# Patient Record
Sex: MALE | Race: BLACK OR AFRICAN AMERICAN | NOT HISPANIC OR LATINO | Employment: FULL TIME | URBAN - METROPOLITAN AREA
[De-identification: names, ages, dates, MRNs, and addresses within clinical notes are randomized per-mention and may not be internally consistent; named-entity substitution may affect disease eponyms.]

---

## 2017-01-16 RX ORDER — TRIAMTERENE AND HYDROCHLOROTHIAZIDE 37.5; 25 MG/1; MG/1
TABLET ORAL
Qty: 30 TABLET | Refills: 0 | Status: SHIPPED | OUTPATIENT
Start: 2017-01-16 | End: 2017-02-28 | Stop reason: SDUPTHER

## 2017-02-21 RX ORDER — TRIAMTERENE AND HYDROCHLOROTHIAZIDE 37.5; 25 MG/1; MG/1
TABLET ORAL
Qty: 30 TABLET | Refills: 0 | OUTPATIENT
Start: 2017-02-21

## 2017-02-28 RX ORDER — TRIAMTERENE AND HYDROCHLOROTHIAZIDE 37.5; 25 MG/1; MG/1
TABLET ORAL
Qty: 30 TABLET | Refills: 4 | Status: SHIPPED | OUTPATIENT
Start: 2017-02-28 | End: 2017-03-02 | Stop reason: SDUPTHER

## 2017-03-02 ENCOUNTER — OFFICE VISIT (OUTPATIENT)
Dept: INTERNAL MEDICINE | Facility: CLINIC | Age: 42
End: 2017-03-02

## 2017-03-02 VITALS
OXYGEN SATURATION: 98 % | BODY MASS INDEX: 27.16 KG/M2 | HEIGHT: 71 IN | SYSTOLIC BLOOD PRESSURE: 136 MMHG | HEART RATE: 69 BPM | DIASTOLIC BLOOD PRESSURE: 92 MMHG | WEIGHT: 194 LBS

## 2017-03-02 DIAGNOSIS — I10 BENIGN ESSENTIAL HTN: Primary | ICD-10-CM

## 2017-03-02 PROCEDURE — 99213 OFFICE O/P EST LOW 20 MIN: CPT | Performed by: INTERNAL MEDICINE

## 2017-03-02 RX ORDER — TRIAMTERENE AND HYDROCHLOROTHIAZIDE 37.5; 25 MG/1; MG/1
1 CAPSULE ORAL EVERY MORNING
Qty: 90 CAPSULE | Refills: 1 | Status: SHIPPED | OUTPATIENT
Start: 2017-03-02 | End: 2017-08-03 | Stop reason: SDUPTHER

## 2017-03-02 RX ORDER — AMLODIPINE BESYLATE 10 MG/1
10 TABLET ORAL DAILY
Qty: 90 TABLET | Refills: 1 | Status: SHIPPED | OUTPATIENT
Start: 2017-03-02 | End: 2017-08-03 | Stop reason: SDUPTHER

## 2017-03-02 NOTE — PROGRESS NOTES
Subjective   Sadi Reyes is a 41 y.o. male.     Hypertension   This is a chronic problem. The current episode started more than 1 year ago. Associated symptoms include chest pain. Pertinent negatives include no palpitations.        The following portions of the patient's history were reviewed and updated as appropriate: allergies, current medications, past family history, past medical history, past social history, past surgical history and problem list.    Review of Systems   Constitutional:        Generally doing well     HENT: Negative.    Eyes: Negative.    Respiratory: Negative.    Cardiovascular: Positive for chest pain. Negative for palpitations.        About once or twice a week will have stomach cramps for 5 minutes after taking Dyazide Has been out for several days & hasn't bothered him Usuallt takes RX on an empty stomach when he 1st gets up @ 0500 Advised to try taking after he eats something Does check BP & has been OK   Gastrointestinal: Negative.    Genitourinary: Negative.    Musculoskeletal: Negative.    Neurological: Negative.        Objective   Physical Exam   Constitutional: He is oriented to person, place, and time. He appears well-developed.   HENT:   Head: Atraumatic.   Eyes: EOM are normal.   Neck: Neck supple.   Cardiovascular: Normal rate, regular rhythm and normal heart sounds.    Repeat 134/90   Pulmonary/Chest: Effort normal and breath sounds normal.   Musculoskeletal: Normal range of motion.   Neurological: He is alert and oriented to person, place, and time.   Vitals reviewed.      Assessment/Plan   Sadi was seen today for hypertension and med refill.    Diagnoses and all orders for this visit:    Benign essential HTN  -     amLODIPine (NORVASC) 10 MG tablet; Take 1 tablet by mouth Daily.  -     triamterene-hydrochlorothiazide (DYAZIDE) 37.5-25 MG per capsule; Take 1 capsule by mouth Every Morning.

## 2017-05-22 ENCOUNTER — OFFICE VISIT (OUTPATIENT)
Dept: INTERNAL MEDICINE | Facility: CLINIC | Age: 42
End: 2017-05-22

## 2017-05-22 VITALS
WEIGHT: 190 LBS | DIASTOLIC BLOOD PRESSURE: 94 MMHG | SYSTOLIC BLOOD PRESSURE: 150 MMHG | BODY MASS INDEX: 26.6 KG/M2 | HEIGHT: 71 IN

## 2017-05-22 DIAGNOSIS — R36.1 HEMATOSPERMIA: Primary | ICD-10-CM

## 2017-05-22 LAB
BACTERIA UR QL AUTO: ABNORMAL /HPF
BILIRUB UR QL STRIP: NEGATIVE
CLARITY UR: CLEAR
COLOR UR: YELLOW
GLUCOSE UR STRIP-MCNC: NEGATIVE MG/DL
HGB UR QL STRIP.AUTO: ABNORMAL
HYALINE CASTS UR QL AUTO: ABNORMAL /LPF
KETONES UR QL STRIP: NEGATIVE
LEUKOCYTE ESTERASE UR QL STRIP.AUTO: NEGATIVE
NITRITE UR QL STRIP: NEGATIVE
PH UR STRIP.AUTO: 5.5 [PH] (ref 5–8)
PROT UR QL STRIP: NEGATIVE
RBC # UR: ABNORMAL /HPF
REF LAB TEST METHOD: ABNORMAL
SP GR UR STRIP: 1.01 (ref 1–1.03)
SQUAMOUS #/AREA URNS HPF: ABNORMAL /HPF
UROBILINOGEN UR QL STRIP: ABNORMAL
WBC UR QL AUTO: ABNORMAL /HPF

## 2017-05-22 PROCEDURE — 81001 URINALYSIS AUTO W/SCOPE: CPT | Performed by: INTERNAL MEDICINE

## 2017-05-22 PROCEDURE — 99213 OFFICE O/P EST LOW 20 MIN: CPT | Performed by: INTERNAL MEDICINE

## 2017-05-22 RX ORDER — SULFAMETHOXAZOLE AND TRIMETHOPRIM 800; 160 MG/1; MG/1
1 TABLET ORAL 2 TIMES DAILY
Qty: 14 TABLET | Refills: 0 | Status: SHIPPED | OUTPATIENT
Start: 2017-05-22 | End: 2017-08-03

## 2017-07-28 RX ORDER — TRIAMTERENE AND HYDROCHLOROTHIAZIDE 37.5; 25 MG/1; MG/1
TABLET ORAL
Qty: 30 TABLET | Refills: 4 | Status: SHIPPED | OUTPATIENT
Start: 2017-07-28 | End: 2017-08-03 | Stop reason: SDUPTHER

## 2017-08-03 ENCOUNTER — OFFICE VISIT (OUTPATIENT)
Dept: INTERNAL MEDICINE | Facility: CLINIC | Age: 42
End: 2017-08-03

## 2017-08-03 VITALS
OXYGEN SATURATION: 97 % | BODY MASS INDEX: 26.88 KG/M2 | WEIGHT: 192 LBS | SYSTOLIC BLOOD PRESSURE: 140 MMHG | DIASTOLIC BLOOD PRESSURE: 94 MMHG | HEIGHT: 71 IN | HEART RATE: 67 BPM

## 2017-08-03 DIAGNOSIS — R05.8 COUGH WITH EXPECTORATION: Primary | ICD-10-CM

## 2017-08-03 DIAGNOSIS — I10 BENIGN ESSENTIAL HTN: ICD-10-CM

## 2017-08-03 DIAGNOSIS — S39.012A LUMBAR STRAIN, INITIAL ENCOUNTER: ICD-10-CM

## 2017-08-03 LAB
ALBUMIN SERPL-MCNC: 4.44 G/DL (ref 3.4–4.6)
ALBUMIN/GLOB SERPL: 1.3 G/DL
ALP SERPL-CCNC: 51 U/L (ref 46–116)
ALT SERPL W P-5'-P-CCNC: 58 U/L (ref 16–63)
ANION GAP SERPL CALCULATED.3IONS-SCNC: 10 MMOL/L
AST SERPL-CCNC: 28 U/L (ref 7–37)
BASOPHILS # BLD AUTO: 0.03 10*3/MM3 (ref 0–0.2)
BASOPHILS NFR BLD AUTO: 0.5 % (ref 0–1.5)
BILIRUB SERPL-MCNC: 0.3 MG/DL (ref 0.2–1)
BUN BLD-MCNC: 15 MG/DL (ref 6–22)
BUN/CREAT SERPL: 13.6 (ref 7–25)
CALCIUM SPEC-SCNC: 9.6 MG/DL (ref 8.6–10.5)
CHLORIDE SERPL-SCNC: 102 MMOL/L (ref 95–107)
CHOLEST SERPL-MCNC: 295 MG/DL (ref 0–200)
CO2 SERPL-SCNC: 28 MMOL/L (ref 23–32)
CREAT BLD-MCNC: 1.1 MG/DL (ref 0.7–1.3)
EOSINOPHIL # BLD AUTO: 0.12 10*3/MM3 (ref 0–0.7)
EOSINOPHIL # BLD AUTO: 1.9 % (ref 0.3–6.2)
ERYTHROCYTE [DISTWIDTH] IN BLOOD BY AUTOMATED COUNT: 14 % (ref 11.5–14.5)
GFR SERPL CREATININE-BSD FRML MDRD: 90 ML/MIN/1.73
GLOBULIN UR ELPH-MCNC: 3.5 GM/DL
GLUCOSE BLD-MCNC: 92 MG/DL (ref 70–100)
HCT VFR BLD AUTO: 47.4 % (ref 40.4–52.2)
HDLC SERPL-MCNC: 42 MG/DL (ref 40–81)
HGB BLD-MCNC: 15.9 G/DL (ref 13.7–17.6)
IMM GRANULOCYTES # BLD: 0 10*3/MM3 (ref 0–0.03)
IMM GRANULOCYTES NFR BLD: 0 % (ref 0–0.5)
LDLC SERPL CALC-MCNC: 233 MG/DL (ref 0–100)
LDLC/HDLC SERPL: 5.54 {RATIO}
LYMPHOCYTES # BLD AUTO: 2.6 10*3/MM3 (ref 0.9–4.8)
LYMPHOCYTES NFR BLD AUTO: 41.3 % (ref 19.6–45.3)
MCH RBC QN AUTO: 31.7 PG (ref 27–32.7)
MCHC RBC AUTO-ENTMCNC: 33.5 G/DL (ref 32.6–36.4)
MCV RBC AUTO: 94.4 FL (ref 79.8–96.2)
MONOCYTES # BLD AUTO: 0.45 10*3/MM3 (ref 0.2–1.2)
MONOCYTES NFR BLD AUTO: 7.2 % (ref 5–12)
NEUTROPHILS # BLD AUTO: 3.09 10*3/MM3 (ref 1.9–8.1)
NEUTROPHILS NFR BLD AUTO: 49.1 % (ref 42.7–76)
PLATELET # BLD AUTO: 233 10*3/MM3 (ref 140–500)
POTASSIUM BLD-SCNC: 4.3 MMOL/L (ref 3.3–5.3)
PROT SERPL-MCNC: 7.9 G/DL (ref 6.3–8.4)
RBC # BLD AUTO: 5.02 10*6/MM3 (ref 4.6–6)
SODIUM BLD-SCNC: 140 MMOL/L (ref 136–145)
TRIGL SERPL-MCNC: 101 MG/DL (ref 0–150)
VLDLC SERPL-MCNC: 20.2 MG/DL
WBC # BLD AUTO: 6.29 10*3/MM3 (ref 4.5–10.7)

## 2017-08-03 PROCEDURE — 80053 COMPREHEN METABOLIC PANEL: CPT | Performed by: INTERNAL MEDICINE

## 2017-08-03 PROCEDURE — 99213 OFFICE O/P EST LOW 20 MIN: CPT | Performed by: INTERNAL MEDICINE

## 2017-08-03 PROCEDURE — 80061 LIPID PANEL: CPT | Performed by: INTERNAL MEDICINE

## 2017-08-03 RX ORDER — TRIAMTERENE AND HYDROCHLOROTHIAZIDE 37.5; 25 MG/1; MG/1
1 TABLET ORAL DAILY
Qty: 90 TABLET | Refills: 1 | Status: SHIPPED | OUTPATIENT
Start: 2017-08-03 | End: 2018-05-05 | Stop reason: SDUPTHER

## 2017-08-03 RX ORDER — AMLODIPINE BESYLATE 10 MG/1
10 TABLET ORAL DAILY
Qty: 90 TABLET | Refills: 1 | Status: SHIPPED | OUTPATIENT
Start: 2017-08-03 | End: 2018-03-10 | Stop reason: SDUPTHER

## 2017-08-03 NOTE — PROGRESS NOTES
Subjective   Sadi Reyes is a 41 y.o. male.     Hypertension   This is a chronic problem. The current episode started more than 1 year ago. Pertinent negatives include no chest pain, palpitations or shortness of breath.        The following portions of the patient's history were reviewed and updated as appropriate: allergies, current medications, past medical history, past social history, past surgical history and problem list.    Review of Systems   Constitutional:        Generally doing well Has a couple new problems   HENT: Negative.    Eyes: Negative.    Respiratory: Positive for cough (Has been coughing up more mucous yellow / brown Worse in AM Nonsmoker). Negative for shortness of breath and wheezing.    Cardiovascular: Negative for chest pain and palpitations.        BP checks have been OK   Gastrointestinal: Negative.    Genitourinary: Negative.  Negative for hematuria.   Musculoskeletal: Positive for back pain ( Fell & hurt low back in January Pain seems to be getting wors Hurts when he sneezes or ties shoes).   Neurological: Negative.    Psychiatric/Behavioral: Negative.        Objective   Physical Exam   Constitutional: He is oriented to person, place, and time. He appears well-developed.   HENT:   Head: Normocephalic.   Eyes: EOM are normal.   Neck: Neck supple.   Cardiovascular: Normal rate, regular rhythm and normal heart sounds.    Repeat 134/90   Pulmonary/Chest: Effort normal and breath sounds normal.   Musculoskeletal:   No tenderness over lumbar spine or paravertebral muscles   Neurological: He is alert and oriented to person, place, and time.   Skin: Skin is warm and dry.   Vitals reviewed.      Assessment/Plan   Sadi was seen today for hypertension and follow-up.    Diagnoses and all orders for this visit:    Cough with expectoration  -     XR Chest 2 View    Benign essential HTN  -     amLODIPine (NORVASC) 10 MG tablet; Take 1 tablet by mouth Daily.  -     triamterene-hydrochlorothiazide  (MAXZIDE-25) 37.5-25 MG per tablet; Take 1 tablet by mouth Daily.  -     CBC Auto Differential; Future  -     Comprehensive Metabolic Panel; Future  -     Lipid Panel; Future    Lumbar strain, initial encounter  -     XR Spine Lumbar 4+ View    Given back stretching exercises

## 2017-08-21 ENCOUNTER — HOSPITAL ENCOUNTER (OUTPATIENT)
Dept: GENERAL RADIOLOGY | Facility: HOSPITAL | Age: 42
Discharge: HOME | End: 2017-08-21
Attending: INTERNAL MEDICINE

## 2017-08-21 ENCOUNTER — HOSPITAL ENCOUNTER (OUTPATIENT)
Dept: GENERAL RADIOLOGY | Facility: HOSPITAL | Age: 42
Discharge: HOME | End: 2017-08-21
Attending: INTERNAL MEDICINE | Admitting: INTERNAL MEDICINE

## 2017-08-21 PROCEDURE — 71020 HC CHEST PA AND LATERAL: CPT

## 2017-08-21 PROCEDURE — 72110 X-RAY EXAM L-2 SPINE 4/>VWS: CPT

## 2017-08-31 ENCOUNTER — TELEPHONE (OUTPATIENT)
Dept: INTERNAL MEDICINE | Facility: CLINIC | Age: 42
End: 2017-08-31

## 2017-11-17 ENCOUNTER — OFFICE VISIT (OUTPATIENT)
Dept: INTERNAL MEDICINE | Facility: CLINIC | Age: 42
End: 2017-11-17

## 2017-11-17 VITALS
HEART RATE: 64 BPM | SYSTOLIC BLOOD PRESSURE: 122 MMHG | OXYGEN SATURATION: 98 % | WEIGHT: 196 LBS | HEIGHT: 71 IN | BODY MASS INDEX: 27.44 KG/M2 | DIASTOLIC BLOOD PRESSURE: 84 MMHG

## 2017-11-17 DIAGNOSIS — E78.2 MIXED HYPERLIPIDEMIA: ICD-10-CM

## 2017-11-17 DIAGNOSIS — Z98.890 HX OF INGUINAL HERNIA REPAIR: ICD-10-CM

## 2017-11-17 DIAGNOSIS — I10 ESSENTIAL HYPERTENSION: Primary | ICD-10-CM

## 2017-11-17 DIAGNOSIS — Z87.19 HX OF INGUINAL HERNIA REPAIR: ICD-10-CM

## 2017-11-17 LAB
ALBUMIN SERPL-MCNC: 4.9 G/DL (ref 3.5–5.2)
ALBUMIN/GLOB SERPL: 1.9 G/DL
ALP SERPL-CCNC: 50 U/L (ref 39–117)
ALT SERPL-CCNC: 27 U/L (ref 1–41)
AST SERPL-CCNC: 19 U/L (ref 1–40)
BASOPHILS # BLD AUTO: 0.02 10*3/MM3 (ref 0–0.2)
BASOPHILS NFR BLD AUTO: 0.3 % (ref 0–2)
BILIRUB SERPL-MCNC: 0.3 MG/DL (ref 0.1–1.2)
BUN SERPL-MCNC: 19 MG/DL (ref 6–20)
BUN/CREAT SERPL: 11.7 (ref 7–25)
CALCIUM SERPL-MCNC: 10 MG/DL (ref 8.6–10.5)
CHLORIDE SERPL-SCNC: 101 MMOL/L (ref 98–107)
CO2 SERPL-SCNC: 31.1 MMOL/L (ref 22–29)
CREAT SERPL-MCNC: 1.63 MG/DL (ref 0.76–1.27)
DEPRECATED RDW RBC AUTO: 42.5 FL (ref 37–54)
EOSINOPHIL # BLD AUTO: 0.08 10*3/MM3 (ref 0–0.7)
EOSINOPHIL NFR BLD AUTO: 1.2 % (ref 0–5)
ERYTHROCYTE [DISTWIDTH] IN BLOOD BY AUTOMATED COUNT: 12.9 % (ref 11.5–15)
GLOBULIN SER CALC-MCNC: 2.6 GM/DL
GLUCOSE SERPL-MCNC: 90 MG/DL (ref 65–99)
HCT VFR BLD AUTO: 42.1 % (ref 40.1–51)
HGB BLD-MCNC: 14.3 G/DL (ref 13.7–17.5)
LYMPHOCYTES # BLD AUTO: 2.48 10*3/MM3 (ref 0.8–7)
LYMPHOCYTES NFR BLD AUTO: 35.8 % (ref 10–60)
MCH RBC QN AUTO: 31.3 PG (ref 26–34)
MCHC RBC AUTO-ENTMCNC: 34 G/DL (ref 31–37)
MCV RBC AUTO: 92.1 FL (ref 80–100)
MONOCYTES # BLD AUTO: 0.49 10*3/MM3 (ref 0–1)
MONOCYTES NFR BLD AUTO: 7.1 % (ref 0–13)
NEUTROPHILS # BLD AUTO: 3.85 10*3/MM3 (ref 1–11)
NEUTROPHILS NFR BLD AUTO: 55.6 % (ref 30–85)
PLATELET # BLD AUTO: 243 10*3/MM3 (ref 150–450)
PMV BLD AUTO: 11.1 FL (ref 6–12)
POTASSIUM SERPL-SCNC: 3.9 MMOL/L (ref 3.5–5.2)
PROT SERPL-MCNC: 7.5 G/DL (ref 6–8.5)
RBC # BLD AUTO: 4.57 10*6/MM3 (ref 4.63–6.08)
SODIUM SERPL-SCNC: 143 MMOL/L (ref 136–145)
WBC NRBC COR # BLD: 6.92 10*3/MM3 (ref 5–10)

## 2017-11-17 PROCEDURE — 99214 OFFICE O/P EST MOD 30 MIN: CPT | Performed by: INTERNAL MEDICINE

## 2017-11-17 PROCEDURE — 85025 COMPLETE CBC W/AUTO DIFF WBC: CPT | Performed by: INTERNAL MEDICINE

## 2017-11-17 RX ORDER — ATORVASTATIN CALCIUM 20 MG/1
20 TABLET, FILM COATED ORAL DAILY
Qty: 30 TABLET | Refills: 3 | Status: SHIPPED | OUTPATIENT
Start: 2017-11-17 | End: 2018-03-10 | Stop reason: SDUPTHER

## 2017-11-17 RX ORDER — ATORVASTATIN CALCIUM 20 MG/1
20 TABLET, FILM COATED ORAL DAILY
Qty: 30 TABLET | Refills: 3 | Status: SHIPPED | OUTPATIENT
Start: 2017-11-17 | End: 2017-11-17 | Stop reason: SDUPTHER

## 2017-11-17 NOTE — PROGRESS NOTES
Subjective   Sadi Reyes is a 42 y.o. male.     Hernia   Associated symptoms include abdominal pain (Having pain in Rt inguinal area Had hernia repair there about 2 years ago).   Hypertension          The following portions of the patient's history were reviewed and updated as appropriate: allergies, current medications, past family history, past medical history, past social history, past surgical history and problem list.    Review of Systems   Gastrointestinal: Positive for abdominal pain (Having pain in Rt inguinal area Had hernia repair there about 2 years ago).   Musculoskeletal: Positive for back pain (Back pain comes & goes but overall doing better).       Objective   Physical Exam   Constitutional: He is oriented to person, place, and time. He appears well-developed.   HENT:   Head: Normocephalic.   Eyes: EOM are normal.   Neck: Neck supple.   Cardiovascular: Normal rate, regular rhythm and normal heart sounds.    Repeat 140/90   Pulmonary/Chest: Effort normal and breath sounds normal.   Abdominal:   No evidence of hernia   Musculoskeletal: Normal range of motion.   Neurological: He is alert and oriented to person, place, and time.   Vitals reviewed.      Assessment/Plan   Sadi was seen today for hernia and hypertension.    Diagnoses and all orders for this visit:    Essential hypertension  -     CBC Auto Differential; Future  -     Comprehensive Metabolic Panel; Future  -     CBC Auto Differential  -     Comprehensive Metabolic Panel    Mixed hyperlipidemia  -     Discontinue: atorvastatin (LIPITOR) 20 MG tablet; Take 1 tablet by mouth Daily.  -     atorvastatin (LIPITOR) 20 MG tablet; Take 1 tablet by mouth Daily.

## 2017-11-28 ENCOUNTER — TELEPHONE (OUTPATIENT)
Dept: INTERNAL MEDICINE | Facility: CLINIC | Age: 42
End: 2017-11-28

## 2017-11-28 NOTE — TELEPHONE ENCOUNTER
Spoke with patient.  Advised to deep see the diclofenac and start omeprazole 40 mg daily increase fluid intake for the elevated serum creatinine.  This may be related to the diclofenac therapy as well.

## 2017-11-28 NOTE — TELEPHONE ENCOUNTER
"----- Message from Nasreen Diana sent at 11/28/2017  9:05 AM EST -----  Contact: Patient   Patient called complaining of chest and stomach pains x2 days, which he thinks is a side effect of diclofenac sodium 75 mg, prescribed by his foot doctor, which he takes once a day for \"foot inflammation.\"      Also he is inquiring about last lab results.  Please advise.      Patient:  [phone]    [facility]/pharmacy #[id number] - [place] [address] AT [facility] - [phone]  - [phone] FX  "

## 2018-03-10 DIAGNOSIS — E78.2 MIXED HYPERLIPIDEMIA: ICD-10-CM

## 2018-03-10 DIAGNOSIS — I10 BENIGN ESSENTIAL HTN: ICD-10-CM

## 2018-03-12 RX ORDER — ATORVASTATIN CALCIUM 20 MG/1
20 TABLET, FILM COATED ORAL DAILY
Qty: 30 TABLET | Refills: 3 | Status: SHIPPED | OUTPATIENT
Start: 2018-03-12 | End: 2018-04-16 | Stop reason: SDUPTHER

## 2018-03-12 RX ORDER — AMLODIPINE BESYLATE 10 MG/1
TABLET ORAL
Qty: 90 TABLET | Refills: 1 | Status: SHIPPED | OUTPATIENT
Start: 2018-03-12 | End: 2018-09-29 | Stop reason: SDUPTHER

## 2018-03-30 ENCOUNTER — OFFICE VISIT (OUTPATIENT)
Dept: INTERNAL MEDICINE | Facility: CLINIC | Age: 43
End: 2018-03-30

## 2018-03-30 VITALS
HEART RATE: 61 BPM | HEIGHT: 71 IN | WEIGHT: 189 LBS | SYSTOLIC BLOOD PRESSURE: 122 MMHG | OXYGEN SATURATION: 99 % | DIASTOLIC BLOOD PRESSURE: 100 MMHG | BODY MASS INDEX: 26.46 KG/M2

## 2018-03-30 DIAGNOSIS — I10 ESSENTIAL HYPERTENSION: Primary | ICD-10-CM

## 2018-03-30 DIAGNOSIS — E78.2 MIXED HYPERLIPIDEMIA: ICD-10-CM

## 2018-03-30 LAB
ALBUMIN SERPL-MCNC: 4.7 G/DL (ref 3.5–5.2)
ALBUMIN/GLOB SERPL: 1.7 G/DL
ALP SERPL-CCNC: 53 U/L (ref 39–117)
ALT SERPL-CCNC: 37 U/L (ref 1–41)
AST SERPL-CCNC: 25 U/L (ref 1–40)
BASOPHILS # BLD AUTO: 0.03 10*3/MM3 (ref 0–0.2)
BASOPHILS NFR BLD AUTO: 0.5 % (ref 0–1.5)
BILIRUB SERPL-MCNC: 0.4 MG/DL (ref 0.1–1.2)
BUN SERPL-MCNC: 14 MG/DL (ref 6–20)
BUN/CREAT SERPL: 13.1 (ref 7–25)
CALCIUM SERPL-MCNC: 9.4 MG/DL (ref 8.6–10.5)
CHLORIDE SERPL-SCNC: 102 MMOL/L (ref 98–107)
CHOLEST SERPL-MCNC: 170 MG/DL (ref 0–200)
CO2 SERPL-SCNC: 25.3 MMOL/L (ref 22–29)
CREAT SERPL-MCNC: 1.07 MG/DL (ref 0.76–1.27)
EOSINOPHIL # BLD AUTO: 0.11 10*3/MM3 (ref 0–0.7)
EOSINOPHIL # BLD AUTO: 1.8 % (ref 0.3–6.2)
ERYTHROCYTE [DISTWIDTH] IN BLOOD BY AUTOMATED COUNT: 13.2 % (ref 11.5–14.5)
GLOBULIN SER CALC-MCNC: 2.8 GM/DL
GLUCOSE SERPL-MCNC: 93 MG/DL (ref 65–99)
HCT VFR BLD AUTO: 45.2 % (ref 40.4–52.2)
HDLC SERPL-MCNC: 41 MG/DL (ref 40–60)
HGB BLD-MCNC: 15.3 G/DL (ref 13.7–17.6)
IMM GRANULOCYTES # BLD: 0 10*3/MM3 (ref 0–0.03)
IMM GRANULOCYTES NFR BLD: 0 % (ref 0–0.5)
LDLC SERPL CALC-MCNC: 116 MG/DL (ref 0–100)
LYMPHOCYTES # BLD AUTO: 2.87 10*3/MM3 (ref 0.9–4.8)
LYMPHOCYTES NFR BLD AUTO: 46.5 % (ref 19.6–45.3)
MCH RBC QN AUTO: 31.7 PG (ref 27–32.7)
MCHC RBC AUTO-ENTMCNC: 33.8 G/DL (ref 32.6–36.4)
MCV RBC AUTO: 93.8 FL (ref 79.8–96.2)
MONOCYTES # BLD AUTO: 0.54 10*3/MM3 (ref 0.2–1.2)
MONOCYTES NFR BLD AUTO: 8.8 % (ref 5–12)
NEUTROPHILS # BLD AUTO: 2.62 10*3/MM3 (ref 1.9–8.1)
NEUTROPHILS NFR BLD AUTO: 42.4 % (ref 42.7–76)
PLATELET # BLD AUTO: 250 10*3/MM3 (ref 140–500)
POTASSIUM SERPL-SCNC: 3.9 MMOL/L (ref 3.5–5.2)
PROT SERPL-MCNC: 7.5 G/DL (ref 6–8.5)
RBC # BLD AUTO: 4.82 10*6/MM3 (ref 4.6–6)
SODIUM SERPL-SCNC: 140 MMOL/L (ref 136–145)
TRIGL SERPL-MCNC: 64 MG/DL (ref 0–150)
VLDLC SERPL-MCNC: 12.8 MG/DL (ref 5–40)
WBC # BLD AUTO: 6.17 10*3/MM3 (ref 4.5–10.7)

## 2018-03-30 PROCEDURE — 99213 OFFICE O/P EST LOW 20 MIN: CPT | Performed by: INTERNAL MEDICINE

## 2018-03-30 NOTE — PROGRESS NOTES
Subjective   Sadi Reyes is a 42 y.o. male.     Hypertension   This is a chronic problem. The current episode started more than 1 year ago. Pertinent negatives include no chest pain or palpitations.        The following portions of the patient's history were reviewed and updated as appropriate: allergies, current medications, past family history, past medical history, past social history, past surgical history and problem list.    Review of Systems   Constitutional:        Generally dong well   HENT: Negative.    Eyes: Negative.    Respiratory: Negative.    Cardiovascular: Negative for chest pain and palpitations.        BP checks @ home have been good   Gastrointestinal: Negative.    Genitourinary: Negative.    Musculoskeletal: Positive for back pain (Has back issues which is stable).   Neurological: Negative.        Objective   Physical Exam   Constitutional: He is oriented to person, place, and time. He appears well-developed.   HENT:   Head: Normocephalic.   Eyes: EOM are normal.   Neck: Neck supple.   Cardiovascular: Normal rate, regular rhythm and normal heart sounds.    RepeaT 130/90   Pulmonary/Chest: Effort normal and breath sounds normal.   Musculoskeletal: Normal range of motion.   Neurological: He is alert and oriented to person, place, and time.   Vitals reviewed.        Assessment/Plan   Sadi was seen today for hyperlipidemia and hypertension.    Diagnoses and all orders for this visit:    Essential hypertension  -     CBC Auto Differential; Future  -     Comprehensive Metabolic Panel; Future    Mixed hyperlipidemia  -     Lipid Panel; Future

## 2018-04-16 DIAGNOSIS — E78.2 MIXED HYPERLIPIDEMIA: ICD-10-CM

## 2018-04-16 RX ORDER — ATORVASTATIN CALCIUM 20 MG/1
20 TABLET, FILM COATED ORAL DAILY
Qty: 90 TABLET | Refills: 3 | Status: SHIPPED | OUTPATIENT
Start: 2018-04-16 | End: 2019-08-01 | Stop reason: SDUPTHER

## 2018-05-05 DIAGNOSIS — I10 BENIGN ESSENTIAL HTN: ICD-10-CM

## 2018-05-07 RX ORDER — TRIAMTERENE AND HYDROCHLOROTHIAZIDE 37.5; 25 MG/1; MG/1
1 TABLET ORAL DAILY
Qty: 90 TABLET | Refills: 1 | Status: SHIPPED | OUTPATIENT
Start: 2018-05-07 | End: 2018-09-24 | Stop reason: SDUPTHER

## 2018-07-02 ENCOUNTER — OFFICE VISIT (OUTPATIENT)
Dept: INTERNAL MEDICINE | Facility: CLINIC | Age: 43
End: 2018-07-02

## 2018-07-02 VITALS
BODY MASS INDEX: 24.78 KG/M2 | HEIGHT: 71 IN | DIASTOLIC BLOOD PRESSURE: 86 MMHG | SYSTOLIC BLOOD PRESSURE: 120 MMHG | OXYGEN SATURATION: 99 % | WEIGHT: 177 LBS | HEART RATE: 67 BPM

## 2018-07-02 DIAGNOSIS — I10 ESSENTIAL HYPERTENSION: Primary | ICD-10-CM

## 2018-07-02 DIAGNOSIS — R63.4 WEIGHT LOSS: ICD-10-CM

## 2018-07-02 DIAGNOSIS — S39.012D STRAIN OF LUMBAR REGION, SUBSEQUENT ENCOUNTER: ICD-10-CM

## 2018-07-02 LAB
ALBUMIN SERPL-MCNC: 4.9 G/DL (ref 3.5–5.2)
ALBUMIN/GLOB SERPL: 1.6 G/DL
ALP SERPL-CCNC: 50 U/L (ref 39–117)
ALT SERPL W P-5'-P-CCNC: 35 U/L (ref 1–41)
ANION GAP SERPL CALCULATED.3IONS-SCNC: 12.5 MMOL/L
AST SERPL-CCNC: 21 U/L (ref 1–40)
BASOPHILS # BLD AUTO: 0.03 10*3/MM3 (ref 0–0.2)
BASOPHILS NFR BLD AUTO: 0.5 % (ref 0–2)
BILIRUB SERPL-MCNC: 0.6 MG/DL (ref 0.1–1.2)
BUN BLD-MCNC: 12 MG/DL (ref 6–20)
BUN/CREAT SERPL: 11.1 (ref 7–25)
CALCIUM SPEC-SCNC: 9.7 MG/DL (ref 8.6–10.5)
CHLORIDE SERPL-SCNC: 100 MMOL/L (ref 98–107)
CO2 SERPL-SCNC: 26.5 MMOL/L (ref 22–29)
CREAT BLD-MCNC: 1.08 MG/DL (ref 0.76–1.27)
DEPRECATED RDW RBC AUTO: 45.5 FL (ref 37–54)
EOSINOPHIL # BLD AUTO: 0.09 10*3/MM3 (ref 0–0.7)
EOSINOPHIL NFR BLD AUTO: 1.4 % (ref 0–5)
ERYTHROCYTE [DISTWIDTH] IN BLOOD BY AUTOMATED COUNT: 13.6 % (ref 11.5–15)
GFR SERPL CREATININE-BSD FRML MDRD: 92 ML/MIN/1.73
GLOBULIN UR ELPH-MCNC: 3.1 GM/DL
GLUCOSE BLD-MCNC: 88 MG/DL (ref 65–99)
HCT VFR BLD AUTO: 46.8 % (ref 40.1–51)
HGB BLD-MCNC: 15.7 G/DL (ref 13.7–17.5)
LYMPHOCYTES # BLD AUTO: 2.16 10*3/MM3 (ref 0.8–7)
LYMPHOCYTES NFR BLD AUTO: 34.3 % (ref 10–60)
MCH RBC QN AUTO: 31.5 PG (ref 26–34)
MCHC RBC AUTO-ENTMCNC: 33.5 G/DL (ref 31–37)
MCV RBC AUTO: 93.8 FL (ref 80–100)
MONOCYTES # BLD AUTO: 0.49 10*3/MM3 (ref 0–1)
MONOCYTES NFR BLD AUTO: 7.8 % (ref 0–13)
NEUTROPHILS # BLD AUTO: 3.53 10*3/MM3 (ref 1–11)
NEUTROPHILS NFR BLD AUTO: 56 % (ref 30–85)
PLATELET # BLD AUTO: 249 10*3/MM3 (ref 150–450)
PMV BLD AUTO: 10.6 FL (ref 6–12)
POTASSIUM BLD-SCNC: 4.1 MMOL/L (ref 3.5–5.2)
PROT SERPL-MCNC: 8 G/DL (ref 6–8.5)
RBC # BLD AUTO: 4.99 10*6/MM3 (ref 4.63–6.08)
SODIUM BLD-SCNC: 139 MMOL/L (ref 136–145)
T3FREE SERPL-MCNC: 3.46 PG/ML (ref 2–4.4)
T4 FREE SERPL-MCNC: 1.01 NG/DL (ref 0.93–1.7)
TSH SERPL DL<=0.05 MIU/L-ACNC: 0.77 MIU/ML (ref 0.27–4.2)
WBC NRBC COR # BLD: 6.3 10*3/MM3 (ref 5–10)

## 2018-07-02 PROCEDURE — 84443 ASSAY THYROID STIM HORMONE: CPT | Performed by: INTERNAL MEDICINE

## 2018-07-02 PROCEDURE — 80053 COMPREHEN METABOLIC PANEL: CPT | Performed by: INTERNAL MEDICINE

## 2018-07-02 PROCEDURE — 84481 FREE ASSAY (FT-3): CPT | Performed by: INTERNAL MEDICINE

## 2018-07-02 PROCEDURE — 84439 ASSAY OF FREE THYROXINE: CPT | Performed by: INTERNAL MEDICINE

## 2018-07-02 PROCEDURE — 36415 COLL VENOUS BLD VENIPUNCTURE: CPT | Performed by: INTERNAL MEDICINE

## 2018-07-02 PROCEDURE — 99213 OFFICE O/P EST LOW 20 MIN: CPT | Performed by: INTERNAL MEDICINE

## 2018-07-02 PROCEDURE — 85025 COMPLETE CBC W/AUTO DIFF WBC: CPT | Performed by: INTERNAL MEDICINE

## 2018-07-02 RX ORDER — MELOXICAM 15 MG/1
15 TABLET ORAL DAILY
Qty: 30 TABLET | Refills: 3 | Status: SHIPPED | OUTPATIENT
Start: 2018-07-02 | End: 2018-08-29 | Stop reason: SDUPTHER

## 2018-07-02 NOTE — PROGRESS NOTES
Subjective   Sadi Reyes is a 42 y.o. male.     Hypertension   This is a chronic problem. The current episode started more than 1 year ago.        The following portions of the patient's history were reviewed and updated as appropriate: allergies, current medications, past family history, past medical history, past social history, past surgical history and problem list.    Review of Systems   Constitutional: Positive for unexpected weight change (Has been losing weight No appetite  ).        Has been having issues   HENT: Negative.    Respiratory: Negative.    Cardiovascular: Negative.    Gastrointestinal:        No GI complaints other than no appetite   Genitourinary: Negative.    Musculoskeletal: Positive for back pain (Has been acting up Taking advil 2BID).   Neurological: Negative.        Objective   Physical Exam   Constitutional: He appears well-developed.   HENT:   Head: Normocephalic.   Eyes: EOM are normal.   Neck: Neck supple.   Cardiovascular: Normal rate, regular rhythm and normal heart sounds.    Repeat 150/90   Pulmonary/Chest: Effort normal and breath sounds normal.   Neurological:   DTRs 3+ bilat SLR Neg        Assessment/Plan   Sadi was seen today for back pain, hypertension and hyperlipidemia.    Diagnoses and all orders for this visit:    Essential hypertension    Strain of lumbar region, subsequent encounter

## 2018-08-29 DIAGNOSIS — S39.012D STRAIN OF LUMBAR REGION, SUBSEQUENT ENCOUNTER: ICD-10-CM

## 2018-08-29 RX ORDER — MELOXICAM 15 MG/1
15 TABLET ORAL DAILY
Qty: 30 TABLET | Refills: 3 | Status: SHIPPED | OUTPATIENT
Start: 2018-08-29 | End: 2019-08-01

## 2018-09-24 DIAGNOSIS — I10 BENIGN ESSENTIAL HTN: ICD-10-CM

## 2018-09-24 RX ORDER — TRIAMTERENE AND HYDROCHLOROTHIAZIDE 37.5; 25 MG/1; MG/1
1 TABLET ORAL DAILY
Qty: 90 TABLET | Refills: 0 | Status: SHIPPED | OUTPATIENT
Start: 2018-09-24 | End: 2018-12-28 | Stop reason: SDUPTHER

## 2018-09-29 DIAGNOSIS — I10 BENIGN ESSENTIAL HTN: ICD-10-CM

## 2018-10-01 RX ORDER — AMLODIPINE BESYLATE 10 MG/1
TABLET ORAL
Qty: 90 TABLET | Refills: 1 | Status: SHIPPED | OUTPATIENT
Start: 2018-10-01 | End: 2019-04-11 | Stop reason: SDUPTHER

## 2018-12-28 DIAGNOSIS — I10 BENIGN ESSENTIAL HTN: ICD-10-CM

## 2018-12-31 RX ORDER — TRIAMTERENE AND HYDROCHLOROTHIAZIDE 37.5; 25 MG/1; MG/1
1 TABLET ORAL DAILY
Qty: 90 TABLET | Refills: 0 | Status: SHIPPED | OUTPATIENT
Start: 2018-12-31 | End: 2019-01-17 | Stop reason: SDUPTHER

## 2019-01-17 DIAGNOSIS — I10 BENIGN ESSENTIAL HTN: ICD-10-CM

## 2019-01-18 RX ORDER — TRIAMTERENE AND HYDROCHLOROTHIAZIDE 37.5; 25 MG/1; MG/1
TABLET ORAL
Qty: 90 TABLET | Refills: 0 | Status: SHIPPED | OUTPATIENT
Start: 2019-01-18 | End: 2019-08-01 | Stop reason: SDUPTHER

## 2019-04-11 DIAGNOSIS — I10 BENIGN ESSENTIAL HTN: ICD-10-CM

## 2019-04-11 RX ORDER — AMLODIPINE BESYLATE 10 MG/1
10 TABLET ORAL DAILY
Qty: 90 TABLET | Refills: 1 | Status: SHIPPED | OUTPATIENT
Start: 2019-04-11 | End: 2019-08-01

## 2019-07-11 DIAGNOSIS — I10 BENIGN ESSENTIAL HTN: ICD-10-CM

## 2019-08-01 ENCOUNTER — OFFICE VISIT (OUTPATIENT)
Dept: INTERNAL MEDICINE | Facility: CLINIC | Age: 44
End: 2019-08-01

## 2019-08-01 VITALS
OXYGEN SATURATION: 100 % | WEIGHT: 179.6 LBS | BODY MASS INDEX: 25.15 KG/M2 | DIASTOLIC BLOOD PRESSURE: 82 MMHG | HEART RATE: 62 BPM | HEIGHT: 71 IN | SYSTOLIC BLOOD PRESSURE: 134 MMHG

## 2019-08-01 DIAGNOSIS — I10 ESSENTIAL HYPERTENSION: Primary | ICD-10-CM

## 2019-08-01 DIAGNOSIS — E78.2 MIXED HYPERLIPIDEMIA: ICD-10-CM

## 2019-08-01 LAB
ALBUMIN SERPL-MCNC: 4.8 G/DL (ref 3.5–5.2)
ALBUMIN/GLOB SERPL: 1.7 G/DL
ALP SERPL-CCNC: 52 U/L (ref 39–117)
ALT SERPL-CCNC: 22 U/L (ref 1–41)
AST SERPL-CCNC: 19 U/L (ref 1–40)
BILIRUB SERPL-MCNC: 0.5 MG/DL (ref 0.2–1.2)
BUN SERPL-MCNC: 9 MG/DL (ref 6–20)
BUN/CREAT SERPL: 7.8 (ref 7–25)
CALCIUM SERPL-MCNC: 9.5 MG/DL (ref 8.6–10.5)
CHLORIDE SERPL-SCNC: 101 MMOL/L (ref 98–107)
CO2 SERPL-SCNC: 27.9 MMOL/L (ref 22–29)
CREAT SERPL-MCNC: 1.16 MG/DL (ref 0.76–1.27)
DEPRECATED RDW RBC AUTO: 44 FL (ref 37–54)
ERYTHROCYTE [DISTWIDTH] IN BLOOD BY AUTOMATED COUNT: 13.3 % (ref 12.3–15.4)
GLOBULIN SER CALC-MCNC: 2.8 GM/DL
GLUCOSE SERPL-MCNC: 90 MG/DL (ref 65–99)
HCT VFR BLD AUTO: 46.6 % (ref 37.5–51)
HGB BLD-MCNC: 16 G/DL (ref 13–17.7)
MCH RBC QN AUTO: 31.5 PG (ref 26.6–33)
MCHC RBC AUTO-ENTMCNC: 34.3 G/DL (ref 31.5–35.7)
MCV RBC AUTO: 91.7 FL (ref 79–97)
PLATELET # BLD AUTO: 230 10*3/MM3 (ref 140–450)
PMV BLD AUTO: 10.5 FL (ref 6–12)
POTASSIUM SERPL-SCNC: 4.2 MMOL/L (ref 3.5–5.2)
PROT SERPL-MCNC: 7.6 G/DL (ref 6–8.5)
RBC # BLD AUTO: 5.08 10*6/MM3 (ref 4.14–5.8)
SODIUM SERPL-SCNC: 141 MMOL/L (ref 136–145)
WBC NRBC COR # BLD: 6.26 10*3/MM3 (ref 3.4–10.8)

## 2019-08-01 PROCEDURE — 99214 OFFICE O/P EST MOD 30 MIN: CPT | Performed by: NURSE PRACTITIONER

## 2019-08-01 PROCEDURE — 85027 COMPLETE CBC AUTOMATED: CPT | Performed by: NURSE PRACTITIONER

## 2019-08-01 RX ORDER — TRIAMTERENE AND HYDROCHLOROTHIAZIDE 37.5; 25 MG/1; MG/1
1 TABLET ORAL DAILY
Qty: 90 TABLET | Refills: 1 | Status: SHIPPED | OUTPATIENT
Start: 2019-08-01 | End: 2020-01-27

## 2019-08-01 RX ORDER — ATORVASTATIN CALCIUM 20 MG/1
20 TABLET, FILM COATED ORAL DAILY
Qty: 90 TABLET | Refills: 1 | Status: SHIPPED | OUTPATIENT
Start: 2019-08-01 | End: 2020-01-27

## 2019-08-01 RX ORDER — AMLODIPINE BESYLATE 5 MG/1
5 TABLET ORAL DAILY
Qty: 30 TABLET | Refills: 1 | Status: SHIPPED | OUTPATIENT
Start: 2019-08-01 | End: 2019-08-29 | Stop reason: SDUPTHER

## 2019-08-01 NOTE — PROGRESS NOTES
Yue Reyes is a 43 y.o. male.     He has been working on low salt diet. He is monitoring blood pressure three times a week 120-130/80's.       Hypertension   This is a chronic problem. The current episode started more than 1 year ago. The problem is controlled. Pertinent negatives include no anxiety, blurred vision, chest pain, headaches, orthopnea, palpitations, peripheral edema, PND or shortness of breath. Current antihypertension treatment includes calcium channel blockers and diuretics. The current treatment provides significant improvement.   Hyperlipidemia   This is a chronic problem. The current episode started more than 1 year ago. The problem is controlled. Recent lipid tests were reviewed and are normal. Associated symptoms include myalgias (intermittent in legs, he will monitor ). Pertinent negatives include no chest pain or shortness of breath. Current antihyperlipidemic treatment includes statins. The current treatment provides significant improvement of lipids.        The following portions of the patient's history were reviewed and updated as appropriate: allergies, current medications, past family history, past medical history, past social history, past surgical history and problem list.    Review of Systems   Constitutional: Negative for activity change, appetite change, fatigue and fever.        Overall doing well    Eyes: Negative for blurred vision and visual disturbance.   Respiratory: Negative for cough, shortness of breath and wheezing.    Cardiovascular: Negative for chest pain, palpitations, orthopnea, leg swelling and PND.   Musculoskeletal: Positive for myalgias (intermittent in legs, he will monitor ).   Neurological: Negative for dizziness and headaches.       Objective   Physical Exam   Constitutional: He is oriented to person, place, and time. He appears well-developed and well-nourished.   HENT:   Head: Normocephalic.   Nose: Nose normal.   Neck: Carotid bruit is not  present. No thyroid mass and no thyromegaly present.   Cardiovascular: Regular rhythm and normal heart sounds. Exam reveals no S3 and no S4.   No murmur heard.  Repeat bp left arm 128/85  No pedal edema    Pulmonary/Chest: Effort normal and breath sounds normal. He has no decreased breath sounds. He has no wheezes. He has no rhonchi. He has no rales.   Musculoskeletal: He exhibits no edema.   Neurological: He is alert and oriented to person, place, and time. Gait normal.   Skin: Skin is warm and dry.   Psychiatric: He has a normal mood and affect.       Assessment/Plan   Sadi was seen today for hypertension and hyperlipidemia.    Diagnoses and all orders for this visit:    Essential hypertension  -     amLODIPine (NORVASC) 5 MG tablet; Take 1 tablet by mouth Daily.  -     triamterene-hydrochlorothiazide (MAXZIDE-25) 37.5-25 MG per tablet; Take 1 tablet by mouth Daily.  -     Comprehensive Metabolic Panel; Future  -     CBC No Differential; Future  -     Comprehensive Metabolic Panel  -     CBC No Differential    Mixed hyperlipidemia  Comments:  not fasting today   Orders:  -     atorvastatin (LIPITOR) 20 MG tablet; Take 1 tablet by mouth Daily.      He has been monitoring blood pressure at home and readings 120-80. He would like to reduce Norvasc  To 5 mg. He will monitor and if readings greater than 140/90 he will resume 10 mg dosing, discussed low salt diet and exercise.     He will return for physical with fasting labs.

## 2019-08-29 DIAGNOSIS — I10 ESSENTIAL HYPERTENSION: ICD-10-CM

## 2019-08-29 RX ORDER — AMLODIPINE BESYLATE 5 MG/1
TABLET ORAL
Qty: 30 TABLET | Refills: 1 | Status: SHIPPED | OUTPATIENT
Start: 2019-08-29 | End: 2020-02-04

## 2019-10-13 DIAGNOSIS — I10 BENIGN ESSENTIAL HTN: ICD-10-CM

## 2019-10-14 RX ORDER — AMLODIPINE BESYLATE 10 MG/1
TABLET ORAL
Qty: 90 TABLET | Refills: 1 | Status: SHIPPED | OUTPATIENT
Start: 2019-10-14 | End: 2020-02-10

## 2020-01-27 DIAGNOSIS — E78.2 MIXED HYPERLIPIDEMIA: ICD-10-CM

## 2020-01-27 DIAGNOSIS — I10 ESSENTIAL HYPERTENSION: ICD-10-CM

## 2020-01-27 RX ORDER — ATORVASTATIN CALCIUM 20 MG/1
TABLET, FILM COATED ORAL
Qty: 90 TABLET | Refills: 1 | Status: SHIPPED | OUTPATIENT
Start: 2020-01-27 | End: 2020-07-29

## 2020-01-27 RX ORDER — TRIAMTERENE AND HYDROCHLOROTHIAZIDE 37.5; 25 MG/1; MG/1
TABLET ORAL
Qty: 90 TABLET | Refills: 1 | Status: SHIPPED | OUTPATIENT
Start: 2020-01-27 | End: 2020-07-29

## 2020-02-04 ENCOUNTER — OFFICE VISIT (OUTPATIENT)
Dept: INTERNAL MEDICINE | Facility: CLINIC | Age: 45
End: 2020-02-04

## 2020-02-04 VITALS
BODY MASS INDEX: 25.2 KG/M2 | WEIGHT: 180 LBS | DIASTOLIC BLOOD PRESSURE: 84 MMHG | OXYGEN SATURATION: 99 % | SYSTOLIC BLOOD PRESSURE: 124 MMHG | HEIGHT: 71 IN | HEART RATE: 76 BPM

## 2020-02-04 DIAGNOSIS — Z00.00 ANNUAL PHYSICAL EXAM: Primary | ICD-10-CM

## 2020-02-04 LAB
APPEARANCE UR: CLEAR
BILIRUB UR QL STRIP: NEGATIVE
COLOR UR: YELLOW
GLUCOSE UR QL: NEGATIVE
HGB UR QL STRIP: NEGATIVE
KETONES UR QL STRIP: ABNORMAL
LEUKOCYTE ESTERASE UR QL STRIP: NEGATIVE
NITRITE UR QL STRIP: NEGATIVE
PH UR STRIP: 5.5 [PH] (ref 5–8)
PROT UR QL STRIP: NEGATIVE
SP GR UR: 1.02 (ref 1–1.03)
UROBILINOGEN UR STRIP-MCNC: ABNORMAL MG/DL

## 2020-02-04 PROCEDURE — 93000 ELECTROCARDIOGRAM COMPLETE: CPT | Performed by: NURSE PRACTITIONER

## 2020-02-04 PROCEDURE — 99396 PREV VISIT EST AGE 40-64: CPT | Performed by: NURSE PRACTITIONER

## 2020-02-04 NOTE — PATIENT INSTRUCTIONS
Health Maintenance, Male  A healthy lifestyle and preventive care is important for your health and wellness. Ask your health care provider about what schedule of regular examinations is right for you.  What should I know about weight and diet?  Eat a Healthy Diet  · Eat plenty of vegetables, fruits, whole grains, low-fat dairy products, and lean protein.  · Do not eat a lot of foods high in solid fats, added sugars, or salt.    Maintain a Healthy Weight  Regular exercise can help you achieve or maintain a healthy weight. You should:  · Do at least 150 minutes of exercise each week. The exercise should increase your heart rate and make you sweat (moderate-intensity exercise).  · Do strength-training exercises at least twice a week.  Watch Your Levels of Cholesterol and Blood Lipids  · Have your blood tested for lipids and cholesterol every 5 years starting at 35 years of age. If you are at high risk for heart disease, you should start having your blood tested when you are 20 years old. You may need to have your cholesterol levels checked more often if:  ? Your lipid or cholesterol levels are high.  ? You are older than 50 years of age.  ? You are at high risk for heart disease.  What should I know about cancer screening?  Many types of cancers can be detected early and may often be prevented.  Lung Cancer  · You should be screened every year for lung cancer if:  ? You are a current smoker who has smoked for at least 30 years.  ? You are a former smoker who has quit within the past 15 years.  · Talk to your health care provider about your screening options, when you should start screening, and how often you should be screened.  Colorectal Cancer  · Routine colorectal cancer screening usually begins at 50 years of age and should be repeated every 5-10 years until you are 75 years old. You may need to be screened more often if early forms of precancerous polyps or small growths are found. Your health care provider may  recommend screening at an earlier age if you have risk factors for colon cancer.  · Your health care provider may recommend using home test kits to check for hidden blood in the stool.  · A small camera at the end of a tube can be used to examine your colon (sigmoidoscopy or colonoscopy). This checks for the earliest forms of colorectal cancer.  Prostate and Testicular Cancer  · Depending on your age and overall health, your health care provider may do certain tests to screen for prostate and testicular cancer.  · Talk to your health care provider about any symptoms or concerns you have about testicular or prostate cancer.  Skin Cancer  · Check your skin from head to toe regularly.  · Tell your health care provider about any new moles or changes in moles, especially if:  ? There is a change in a mole’s size, shape, or color.  ? You have a mole that is larger than a pencil eraser.  · Always use sunscreen. Apply sunscreen liberally and repeat throughout the day.  · Protect yourself by wearing long sleeves, pants, a wide-brimmed hat, and sunglasses when outside.  What should I know about heart disease, diabetes, and high blood pressure?  · If you are 18-39 years of age, have your blood pressure checked every 3-5 years. If you are 40 years of age or older, have your blood pressure checked every year. You should have your blood pressure measured twice--once when you are at a hospital or clinic, and once when you are not at a hospital or clinic. Record the average of the two measurements. To check your blood pressure when you are not at a hospital or clinic, you can use:  ? An automated blood pressure machine at a pharmacy.  ? A home blood pressure monitor.  · Talk to your health care provider about your target blood pressure.  · If you are between 45-79 years old, ask your health care provider if you should take aspirin to prevent heart disease.  · Have regular diabetes screenings by checking your fasting blood sugar  level.  ? If you are at a normal weight and have a low risk for diabetes, have this test once every three years after the age of 45.  ? If you are overweight and have a high risk for diabetes, consider being tested at a younger age or more often.  · A one-time screening for abdominal aortic aneurysm (AAA) by ultrasound is recommended for men aged 65-75 years who are current or former smokers.  What should I know about preventing infection?  Hepatitis B  If you have a higher risk for hepatitis B, you should be screened for this virus. Talk with your health care provider to find out if you are at risk for hepatitis B infection.  Hepatitis C  Blood testing is recommended for:  · Everyone born from 1945 through 1965.  · Anyone with known risk factors for hepatitis C.  Sexually Transmitted Diseases (STDs)  · You should be screened each year for STDs including gonorrhea and chlamydia if:  ? You are sexually active and are younger than 24 years of age.  ? You are older than 24 years of age and your health care provider tells you that you are at risk for this type of infection.  ? Your sexual activity has changed since you were last screened and you are at an increased risk for chlamydia or gonorrhea. Ask your health care provider if you are at risk.  · Talk with your health care provider about whether you are at high risk of being infected with HIV. Your health care provider may recommend a prescription medicine to help prevent HIV infection.  What else can I do?  · Schedule regular health, dental, and eye exams.  · Stay current with your vaccines (immunizations).  · Do not use any tobacco products, such as cigarettes, chewing tobacco, and e-cigarettes. If you need help quitting, ask your health care provider.  · Limit alcohol intake to no more than 2 drinks per day. One drink equals 12 ounces of beer, 5 ounces of wine, or 1½ ounces of hard liquor.  · Do not use street drugs.  · Do not share needles.  · Ask your health  care provider for help if you need support or information about quitting drugs.  · Tell your health care provider if you often feel depressed.  · Tell your health care provider if you have ever been abused or do not feel safe at home.  This information is not intended to replace advice given to you by your health care provider. Make sure you discuss any questions you have with your health care provider.  Document Released: 06/15/2009 Document Revised: 08/16/2017 Document Reviewed: 09/20/2016  [employer] Interactive Patient Education © 2019 [employer]

## 2020-02-04 NOTE — PROGRESS NOTES
Yue Reyes is a 44 y.o. male.     .Well Adult Physical   Patient here for a comprehensive physical exam.The patient reports no problems    Do you take any herbs or supplements that were not prescribed by a doctor? no   Are you taking calcium supplements? no   Are you taking aspirin daily? no      He works full time. He is monitoring his blood pressure routinely and readings less than 140/90. He is exercising 3-4 times a week. He eating fair diet (no pork, vegetables, etc.)He is due for dentist today.                The following portions of the patient's history were reviewed and updated as appropriate: allergies, current medications, past family history, past medical history, past social history, past surgical history and problem list.    Review of Systems   Constitutional: Negative for activity change, appetite change, chills, fatigue, fever and unexpected weight change.        Overall doing well    HENT: Negative for congestion, ear discharge, ear pain, hearing loss, mouth sores, nosebleeds, postnasal drip, rhinorrhea, sinus pressure, sneezing, sore throat, tinnitus and voice change.    Eyes: Negative for visual disturbance.   Respiratory: Negative for cough, chest tightness, shortness of breath and wheezing.    Cardiovascular: Negative for chest pain, palpitations and leg swelling.   Gastrointestinal: Negative for abdominal distention, abdominal pain, anal bleeding, blood in stool, constipation, diarrhea, nausea and vomiting.   Endocrine: Negative for cold intolerance, heat intolerance, polydipsia, polyphagia and polyuria.   Genitourinary: Positive for frequency (r/t diuretic ). Negative for difficulty urinating, discharge, hematuria, penile pain, penile swelling, scrotal swelling, testicular pain and urgency.   Musculoskeletal: Negative for arthralgias, back pain, gait problem, joint swelling, myalgias, neck pain and neck stiffness.   Skin: Negative for color change, pallor and rash.    Neurological: Negative for dizziness, tremors, seizures, syncope, speech difficulty, weakness, light-headedness, numbness and headaches.   Hematological: Negative for adenopathy. Does not bruise/bleed easily.   Psychiatric/Behavioral: Negative for confusion, decreased concentration, dysphoric mood, sleep disturbance and suicidal ideas. The patient is not nervous/anxious.        Objective   Physical Exam   Constitutional: He is oriented to person, place, and time. He appears well-developed and well-nourished. No distress.   HENT:   Head: Normocephalic and atraumatic.   Right Ear: Hearing, tympanic membrane, external ear and ear canal normal.   Left Ear: Hearing, tympanic membrane, external ear and ear canal normal.   Nose: Nose normal. Right sinus exhibits no maxillary sinus tenderness and no frontal sinus tenderness. Left sinus exhibits no maxillary sinus tenderness and no frontal sinus tenderness.   Mouth/Throat: Uvula is midline, oropharynx is clear and moist and mucous membranes are normal. No tonsillar exudate.   Eyes: Pupils are equal, round, and reactive to light. Conjunctivae, EOM and lids are normal. Right eye exhibits no discharge. Left eye exhibits no discharge. No scleral icterus.   Neck: Normal range of motion. Neck supple. No JVD present. Carotid bruit is not present. No tracheal deviation present. No thyroid mass and no thyromegaly present.   Cardiovascular: Normal rate, regular rhythm, normal heart sounds and intact distal pulses. Exam reveals no gallop and no friction rub.   No murmur heard.  Repeat bp left arm 124/78  No pedal edema    Pulmonary/Chest: Effort normal and breath sounds normal. No respiratory distress. He has no wheezes. He has no rales. He exhibits no tenderness.   Abdominal: Soft. Bowel sounds are normal. He exhibits no distension and no mass. There is no tenderness. There is no rebound and no guarding. No hernia. Hernia confirmed negative in the right inguinal area and confirmed  negative in the left inguinal area.   Genitourinary: Rectum normal, prostate normal, testes normal and penis normal. Rectal exam shows guaiac negative stool.   Musculoskeletal: Normal range of motion. He exhibits no edema.   (-)SLR    Lymphadenopathy:        Head (right side): No submental, no submandibular, no tonsillar, no preauricular, no posterior auricular and no occipital adenopathy present.        Head (left side): No submental, no submandibular, no tonsillar, no preauricular, no posterior auricular and no occipital adenopathy present.     He has no cervical adenopathy. No inguinal adenopathy noted on the right or left side.   Neurological: He is alert and oriented to person, place, and time. He has normal reflexes. No cranial nerve deficit. He exhibits normal muscle tone. Coordination normal.   Reflex Scores:       Patellar reflexes are 2+ on the right side and 2+ on the left side.  Skin: Skin is warm and dry. No rash noted. No erythema.   Multiple tattoos    Psychiatric: He has a normal mood and affect. His speech is normal and behavior is normal. Judgment and thought content normal. Cognition and memory are normal.   Vitals reviewed.      Assessment/Plan   Sadi was seen today for annual exam.    Diagnoses and all orders for this visit:    Annual physical exam  -     ECG 12 Lead  -     Comprehensive Metabolic Panel; Future  -     Lipid Panel; Future  -     Urinalysis With Microscopic If Indicated (No Culture) - Urine, Clean Catch; Future  -     CBC Auto Differential; Future  -     TSH Rfx On Abnormal To Free T4; Future      ECG 12 Lead  Date/Time: 2/4/2020 10:59 AM  Performed by: Christine Mullins APRN  Authorized by: Christine Mullins APRN   Comparison: not compared with previous ECG   Rhythm: sinus bradycardia  Rate: bradycardic  Conduction: conduction normal  ST Segments: ST segments normal  T Waves: T waves normal  QRS axis: normal    Clinical impression: normal ECG        He will continue with  exercise 3-4 times a week at least 30 minutes with healthy diet.   He was advised to have routine eye exam  He would like to reduce norvasc 10 to 5 mg. He will provide bp readings in 3 weeks and will discuss if ok to reduce dosing.

## 2020-02-05 LAB
ALBUMIN SERPL-MCNC: 5 G/DL (ref 3.5–5.2)
ALBUMIN/GLOB SERPL: 1.9 G/DL
ALP SERPL-CCNC: 54 U/L (ref 39–117)
ALT SERPL-CCNC: 26 U/L (ref 1–41)
AST SERPL-CCNC: 22 U/L (ref 1–40)
BASOPHILS # BLD AUTO: 0.03 10*3/MM3 (ref 0–0.2)
BASOPHILS NFR BLD AUTO: 0.5 % (ref 0–1.5)
BILIRUB SERPL-MCNC: 0.6 MG/DL (ref 0.2–1.2)
BUN SERPL-MCNC: 12 MG/DL (ref 6–20)
BUN/CREAT SERPL: 10 (ref 7–25)
CALCIUM SERPL-MCNC: 10 MG/DL (ref 8.6–10.5)
CHLORIDE SERPL-SCNC: 99 MMOL/L (ref 98–107)
CHOLEST SERPL-MCNC: 194 MG/DL (ref 0–200)
CO2 SERPL-SCNC: 26.7 MMOL/L (ref 22–29)
CREAT SERPL-MCNC: 1.2 MG/DL (ref 0.76–1.27)
EOSINOPHIL # BLD AUTO: 0.02 10*3/MM3 (ref 0–0.4)
EOSINOPHIL NFR BLD AUTO: 0.3 % (ref 0.3–6.2)
ERYTHROCYTE [DISTWIDTH] IN BLOOD BY AUTOMATED COUNT: 13.1 % (ref 12.3–15.4)
GLOBULIN SER CALC-MCNC: 2.7 GM/DL
GLUCOSE SERPL-MCNC: 85 MG/DL (ref 65–99)
HCT VFR BLD AUTO: 48.4 % (ref 37.5–51)
HDLC SERPL-MCNC: 52 MG/DL (ref 40–60)
HGB BLD-MCNC: 16.5 G/DL (ref 13–17.7)
IMM GRANULOCYTES # BLD AUTO: 0.01 10*3/MM3 (ref 0–0.05)
IMM GRANULOCYTES NFR BLD AUTO: 0.2 % (ref 0–0.5)
LDLC SERPL CALC-MCNC: 123 MG/DL (ref 0–100)
LYMPHOCYTES # BLD AUTO: 2.65 10*3/MM3 (ref 0.7–3.1)
LYMPHOCYTES NFR BLD AUTO: 40.2 % (ref 19.6–45.3)
MCH RBC QN AUTO: 31 PG (ref 26.6–33)
MCHC RBC AUTO-ENTMCNC: 34.1 G/DL (ref 31.5–35.7)
MCV RBC AUTO: 91 FL (ref 79–97)
MONOCYTES # BLD AUTO: 0.49 10*3/MM3 (ref 0.1–0.9)
MONOCYTES NFR BLD AUTO: 7.4 % (ref 5–12)
NEUTROPHILS # BLD AUTO: 3.4 10*3/MM3 (ref 1.7–7)
NEUTROPHILS NFR BLD AUTO: 51.4 % (ref 42.7–76)
NRBC BLD AUTO-RTO: 0 /100 WBC (ref 0–0.2)
PLATELET # BLD AUTO: 228 10*3/MM3 (ref 140–450)
POTASSIUM SERPL-SCNC: 4.5 MMOL/L (ref 3.5–5.2)
PROT SERPL-MCNC: 7.7 G/DL (ref 6–8.5)
RBC # BLD AUTO: 5.32 10*6/MM3 (ref 4.14–5.8)
SODIUM SERPL-SCNC: 138 MMOL/L (ref 136–145)
TRIGL SERPL-MCNC: 93 MG/DL (ref 0–150)
TSH SERPL DL<=0.005 MIU/L-ACNC: 0.62 UIU/ML (ref 0.27–4.2)
VLDLC SERPL CALC-MCNC: 18.6 MG/DL
WBC # BLD AUTO: 6.6 10*3/MM3 (ref 3.4–10.8)

## 2020-02-10 DIAGNOSIS — I10 BENIGN ESSENTIAL HTN: ICD-10-CM

## 2020-02-10 RX ORDER — AMLODIPINE BESYLATE 5 MG/1
5 TABLET ORAL DAILY
Qty: 30 TABLET | Refills: 1 | Status: SHIPPED | OUTPATIENT
Start: 2020-02-10 | End: 2020-02-17 | Stop reason: SDUPTHER

## 2020-02-17 DIAGNOSIS — I10 BENIGN ESSENTIAL HTN: ICD-10-CM

## 2020-02-17 RX ORDER — AMLODIPINE BESYLATE 5 MG/1
5 TABLET ORAL DAILY
Qty: 30 TABLET | Refills: 1 | Status: SHIPPED | OUTPATIENT
Start: 2020-02-17 | End: 2020-03-25

## 2020-03-25 DIAGNOSIS — I10 BENIGN ESSENTIAL HTN: ICD-10-CM

## 2020-03-25 RX ORDER — AMLODIPINE BESYLATE 5 MG/1
TABLET ORAL
Qty: 30 TABLET | Refills: 3 | Status: SHIPPED | OUTPATIENT
Start: 2020-03-25 | End: 2020-07-22

## 2020-03-26 ENCOUNTER — TELEPHONE (OUTPATIENT)
Dept: INTERNAL MEDICINE | Facility: CLINIC | Age: 45
End: 2020-03-26

## 2020-03-26 ENCOUNTER — HOSPITAL ENCOUNTER (EMERGENCY)
Facility: HOSPITAL | Age: 45
Discharge: HOME | End: 2020-03-26
Attending: EMERGENCY MEDICINE | Admitting: EMERGENCY MEDICINE

## 2020-03-26 ENCOUNTER — APPOINTMENT (OUTPATIENT)
Dept: CT IMAGING | Facility: HOSPITAL | Age: 45
End: 2020-03-26

## 2020-03-26 VITALS
BODY MASS INDEX: 25.2 KG/M2 | OXYGEN SATURATION: 97 % | HEIGHT: 71 IN | RESPIRATION RATE: 16 BRPM | WEIGHT: 180 LBS | HEART RATE: 59 BPM | DIASTOLIC BLOOD PRESSURE: 98 MMHG | SYSTOLIC BLOOD PRESSURE: 133 MMHG | TEMPERATURE: 98.2 F

## 2020-03-26 DIAGNOSIS — Z86.79 HISTORY OF HYPERTENSION: ICD-10-CM

## 2020-03-26 DIAGNOSIS — R51.9 NONINTRACTABLE EPISODIC HEADACHE, UNSPECIFIED HEADACHE TYPE: Primary | ICD-10-CM

## 2020-03-26 PROCEDURE — 99282 EMERGENCY DEPT VISIT SF MDM: CPT

## 2020-03-26 PROCEDURE — 70450 CT HEAD/BRAIN W/O DYE: CPT

## 2020-03-26 NOTE — TELEPHONE ENCOUNTER
Regarding: Complaint  Contact: [phone]  ----- Message from Roman Spears MA sent at 3/26/2020 10:28 AM EDT -----       ----- Message from Sadi Reyes to Christine Mullins APRN sent at 3/26/2020 10:27 AM -----   Messaging you before I go to emergency room.

## 2020-03-26 NOTE — TELEPHONE ENCOUNTER
----- Message from Roberta Catherine sent at 3/26/2020 11:28 AM EDT -----  Regarding: FW: Complaint  Contact: [phone]      ----- Message -----  From: Sadi Reyes  Sent: 3/26/2020  11:02 AM EDT  To: [facility]  Subject: Complaint                                        Sorry missed you call

## 2020-03-26 NOTE — TELEPHONE ENCOUNTER
I called patient to discuss her mychart message-headache. There was no answer so I left a message for patient to return call.

## 2020-03-26 NOTE — TELEPHONE ENCOUNTER
Spoke with patient he reports he was having sharp pain in his head. He is currently at ER at [facility]. I will review notes and he will follow up in office.

## 2020-07-22 DIAGNOSIS — I10 BENIGN ESSENTIAL HTN: ICD-10-CM

## 2020-07-22 RX ORDER — AMLODIPINE BESYLATE 5 MG/1
TABLET ORAL
Qty: 90 TABLET | Refills: 1 | Status: SHIPPED | OUTPATIENT
Start: 2020-07-22 | End: 2020-10-28

## 2020-07-27 ENCOUNTER — HOSPITAL ENCOUNTER (EMERGENCY)
Facility: HOSPITAL | Age: 45
Discharge: HOME | End: 2020-07-27
Attending: EMERGENCY MEDICINE | Admitting: EMERGENCY MEDICINE

## 2020-07-27 VITALS
HEART RATE: 85 BPM | SYSTOLIC BLOOD PRESSURE: 139 MMHG | OXYGEN SATURATION: 98 % | RESPIRATION RATE: 18 BRPM | TEMPERATURE: 97 F | DIASTOLIC BLOOD PRESSURE: 91 MMHG

## 2020-07-27 DIAGNOSIS — R51.9 NONINTRACTABLE EPISODIC HEADACHE, UNSPECIFIED HEADACHE TYPE: Primary | ICD-10-CM

## 2020-07-27 PROCEDURE — 99282 EMERGENCY DEPT VISIT SF MDM: CPT

## 2020-07-27 RX ORDER — TRAMADOL HYDROCHLORIDE 50 MG/1
50 TABLET ORAL EVERY 6 HOURS PRN
Qty: 20 TABLET | Refills: 0 | Status: SHIPPED | OUTPATIENT
Start: 2020-07-27 | End: 2020-12-11

## 2020-07-27 NOTE — ED TRIAGE NOTES
"Patient to er with c/o \" sharp pain all across his head.\" patient reported the pain started over the weekend and getting worse.\" patient has mask on in triage along with triage staff.   "

## 2020-07-27 NOTE — ED PROVIDER NOTES
EMERGENCY DEPARTMENT ENCOUNTER    Room Number:  35/35  Date of encounter:  7/27/2020  PCP: Christine Mullins APRN  Historian: Patient      HPI:  Chief Complaint: Headache  A complete HPI/ROS/PMH/PSH/SH/FH are unobtainable due to: N/A    Context: Sadi Reyes is a 44 y.o. male who presents to the ED c/o right-sided headache for a few days.  He has a history of intermittent headaches involving the left side of his head.  He has been reporting some vision changes associate with these headaches.  Today, the pain is mostly on the right side in the retro-orbital region.  He does have some mild ear pain, but reports no drainage or discharge.  His hearing is intact.  No recent fevers or chills.  No cough congestion or chest pain.      The patient was placed in a mask in triage, hand hygiene was performed before and after my interaction with the patient.  I wore a mask, safety glasses and gloves during my entire interaction with the patient.    PAST MEDICAL HISTORY  Active Ambulatory Problems     Diagnosis Date Noted   • No Active Ambulatory Problems     Resolved Ambulatory Problems     Diagnosis Date Noted   • No Resolved Ambulatory Problems     Past Medical History:   Diagnosis Date   • Carpal tunnel syndrome    • Hypertension    • Palpitations          PAST SURGICAL HISTORY  No past surgical history on file.      FAMILY HISTORY  Family History   Problem Relation Age of Onset   • Hypertension Mother    • No Known Problems Father    • No Known Problems Sister          SOCIAL HISTORY  Social History     Socioeconomic History   • Marital status:      Spouse name: Not on file   • Number of children: Not on file   • Years of education: Not on file   • Highest education level: Not on file   Tobacco Use   • Smoking status: Never Smoker   • Smokeless tobacco: Never Used   Substance and Sexual Activity   • Alcohol use: Yes   • Drug use: Yes     Frequency: 1.0 times per week     Types: Marijuana   • Sexual activity: Yes      Partners: Female         ALLERGIES  Patient has no known allergies.        REVIEW OF SYSTEMS  Review of Systems   Constitutional: Negative for activity change, appetite change and fever.   HENT: Positive for ear pain. Negative for congestion, ear discharge, facial swelling, hearing loss, sinus pain, sore throat and tinnitus.    Eyes: Negative.    Respiratory: Negative for cough and shortness of breath.    Cardiovascular: Negative for chest pain and leg swelling.   Gastrointestinal: Negative for abdominal pain, diarrhea and vomiting.   Endocrine: Negative.    Genitourinary: Negative for decreased urine volume and dysuria.   Musculoskeletal: Negative for neck pain.   Skin: Negative for rash and wound.   Allergic/Immunologic: Negative.    Neurological: Positive for headaches. Negative for weakness and numbness.   Hematological: Negative.    Psychiatric/Behavioral: Negative.    All other systems reviewed and are negative.       All systems reviewed and negative except for those discussed in HPI.       PHYSICAL EXAM    I have reviewed the triage vital signs and nursing notes.    ED Triage Vitals [07/27/20 0838]   Temp Heart Rate Resp BP SpO2   97 °F (36.1 °C) 85 -- -- 98 %      Temp src Heart Rate Source Patient Position BP Location FiO2 (%)   Tympanic -- -- -- --       Physical Exam   Constitutional: Pt. is oriented to person, place, and time and well-developed, well-nourished, and in no distress. No distress.   HENT: Normocephalic and atraumatic,  EOM are normal. Pupils are equal, round, and reactive to light. Oropharynx moist/nonerythematous.  TMs are normal bilaterally.  There is no mastoid tenderness.  Neck: Normal range of motion. Neck supple. No JVD present. No tracheal deviation present. No thyromegaly present.   Cardiovascular: Normal rate, regular rhythm and normal heart sounds. Exam reveals no gallop and no friction rub.   No murmur heard.  Pulmonary/Chest: Effort normal and breath sounds normal. No  stridor. No respiratory distress. No wheezes, no rales.   Abdominal: Soft. Bowel sounds are normal. No distension. There is no tenderness. There is no rebound and no guarding.   Musculoskeletal: Normal range of motion. No edema, tenderness or deformity.   Neurological: Pt. is alert and oriented to person, place, and time. Pt. has normal sensation and normal strength. No cranial nerve deficit. GCS score is 15.   Skin: Skin is warm and dry. No rash noted. Pt. is not diaphoretic. No erythema.   Psychiatric: Mood, affect and judgment normal.   Nursing note and vitals reviewed.        LAB RESULTS  No results found for this or any previous visit (from the past 24 hour(s)).    Ordered the above labs and independently reviewed the results.        RADIOLOGY  No Radiology Exams Resulted Within Past 24 Hours    I ordered the above noted radiological studies. Reviewed by me and discussed with radiologist.  See dictation for official radiology interpretation.      PROCEDURES    Procedures      MEDICATIONS GIVEN IN ER    Medications - No data to display      PROGRESS, DATA ANALYSIS, CONSULTS, AND MEDICAL DECISION MAKING    Any/all labs have been independently reviewed by me.  Any/all radiology studies have been reviewed by me and discussed with radiologist dictating the report.   EKG's independently viewed and interpreted by me.  Discussion below represents my analysis of pertinent findings related to patient's condition, differential diagnosis, treatment plan and final disposition.      ED Course as of Jul 27 1014   Mon Jul 27, 2020   1002 Prior record review: The patient was seen in March of this year for headaches, CT was unremarkable.    [WC]   1012 His headache does not sound suspicious for intracerebral hemorrhage.  He does have some migrainous components, I advised him to keep a headache diary for now.  We will try short course of tramadol.  He has a appointment with his primary care doctor next week.    [WC]      ED  Course User Index  [WC] Charles Larose MD       AS OF 10:14 VITALS:    BP -    HR - 85  TEMP - 97 °F (36.1 °C) (Tympanic)  02 SATS - 98%        DIAGNOSIS  Final diagnoses:   Nonintractable episodic headache, unspecified headache type         DISPOSITION  Discharged           Charles Larose MD  07/27/20 1014

## 2020-07-29 DIAGNOSIS — I10 ESSENTIAL HYPERTENSION: ICD-10-CM

## 2020-07-29 DIAGNOSIS — E78.2 MIXED HYPERLIPIDEMIA: ICD-10-CM

## 2020-07-29 RX ORDER — ATORVASTATIN CALCIUM 20 MG/1
TABLET, FILM COATED ORAL
Qty: 90 TABLET | Refills: 1 | Status: SHIPPED | OUTPATIENT
Start: 2020-07-29 | End: 2020-10-28

## 2020-07-29 RX ORDER — TRIAMTERENE AND HYDROCHLOROTHIAZIDE 37.5; 25 MG/1; MG/1
TABLET ORAL
Qty: 90 TABLET | Refills: 1 | Status: SHIPPED | OUTPATIENT
Start: 2020-07-29 | End: 2020-10-28

## 2020-08-04 ENCOUNTER — OFFICE VISIT (OUTPATIENT)
Dept: INTERNAL MEDICINE | Facility: CLINIC | Age: 45
End: 2020-08-04

## 2020-08-04 VITALS
HEART RATE: 60 BPM | WEIGHT: 183 LBS | BODY MASS INDEX: 25.62 KG/M2 | HEIGHT: 71 IN | SYSTOLIC BLOOD PRESSURE: 124 MMHG | OXYGEN SATURATION: 98 % | DIASTOLIC BLOOD PRESSURE: 82 MMHG

## 2020-08-04 DIAGNOSIS — I10 ESSENTIAL HYPERTENSION: Primary | ICD-10-CM

## 2020-08-04 DIAGNOSIS — R51.9 ACUTE NONINTRACTABLE HEADACHE, UNSPECIFIED HEADACHE TYPE: ICD-10-CM

## 2020-08-04 DIAGNOSIS — Z11.59 SCREENING FOR VIRAL DISEASE: ICD-10-CM

## 2020-08-04 DIAGNOSIS — E78.2 MIXED HYPERLIPIDEMIA: ICD-10-CM

## 2020-08-04 PROCEDURE — 99214 OFFICE O/P EST MOD 30 MIN: CPT | Performed by: NURSE PRACTITIONER

## 2020-08-04 NOTE — PROGRESS NOTES
Yue Reyes is a 44 y.o. male.     Overall doing ok. He did have to ER on 7/27/2020 due to right temporal headache that lasted for 3-4 days. There was no workup as his symptoms had resolved. He had eye exam last week and everything looked well. He is checking blood pressure and readings less than 140/90.  No increased stressors. He is exercising some (weight lifting) a couple days week, treadmill once a week. He is also very busy at work.     Hypertension   This is a chronic problem. The current episode started more than 1 year ago. The problem is unchanged. The problem is controlled. Associated symptoms include headaches (went to ER on 7/27/2020; and it resolved right side only ). Pertinent negatives include no anxiety, chest pain, palpitations or shortness of breath. Current antihypertension treatment includes calcium channel blockers and diuretics. The current treatment provides significant improvement.   Hyperlipidemia   This is a chronic problem. The current episode started more than 1 year ago. The problem is controlled. Recent lipid tests were reviewed and are normal. Pertinent negatives include no chest pain, leg pain, myalgias or shortness of breath. Current antihyperlipidemic treatment includes statins. The current treatment provides significant improvement of lipids.        The following portions of the patient's history were reviewed and updated as appropriate: allergies, current medications, past family history, past medical history, past social history, past surgical history and problem list.    Review of Systems   Constitutional: Negative for activity change, appetite change, fatigue and fever.   Respiratory: Negative for cough, shortness of breath and wheezing.    Cardiovascular: Negative for chest pain, palpitations and leg swelling.   Musculoskeletal: Negative for myalgias.   Neurological: Positive for headaches (went to ER on 7/27/2020; and it resolved right side only ). Negative  for dizziness and light-headedness.       Objective   Physical Exam   Constitutional: He is oriented to person, place, and time. He appears well-developed and well-nourished.   HENT:   Head: Normocephalic.   Nose: Nose normal.   Neck: Carotid bruit is not present. No thyroid mass and no thyromegaly present.   Cardiovascular: Regular rhythm and normal heart sounds. Exam reveals no S3 and no S4.   No murmur heard.  Repeat bp left arm 132/88  No pedal edema    Pulmonary/Chest: Effort normal and breath sounds normal. He has no decreased breath sounds. He has no wheezes. He has no rhonchi. He has no rales.   Musculoskeletal: He exhibits no edema.   Neurological: He is alert and oriented to person, place, and time. Gait normal.   Skin: Skin is warm and dry.   Psychiatric: He has a normal mood and affect.       Assessment/Plan   Sadi was seen today for hypertension.    Diagnoses and all orders for this visit:    Essential hypertension  -     Comprehensive Metabolic Panel  -     CBC & Differential  -     TSH Rfx On Abnormal To Free T4    Mixed hyperlipidemia  -     Lipid Panel    Screening for viral disease  -     Hepatitis C Antibody    Acute nonintractable headache, unspecified headache type  Comments:  resolved       He declines TDAP today.   He was advised to cardio exercise at least 150 minutes weekly and healthy diet with low salt.   HTN: well controlled  HLD: tolerating statin therapy; needs low fat/cholesterol diet  Headache: resolved, he will monitor if returns will consider further workup-he will submit [facility] paperwork per his job request (will not accept letter)- he had taken off on 7/27/2020 and today for appointment.

## 2020-08-05 LAB
ALBUMIN SERPL-MCNC: 4.9 G/DL (ref 3.5–5.2)
ALBUMIN/GLOB SERPL: 2.3 G/DL
ALP SERPL-CCNC: 46 U/L (ref 39–117)
ALT SERPL-CCNC: 32 U/L (ref 1–41)
AST SERPL-CCNC: 26 U/L (ref 1–40)
BASOPHILS # BLD AUTO: 0.04 10*3/MM3 (ref 0–0.2)
BASOPHILS NFR BLD AUTO: 0.6 % (ref 0–1.5)
BILIRUB SERPL-MCNC: 0.5 MG/DL (ref 0–1.2)
BUN SERPL-MCNC: 12 MG/DL (ref 6–20)
BUN/CREAT SERPL: 11.7 (ref 7–25)
CALCIUM SERPL-MCNC: 9.5 MG/DL (ref 8.6–10.5)
CHLORIDE SERPL-SCNC: 100 MMOL/L (ref 98–107)
CHOLEST SERPL-MCNC: 156 MG/DL (ref 0–200)
CO2 SERPL-SCNC: 28.1 MMOL/L (ref 22–29)
CREAT SERPL-MCNC: 1.03 MG/DL (ref 0.76–1.27)
EOSINOPHIL # BLD AUTO: 0.04 10*3/MM3 (ref 0–0.4)
EOSINOPHIL NFR BLD AUTO: 0.6 % (ref 0.3–6.2)
ERYTHROCYTE [DISTWIDTH] IN BLOOD BY AUTOMATED COUNT: 12.9 % (ref 12.3–15.4)
GLOBULIN SER CALC-MCNC: 2.1 GM/DL
GLUCOSE SERPL-MCNC: 89 MG/DL (ref 65–99)
HCT VFR BLD AUTO: 47.7 % (ref 37.5–51)
HCV AB S/CO SERPL IA: 0.1 S/CO RATIO (ref 0–0.9)
HDLC SERPL-MCNC: 45 MG/DL (ref 40–60)
HGB BLD-MCNC: 16.1 G/DL (ref 13–17.7)
IMM GRANULOCYTES # BLD AUTO: 0.01 10*3/MM3 (ref 0–0.05)
IMM GRANULOCYTES NFR BLD AUTO: 0.2 % (ref 0–0.5)
LDLC SERPL CALC-MCNC: 96 MG/DL (ref 0–100)
LYMPHOCYTES # BLD AUTO: 2.5 10*3/MM3 (ref 0.7–3.1)
LYMPHOCYTES NFR BLD AUTO: 39 % (ref 19.6–45.3)
MCH RBC QN AUTO: 31.4 PG (ref 26.6–33)
MCHC RBC AUTO-ENTMCNC: 33.8 G/DL (ref 31.5–35.7)
MCV RBC AUTO: 93 FL (ref 79–97)
MONOCYTES # BLD AUTO: 0.46 10*3/MM3 (ref 0.1–0.9)
MONOCYTES NFR BLD AUTO: 7.2 % (ref 5–12)
NEUTROPHILS # BLD AUTO: 3.36 10*3/MM3 (ref 1.7–7)
NEUTROPHILS NFR BLD AUTO: 52.4 % (ref 42.7–76)
NRBC BLD AUTO-RTO: 0 /100 WBC (ref 0–0.2)
PLATELET # BLD AUTO: 230 10*3/MM3 (ref 140–450)
POTASSIUM SERPL-SCNC: 4.2 MMOL/L (ref 3.5–5.2)
PROT SERPL-MCNC: 7 G/DL (ref 6–8.5)
RBC # BLD AUTO: 5.13 10*6/MM3 (ref 4.14–5.8)
SODIUM SERPL-SCNC: 138 MMOL/L (ref 136–145)
TRIGL SERPL-MCNC: 73 MG/DL (ref 0–150)
TSH SERPL DL<=0.005 MIU/L-ACNC: 0.78 UIU/ML (ref 0.27–4.2)
VLDLC SERPL CALC-MCNC: 14.6 MG/DL
WBC # BLD AUTO: 6.41 10*3/MM3 (ref 3.4–10.8)

## 2020-08-07 ENCOUNTER — PATIENT MESSAGE (OUTPATIENT)
Dept: INTERNAL MEDICINE | Facility: CLINIC | Age: 45
End: 2020-08-07

## 2020-08-07 NOTE — TELEPHONE ENCOUNTER
From: Sadi Reyes  To: Christine Mullins APRN  Sent: 8/7/2020 11:02 AM EDT  Subject: Non-Urgent Medical Question    Thank you

## 2020-08-14 ENCOUNTER — TELEPHONE (OUTPATIENT)
Dept: INTERNAL MEDICINE | Facility: CLINIC | Age: 45
End: 2020-08-14

## 2020-08-14 NOTE — TELEPHONE ENCOUNTER
I spoke to Mr. Reyes and informed him that his [facility] paperwork had been completed and faxed over to [employer].    He verbalized he understood and thank you.

## 2020-10-19 ENCOUNTER — TELEPHONE (OUTPATIENT)
Dept: NEUROLOGY | Facility: CLINIC | Age: 45
End: 2020-10-19

## 2020-10-19 ENCOUNTER — HOSPITAL ENCOUNTER (EMERGENCY)
Facility: HOSPITAL | Age: 45
Discharge: HOME | End: 2020-10-19
Attending: EMERGENCY MEDICINE | Admitting: EMERGENCY MEDICINE

## 2020-10-19 ENCOUNTER — TELEPHONE (OUTPATIENT)
Dept: INTERNAL MEDICINE | Facility: CLINIC | Age: 45
End: 2020-10-19

## 2020-10-19 VITALS
HEART RATE: 70 BPM | RESPIRATION RATE: 16 BRPM | TEMPERATURE: 97.8 F | DIASTOLIC BLOOD PRESSURE: 89 MMHG | HEIGHT: 71 IN | SYSTOLIC BLOOD PRESSURE: 142 MMHG | WEIGHT: 185 LBS | BODY MASS INDEX: 25.9 KG/M2 | OXYGEN SATURATION: 99 %

## 2020-10-19 DIAGNOSIS — G44.019 EPISODIC CLUSTER HEADACHE, NOT INTRACTABLE: Primary | ICD-10-CM

## 2020-10-19 PROCEDURE — 99282 EMERGENCY DEPT VISIT SF MDM: CPT

## 2020-10-19 NOTE — TELEPHONE ENCOUNTER
PT WAS SEEN IN [facility] ED ROOM TODAY 10- FOR Episodic cluster headache, not intractable. PER THE DISCHARGE SUMMARY PT IS TO   Schedule an appointment with [facility] NEUROLOGY (Neurology) in 2 days (10/21/2020); for further evaluation of your headaches. PLEASE ADVISE PROVIDER TO DETERMINE TIMEFRAME. PATIENT CAN BE REACHED [phone]

## 2020-10-19 NOTE — ED NOTES
Patient to er from home with c/o headache for the last two weeks. Patient reported some nausea and vomiting. Patient has mask on in triage along with staff.      Ulysses Leblanc RN  10/19/20 0927

## 2020-10-19 NOTE — ED NOTES
Nurse has on mask, glasses and face shield while in room with patient and patient wore his mask.      Viola Eagle RN  10/19/20 [id number]

## 2020-10-19 NOTE — TELEPHONE ENCOUNTER
PATIENT LEFT ER FOR HEAD PAIN , REFERRED TO NEUROLOGISTS BY ED DOCTOR. WANTED TO LET DR. REYES KNOW WHAT'S GOING ON AND IF SHE WANTS TO SEE HIM OR NOT.       PLEASE CALL PATENT [phone]

## 2020-10-19 NOTE — ED PROVIDER NOTES
EMERGENCY DEPARTMENT ENCOUNTER  Patient was placed in face mask in first look and the following protective measures were taken unless additional measures were taken and documented below in the ED course. Patient was wearing facemask when I entered the room and throughout our encounter. I wore full protective equipment throughout this patient encounter including a face mask, and gloves. Hand hygiene was performed before donning protective equipment and after removal when leaving the room.    Room Number:  40/40  Date of encounter:  10/19/2020  PCP: Christine Mullins APRN    HPI:  Context: Sadi Reyes is a 45 y.o. male who presents to the ED c/o chief complaint of headache.  Patient reports that for the last 2 weeks he has been having intermittent right-sided headaches.  Patient describes the headache as sharp, located behind his right eye, headache is brief but is recurring, occurs in clusters.  Patient has lacrimation as well as rhinorrhea on the affected side associate with the headaches.  Patient denies any aggravating or ameliorating factors.  Patient denies any confusion, difficulty with speech, word finding difficulty, weakness or numbness.  No ringing in his ears, no hearing loss, no difficulty with coordination, no difficulty with balance, no vertigo.  Patient denies any fever or systemic symptoms.  No rashes.  Patient denies any recent head trauma.  Patient reports he has been seen in the emergency department 3 times for this, previously had CT head imaging which was negative, has followed up with his primary care, and has been evaluated by an ophthalmologist to rule out any eye pathology.  Patient has not been seen by a neurologist.    MEDICAL HISTORY REVIEW  Reviewed in EPIC    PAST MEDICAL HISTORY  Active Ambulatory Problems     Diagnosis Date Noted   • No Active Ambulatory Problems     Resolved Ambulatory Problems     Diagnosis Date Noted   • No Resolved Ambulatory Problems     Past Medical History:    Diagnosis Date   • Carpal tunnel syndrome    • Hypertension    • Palpitations        PAST SURGICAL HISTORY  No past surgical history on file.    FAMILY HISTORY  Family History   Problem Relation Age of Onset   • Hypertension Mother    • No Known Problems Father    • No Known Problems Sister        SOCIAL HISTORY  Social History     Socioeconomic History   • Marital status:      Spouse name: Not on file   • Number of children: Not on file   • Years of education: Not on file   • Highest education level: Not on file   Tobacco Use   • Smoking status: Never Smoker   • Smokeless tobacco: Never Used   Substance and Sexual Activity   • Alcohol use: Yes   • Drug use: Yes     Frequency: 1.0 times per week     Types: Marijuana   • Sexual activity: Yes     Partners: Female       ALLERGIES  Patient has no known allergies.    The patient's allergies have been reviewed    REVIEW OF SYSTEMS  All systems reviewed and negative except for those discussed in HPI.     PHYSICAL EXAM  I have reviewed the triage vital signs and nursing notes.  ED Triage Vitals [10/19/20 0906]   Temp Heart Rate Resp BP SpO2   97.8 °F (36.6 °C) 70 -- -- 99 %      Temp src Heart Rate Source Patient Position BP Location FiO2 (%)   Tympanic -- -- -- --     General: No acute distress  HENT: NCAT, PERRL, Nares patent  Eyes: no scleral icterus  Neck: trachea midline, no ROM limitations  CV: regular rhythm, regular rate  Respiratory: normal effort  Abdomen: soft, nondistended, nontender to palpation, no rebound tenderness, no guarding or rigidity  : deferred  Musculoskeletal: no deformity  Neuro: Alert and oriented x3, extraocular motion intact, pupils are equal and round reactive to light, cranial nerves II through XII are grossly intact, normal speech, moves all extremities well, 5 out of 5 strength all 4 extremities, sensation intact light touch all 4 extremities, no ataxia.  Skin: warm, dry    LAB RESULTS  No results found for this or any previous  visit (from the past 24 hour(s)).    I ordered the above labs and reviewed the results.    RADIOLOGY  No Radiology Exams Resulted Within Past 24 Hours    I ordered the above noted radiological studies. I reviewed the images and results. I agree with the radiologist interpretation.    PROCEDURES  Procedures    MEDICATIONS GIVEN IN ER  Medications - No data to display    PROGRESS, DATA ANALYSIS, CONSULTS, AND MEDICAL DECISION MAKING  A complete history and physical exam have been performed.  All available laboratory and imaging results have been reviewed by myself prior to disposition.    MDM  After the initial H&P, I discussed pertinent information from history and physical exam with patient/family.  Discussed differential diagnosis.  Discussed plan for ED evaluation/work-up/treatment.  All questions answered.  Patient/family is agreeable with plan.  ED Course as of Oct 19 0923   Mon Oct 19, 2020   0911 Medical history reviewed and significant for: Patient was seen in the emergency department on 27 June for right-sided headache for several days.  Patient reports that he previously had intermittent left-sided headaches.  Patient had normal neuro exam.  Patient had previously been seen in March and had CT imaging at that time which was unremarkable.  Headache was thought likely to be migraine, recommended headache diary and short course of tramadol, follow-up with primary care doctor.  Patient was seen by his primary care physician on 4 August.  Headache had resolved and patient was asymptomatic at that time.  Counseling was given for chronic problems of hypertension hyperlipidemia.    [JG]   0920 She complains of intermittent headaches, most consistent with cluster headaches.  Patient has no alarm signs or symptoms, normal neuro exam, afebrile, vital signs stable.  Symptoms have been ongoing for prolonged period of time.  Given patient has had previous CT head imaging, no further work-up is felt necessary at this  time.  Discussed extensive discussion of return precautions as well as need for follow-up with primary care as well as neurologist.  Discussed possible triggers of cluster headaches and need to avoidance while pending neurology evaluation.  Patient is reassured by discussion, comfortable with plan for discharge with outpatient follow-up, no questions or concerns.    [JG]   0921 The patient was reexamined.  They have had symptomatic improvement during their ED stay.  I discussed today's findings with the patient, explaining the pertinent positives and negatives from today's visit, and the plan of care.  Discussed plan for discharge as there is no emergent indication for admission.  Discussed limitation of the ED work-up and that this is to rule out life-threatening emergencies but that they could require further testing as determined by their primary care and or any referred specialist patient is agreeable and understands need for follow-up and repeat exam/testing.  Patient is aware that discharge does not mean there is nothing wrong, indicates no emergency is present, and that they must continue their care with their primary care physician and/or any referred specialist.  They were given appropriate follow-up with their primary care physician and/or specialist.  I had an extensive discussion on the expected clinical course and return precautions.  Patient understands to return to the emergency department for continuation, worsening, or new symptoms.  I answered any of the patient's questions. Patient was discharged home in a stable condition.        [JG]      ED Course User Index  [JG] Daquan Chatman MD       AS OF 09:23 EDT VITALS:    BP -    HR - 70  TEMP - 97.8 °F (36.6 °C) (Tympanic)  O2 SATS - 99%    DIAGNOSIS  Final diagnoses:   Episodic cluster headache, not intractable         DISPOSITION  DISCHARGE    Patient discharged in stable condition.    Reviewed implications of results, diagnosis, meds,  responsibility to follow up, warning signs and symptoms of possible worsening, potential complications and reasons to return to ER.    Patient/Family voiced understanding of above instructions.    Discussed plan for discharge, as there is no emergent indication for admission. Patient referred to primary care provider for BP management due to today's BP. Pt/family is agreeable and understands need for follow up and repeat testing.  Pt is aware that discharge does not mean that nothing is wrong but it indicates no emergency is present that requires admission and they must continue care with follow-up as given below or physician of their choice.     FOLLOW-UP  Christine Mullins, APRN  [address] [facility] [address] [phone]    Schedule an appointment as soon as possible for a visit in 2 days  even if well    [facility] NEUROLOGY  [address] [address] [phone]  Schedule an appointment as soon as possible for a visit in 2 days  for further evaluation of your headaches         Medication List      No changes were made to your prescriptions during this visit.          Daquan Chatman MD  10/19/20 6670

## 2020-10-20 ENCOUNTER — TELEPHONE (OUTPATIENT)
Dept: INTERNAL MEDICINE | Facility: CLINIC | Age: 45
End: 2020-10-20

## 2020-10-20 NOTE — TELEPHONE ENCOUNTER
Advised patient that Christine will contact him in [facility] to his patient advise request when she is done seeing patients.  Ask the patient to not [facility] any more [facility] and he needs to schedule a appointment to address his concerns.

## 2020-10-20 NOTE — TELEPHONE ENCOUNTER
----- Message from Ana Ruggiero MA sent at 10/20/2020  8:12 AM EDT -----  Regarding: FW: Non-Urgent Medical Question  Contact: [phone]    ----- Message -----  From: Sadi Reyes  Sent: 10/19/2020   5:07 PM EDT  To: Keenan Trotter [facility]  Subject: Non-Urgent Medical Question                      Or can I stay out until after my neurologist appointment?

## 2020-10-22 ENCOUNTER — OFFICE VISIT (OUTPATIENT)
Dept: INTERNAL MEDICINE | Facility: CLINIC | Age: 45
End: 2020-10-22

## 2020-10-22 VITALS
DIASTOLIC BLOOD PRESSURE: 82 MMHG | SYSTOLIC BLOOD PRESSURE: 130 MMHG | RESPIRATION RATE: 14 BRPM | HEIGHT: 71 IN | BODY MASS INDEX: 25.76 KG/M2 | WEIGHT: 184 LBS

## 2020-10-22 DIAGNOSIS — R51.9 INTRACTABLE EPISODIC HEADACHE, UNSPECIFIED HEADACHE TYPE: Primary | ICD-10-CM

## 2020-10-22 PROCEDURE — 99213 OFFICE O/P EST LOW 20 MIN: CPT | Performed by: NURSE PRACTITIONER

## 2020-10-22 RX ORDER — PROPRANOLOL HCL 60 MG
60 CAPSULE, EXTENDED RELEASE 24HR ORAL DAILY
Qty: 30 CAPSULE | Refills: 1 | Status: SHIPPED | OUTPATIENT
Start: 2020-10-22 | End: 2020-11-02 | Stop reason: HOSPADM
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
## 2020-10-28 ENCOUNTER — APPOINTMENT (OUTPATIENT)
Dept: CT IMAGING | Facility: HOSPITAL | Age: 45
End: 2020-10-28

## 2020-10-28 ENCOUNTER — HOSPITAL ENCOUNTER (INPATIENT)
Facility: HOSPITAL | Age: 45
LOS: 5 days | Discharge: HOME | End: 2020-11-02
Attending: EMERGENCY MEDICINE | Admitting: HOSPITALIST

## 2020-10-28 ENCOUNTER — TELEPHONE (OUTPATIENT)
Dept: INTERNAL MEDICINE | Facility: CLINIC | Age: 45
End: 2020-10-28

## 2020-10-28 ENCOUNTER — PATIENT MESSAGE (OUTPATIENT)
Dept: INTERNAL MEDICINE | Facility: CLINIC | Age: 45
End: 2020-10-28

## 2020-10-28 ENCOUNTER — APPOINTMENT (OUTPATIENT)
Dept: GENERAL RADIOLOGY | Facility: HOSPITAL | Age: 45
End: 2020-10-28

## 2020-10-28 DIAGNOSIS — N00.9 ACUTE NEPHRITIS: Primary | ICD-10-CM

## 2020-10-28 DIAGNOSIS — N17.9 ACUTE RENAL FAILURE, UNSPECIFIED ACUTE RENAL FAILURE TYPE (HCC): ICD-10-CM

## 2020-10-28 DIAGNOSIS — R10.9 ACUTE ABDOMINAL PAIN: ICD-10-CM

## 2020-10-28 DIAGNOSIS — R19.7 NAUSEA VOMITING AND DIARRHEA: ICD-10-CM

## 2020-10-28 DIAGNOSIS — R11.2 NAUSEA VOMITING AND DIARRHEA: ICD-10-CM

## 2020-10-28 PROBLEM — N10 ACUTE PYELONEPHRITIS: Status: ACTIVE | Noted: 2020-10-28

## 2020-10-28 PROBLEM — E86.0 DEHYDRATION: Status: ACTIVE | Noted: 2020-10-28

## 2020-10-28 PROBLEM — D72.829 LEUCOCYTOSIS: Status: ACTIVE | Noted: 2020-10-28

## 2020-10-28 LAB
ALBUMIN SERPL-MCNC: 4.6 G/DL (ref 3.5–5.2)
ALBUMIN/GLOB SERPL: 1.8 G/DL
ALP SERPL-CCNC: 55 U/L (ref 39–117)
ALT SERPL W P-5'-P-CCNC: 40 U/L (ref 1–41)
ANION GAP SERPL CALCULATED.3IONS-SCNC: 9.7 MMOL/L (ref 5–15)
AST SERPL-CCNC: 27 U/L (ref 1–40)
B PARAPERT DNA SPEC QL NAA+PROBE: NOT DETECTED
B PERT DNA SPEC QL NAA+PROBE: NOT DETECTED
BACTERIA UR QL AUTO: NORMAL /HPF
BASOPHILS # BLD AUTO: 0.03 10*3/MM3 (ref 0–0.2)
BASOPHILS NFR BLD AUTO: 0.2 % (ref 0–1.5)
BILIRUB SERPL-MCNC: 0.5 MG/DL (ref 0–1.2)
BILIRUB UR QL STRIP: NEGATIVE
BUN SERPL-MCNC: 25 MG/DL (ref 6–20)
BUN/CREAT SERPL: 10.6 (ref 7–25)
C PNEUM DNA NPH QL NAA+NON-PROBE: NOT DETECTED
CALCIUM SPEC-SCNC: 9.5 MG/DL (ref 8.6–10.5)
CHLORIDE SERPL-SCNC: 100 MMOL/L (ref 98–107)
CK SERPL-CCNC: 175 U/L (ref 20–200)
CLARITY UR: CLEAR
CO2 SERPL-SCNC: 28.3 MMOL/L (ref 22–29)
COLOR UR: YELLOW
CREAT SERPL-MCNC: 2.36 MG/DL (ref 0.76–1.27)
D-LACTATE SERPL-SCNC: 1.3 MMOL/L (ref 0.5–2)
DEPRECATED RDW RBC AUTO: 46 FL (ref 37–54)
EOSINOPHIL # BLD AUTO: 0 10*3/MM3 (ref 0–0.4)
EOSINOPHIL NFR BLD AUTO: 0 % (ref 0.3–6.2)
ERYTHROCYTE [DISTWIDTH] IN BLOOD BY AUTOMATED COUNT: 13.2 % (ref 12.3–15.4)
FERRITIN SERPL-MCNC: 272 NG/ML (ref 30–400)
FLUAV SUBTYP SPEC NAA+PROBE: NOT DETECTED
FLUBV RNA ISLT QL NAA+PROBE: NOT DETECTED
GFR SERPL CREATININE-BSD FRML MDRD: 37 ML/MIN/1.73
GLOBULIN UR ELPH-MCNC: 2.6 GM/DL
GLUCOSE SERPL-MCNC: 122 MG/DL (ref 65–99)
GLUCOSE UR STRIP-MCNC: NEGATIVE MG/DL
HADV DNA SPEC NAA+PROBE: NOT DETECTED
HCOV 229E RNA SPEC QL NAA+PROBE: NOT DETECTED
HCOV HKU1 RNA SPEC QL NAA+PROBE: NOT DETECTED
HCOV NL63 RNA SPEC QL NAA+PROBE: NOT DETECTED
HCOV OC43 RNA SPEC QL NAA+PROBE: NOT DETECTED
HCT VFR BLD AUTO: 48.8 % (ref 37.5–51)
HGB BLD-MCNC: 16.4 G/DL (ref 13–17.7)
HGB UR QL STRIP.AUTO: ABNORMAL
HMPV RNA NPH QL NAA+NON-PROBE: NOT DETECTED
HOLD SPECIMEN: NORMAL
HOLD SPECIMEN: NORMAL
HPIV1 RNA SPEC QL NAA+PROBE: NOT DETECTED
HPIV2 RNA SPEC QL NAA+PROBE: NOT DETECTED
HPIV3 RNA NPH QL NAA+PROBE: NOT DETECTED
HPIV4 P GENE NPH QL NAA+PROBE: NOT DETECTED
HYALINE CASTS UR QL AUTO: NORMAL /LPF
IMM GRANULOCYTES # BLD AUTO: 0.03 10*3/MM3 (ref 0–0.05)
IMM GRANULOCYTES NFR BLD AUTO: 0.2 % (ref 0–0.5)
KETONES UR QL STRIP: NEGATIVE
LDH SERPL-CCNC: 195 U/L (ref 135–225)
LEUKOCYTE ESTERASE UR QL STRIP.AUTO: NEGATIVE
LIPASE SERPL-CCNC: 17 U/L (ref 13–60)
LYMPHOCYTES # BLD AUTO: 1.25 10*3/MM3 (ref 0.7–3.1)
LYMPHOCYTES NFR BLD AUTO: 9.6 % (ref 19.6–45.3)
M PNEUMO IGG SER IA-ACNC: NOT DETECTED
MCH RBC QN AUTO: 31.7 PG (ref 26.6–33)
MCHC RBC AUTO-ENTMCNC: 33.6 G/DL (ref 31.5–35.7)
MCV RBC AUTO: 94.4 FL (ref 79–97)
MONOCYTES # BLD AUTO: 0.74 10*3/MM3 (ref 0.1–0.9)
MONOCYTES NFR BLD AUTO: 5.7 % (ref 5–12)
NEUTROPHILS NFR BLD AUTO: 10.91 10*3/MM3 (ref 1.7–7)
NEUTROPHILS NFR BLD AUTO: 84.3 % (ref 42.7–76)
NITRITE UR QL STRIP: NEGATIVE
NRBC BLD AUTO-RTO: 0 /100 WBC (ref 0–0.2)
PH UR STRIP.AUTO: <=5 [PH] (ref 5–8)
PLATELET # BLD AUTO: 212 10*3/MM3 (ref 140–450)
PMV BLD AUTO: 9.9 FL (ref 6–12)
POTASSIUM SERPL-SCNC: 3.9 MMOL/L (ref 3.5–5.2)
PROCALCITONIN SERPL-MCNC: 0.61 NG/ML (ref 0–0.25)
PROT SERPL-MCNC: 7.2 G/DL (ref 6–8.5)
PROT UR QL STRIP: NEGATIVE
QT INTERVAL: 357 MS
RBC # BLD AUTO: 5.17 10*6/MM3 (ref 4.14–5.8)
RBC # UR: NORMAL /HPF
REF LAB TEST METHOD: NORMAL
RHINOVIRUS RNA SPEC NAA+PROBE: NOT DETECTED
RSV RNA NPH QL NAA+NON-PROBE: NOT DETECTED
SARS-COV-2 RNA NPH QL NAA+NON-PROBE: NOT DETECTED
SODIUM SERPL-SCNC: 138 MMOL/L (ref 136–145)
SP GR UR STRIP: 1.01 (ref 1–1.03)
SQUAMOUS #/AREA URNS HPF: NORMAL /HPF
TROPONIN T SERPL-MCNC: <0.01 NG/ML (ref 0–0.03)
UROBILINOGEN UR QL STRIP: ABNORMAL
WBC # BLD AUTO: 12.96 10*3/MM3 (ref 3.4–10.8)
WBC UR QL AUTO: NORMAL /HPF
WHOLE BLOOD HOLD SPECIMEN: NORMAL
WHOLE BLOOD HOLD SPECIMEN: NORMAL

## 2020-10-28 PROCEDURE — 83605 ASSAY OF LACTIC ACID: CPT | Performed by: EMERGENCY MEDICINE

## 2020-10-28 PROCEDURE — 82728 ASSAY OF FERRITIN: CPT | Performed by: EMERGENCY MEDICINE

## 2020-10-28 PROCEDURE — 99285 EMERGENCY DEPT VISIT HI MDM: CPT

## 2020-10-28 PROCEDURE — 36415 COLL VENOUS BLD VENIPUNCTURE: CPT

## 2020-10-28 PROCEDURE — 93005 ELECTROCARDIOGRAM TRACING: CPT | Performed by: EMERGENCY MEDICINE

## 2020-10-28 PROCEDURE — 71045 X-RAY EXAM CHEST 1 VIEW: CPT

## 2020-10-28 PROCEDURE — 81001 URINALYSIS AUTO W/SCOPE: CPT

## 2020-10-28 PROCEDURE — 85025 COMPLETE CBC W/AUTO DIFF WBC: CPT

## 2020-10-28 PROCEDURE — G0378 HOSPITAL OBSERVATION PER HR: HCPCS

## 2020-10-28 PROCEDURE — 74176 CT ABD & PELVIS W/O CONTRAST: CPT

## 2020-10-28 PROCEDURE — 0202U NFCT DS 22 TRGT SARS-COV-2: CPT | Performed by: EMERGENCY MEDICINE

## 2020-10-28 PROCEDURE — 25010000002 ONDANSETRON PER 1 MG: Performed by: EMERGENCY MEDICINE

## 2020-10-28 PROCEDURE — 83615 LACTATE (LD) (LDH) ENZYME: CPT | Performed by: EMERGENCY MEDICINE

## 2020-10-28 PROCEDURE — 83690 ASSAY OF LIPASE: CPT

## 2020-10-28 PROCEDURE — 25010000002 MORPHINE PER 10 MG: Performed by: EMERGENCY MEDICINE

## 2020-10-28 PROCEDURE — 87040 BLOOD CULTURE FOR BACTERIA: CPT | Performed by: INTERNAL MEDICINE

## 2020-10-28 PROCEDURE — 84145 PROCALCITONIN (PCT): CPT | Performed by: EMERGENCY MEDICINE

## 2020-10-28 PROCEDURE — 82550 ASSAY OF CK (CPK): CPT | Performed by: EMERGENCY MEDICINE

## 2020-10-28 PROCEDURE — 93010 ELECTROCARDIOGRAM REPORT: CPT | Performed by: INTERNAL MEDICINE

## 2020-10-28 PROCEDURE — 84484 ASSAY OF TROPONIN QUANT: CPT | Performed by: EMERGENCY MEDICINE

## 2020-10-28 PROCEDURE — 80053 COMPREHEN METABOLIC PANEL: CPT

## 2020-10-28 PROCEDURE — 87086 URINE CULTURE/COLONY COUNT: CPT | Performed by: INTERNAL MEDICINE

## 2020-10-28 PROCEDURE — 25010000002 CEFTRIAXONE PER 250 MG: Performed by: INTERNAL MEDICINE

## 2020-10-28 PROCEDURE — 93005 ELECTROCARDIOGRAM TRACING: CPT

## 2020-10-28 RX ORDER — AMLODIPINE BESYLATE 5 MG/1
5 TABLET ORAL DAILY
Status: DISCONTINUED | OUTPATIENT
Start: 2020-10-29 | End: 2020-10-31

## 2020-10-28 RX ORDER — SODIUM CHLORIDE 9 MG/ML
100 INJECTION, SOLUTION INTRAVENOUS CONTINUOUS
Status: DISCONTINUED | OUTPATIENT
Start: 2020-10-28 | End: 2020-10-29

## 2020-10-28 RX ORDER — CEFTRIAXONE SODIUM 1 G/50ML
1 INJECTION, SOLUTION INTRAVENOUS EVERY 24 HOURS
Status: COMPLETED | OUTPATIENT
Start: 2020-10-28 | End: 2020-11-01

## 2020-10-28 RX ORDER — MORPHINE SULFATE 2 MG/ML
4 INJECTION, SOLUTION INTRAMUSCULAR; INTRAVENOUS ONCE
Status: COMPLETED | OUTPATIENT
Start: 2020-10-28 | End: 2020-10-28

## 2020-10-28 RX ORDER — ACETAMINOPHEN 160 MG/5ML
650 SOLUTION ORAL EVERY 4 HOURS PRN
Status: DISCONTINUED | OUTPATIENT
Start: 2020-10-28 | End: 2020-11-02 | Stop reason: HOSPADM

## 2020-10-28 RX ORDER — ATORVASTATIN CALCIUM 20 MG/1
20 TABLET, FILM COATED ORAL DAILY
Status: DISCONTINUED | OUTPATIENT
Start: 2020-10-29 | End: 2020-11-02 | Stop reason: HOSPADM

## 2020-10-28 RX ORDER — SODIUM CHLORIDE 0.9 % (FLUSH) 0.9 %
10 SYRINGE (ML) INJECTION EVERY 12 HOURS SCHEDULED
Status: DISCONTINUED | OUTPATIENT
Start: 2020-10-28 | End: 2020-10-29

## 2020-10-28 RX ORDER — ATORVASTATIN CALCIUM 20 MG/1
20 TABLET, FILM COATED ORAL DAILY
COMMUNITY
End: 2021-03-15

## 2020-10-28 RX ORDER — ONDANSETRON 4 MG/1
4 TABLET, FILM COATED ORAL EVERY 6 HOURS PRN
Status: DISCONTINUED | OUTPATIENT
Start: 2020-10-28 | End: 2020-11-02 | Stop reason: HOSPADM

## 2020-10-28 RX ORDER — ACETAMINOPHEN 650 MG/1
650 SUPPOSITORY RECTAL EVERY 4 HOURS PRN
Status: DISCONTINUED | OUTPATIENT
Start: 2020-10-28 | End: 2020-11-02 | Stop reason: HOSPADM

## 2020-10-28 RX ORDER — AMLODIPINE BESYLATE 5 MG/1
5 TABLET ORAL DAILY
COMMUNITY
End: 2020-11-02 | Stop reason: HOSPADM

## 2020-10-28 RX ORDER — SODIUM CHLORIDE 0.9 % (FLUSH) 0.9 %
10 SYRINGE (ML) INJECTION AS NEEDED
Status: DISCONTINUED | OUTPATIENT
Start: 2020-10-28 | End: 2020-11-01

## 2020-10-28 RX ORDER — SODIUM CHLORIDE 9 MG/ML
125 INJECTION, SOLUTION INTRAVENOUS CONTINUOUS
Status: DISCONTINUED | OUTPATIENT
Start: 2020-10-28 | End: 2020-10-31

## 2020-10-28 RX ORDER — ACETAMINOPHEN 325 MG/1
650 TABLET ORAL EVERY 4 HOURS PRN
Status: DISCONTINUED | OUTPATIENT
Start: 2020-10-28 | End: 2020-11-02 | Stop reason: HOSPADM

## 2020-10-28 RX ORDER — ONDANSETRON 2 MG/ML
4 INJECTION INTRAMUSCULAR; INTRAVENOUS ONCE
Status: COMPLETED | OUTPATIENT
Start: 2020-10-28 | End: 2020-10-28

## 2020-10-28 RX ORDER — SODIUM CHLORIDE 0.9 % (FLUSH) 0.9 %
10 SYRINGE (ML) INJECTION AS NEEDED
Status: DISCONTINUED | OUTPATIENT
Start: 2020-10-28 | End: 2020-11-02 | Stop reason: HOSPADM

## 2020-10-28 RX ORDER — PROPRANOLOL HCL 60 MG
60 CAPSULE, EXTENDED RELEASE 24HR ORAL DAILY
Status: DISCONTINUED | OUTPATIENT
Start: 2020-10-29 | End: 2020-11-02

## 2020-10-28 RX ORDER — ONDANSETRON 2 MG/ML
4 INJECTION INTRAMUSCULAR; INTRAVENOUS EVERY 6 HOURS PRN
Status: DISCONTINUED | OUTPATIENT
Start: 2020-10-28 | End: 2020-11-02 | Stop reason: HOSPADM

## 2020-10-28 RX ORDER — TRIAMTERENE AND HYDROCHLOROTHIAZIDE 37.5; 25 MG/1; MG/1
1 TABLET ORAL DAILY
COMMUNITY
End: 2020-11-02 | Stop reason: HOSPADM

## 2020-10-28 RX ADMIN — CEFTRIAXONE SODIUM 1 G: 1 INJECTION, SOLUTION INTRAVENOUS at 22:34

## 2020-10-28 RX ADMIN — SODIUM CHLORIDE 100 ML/HR: 9 INJECTION, SOLUTION INTRAVENOUS at 22:35

## 2020-10-28 RX ADMIN — SODIUM CHLORIDE 1000 ML: 9 INJECTION, SOLUTION INTRAVENOUS at 18:10

## 2020-10-28 RX ADMIN — ONDANSETRON 4 MG: 2 INJECTION INTRAMUSCULAR; INTRAVENOUS at 16:17

## 2020-10-28 RX ADMIN — MORPHINE SULFATE 4 MG: 2 INJECTION, SOLUTION INTRAMUSCULAR; INTRAVENOUS at 16:17

## 2020-10-28 RX ADMIN — MORPHINE SULFATE 4 MG: 2 INJECTION, SOLUTION INTRAMUSCULAR; INTRAVENOUS at 19:45

## 2020-10-28 RX ADMIN — SODIUM CHLORIDE 125 ML/HR: 9 INJECTION, SOLUTION INTRAVENOUS at 18:10

## 2020-10-28 NOTE — TELEPHONE ENCOUNTER
From: Sadi Reyes  To: CHARI [place]  Sent: 10/28/2020 1:05 PM EDT  Subject: Complaint    I think I need a COVID test, I'm feeling like I'm having symptoms
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
## 2020-10-29 ENCOUNTER — APPOINTMENT (OUTPATIENT)
Dept: ULTRASOUND IMAGING | Facility: HOSPITAL | Age: 45
End: 2020-10-29

## 2020-10-29 LAB
ALBUMIN SERPL-MCNC: 3.8 G/DL (ref 3.5–5.2)
ALBUMIN/GLOB SERPL: 1.6 G/DL
ALP SERPL-CCNC: 44 U/L (ref 39–117)
ALT SERPL W P-5'-P-CCNC: 25 U/L (ref 1–41)
ANION GAP SERPL CALCULATED.3IONS-SCNC: 12 MMOL/L (ref 5–15)
AST SERPL-CCNC: 20 U/L (ref 1–40)
BASOPHILS # BLD AUTO: 0.03 10*3/MM3 (ref 0–0.2)
BASOPHILS NFR BLD AUTO: 0.3 % (ref 0–1.5)
BILIRUB SERPL-MCNC: 0.3 MG/DL (ref 0–1.2)
BUN SERPL-MCNC: 21 MG/DL (ref 6–20)
BUN/CREAT SERPL: 9.6 (ref 7–25)
CALCIUM SPEC-SCNC: 8.7 MG/DL (ref 8.6–10.5)
CHLORIDE SERPL-SCNC: 103 MMOL/L (ref 98–107)
CO2 SERPL-SCNC: 25 MMOL/L (ref 22–29)
CREAT SERPL-MCNC: 2.19 MG/DL (ref 0.76–1.27)
DEPRECATED RDW RBC AUTO: 43.1 FL (ref 37–54)
EOSINOPHIL # BLD AUTO: 0.01 10*3/MM3 (ref 0–0.4)
EOSINOPHIL NFR BLD AUTO: 0.1 % (ref 0.3–6.2)
ERYTHROCYTE [DISTWIDTH] IN BLOOD BY AUTOMATED COUNT: 13 % (ref 12.3–15.4)
GFR SERPL CREATININE-BSD FRML MDRD: 40 ML/MIN/1.73
GLOBULIN UR ELPH-MCNC: 2.4 GM/DL
GLUCOSE SERPL-MCNC: 94 MG/DL (ref 65–99)
HCT VFR BLD AUTO: 42.8 % (ref 37.5–51)
HGB BLD-MCNC: 14.8 G/DL (ref 13–17.7)
IMM GRANULOCYTES # BLD AUTO: 0.03 10*3/MM3 (ref 0–0.05)
IMM GRANULOCYTES NFR BLD AUTO: 0.3 % (ref 0–0.5)
LYMPHOCYTES # BLD AUTO: 1.74 10*3/MM3 (ref 0.7–3.1)
LYMPHOCYTES NFR BLD AUTO: 16.6 % (ref 19.6–45.3)
MCH RBC QN AUTO: 31.3 PG (ref 26.6–33)
MCHC RBC AUTO-ENTMCNC: 34.6 G/DL (ref 31.5–35.7)
MCV RBC AUTO: 90.5 FL (ref 79–97)
MONOCYTES # BLD AUTO: 0.95 10*3/MM3 (ref 0.1–0.9)
MONOCYTES NFR BLD AUTO: 9 % (ref 5–12)
NEUTROPHILS NFR BLD AUTO: 7.74 10*3/MM3 (ref 1.7–7)
NEUTROPHILS NFR BLD AUTO: 73.7 % (ref 42.7–76)
NRBC BLD AUTO-RTO: 0 /100 WBC (ref 0–0.2)
PLATELET # BLD AUTO: 196 10*3/MM3 (ref 140–450)
PMV BLD AUTO: 10.1 FL (ref 6–12)
POTASSIUM SERPL-SCNC: 3.6 MMOL/L (ref 3.5–5.2)
PROT SERPL-MCNC: 6.2 G/DL (ref 6–8.5)
RBC # BLD AUTO: 4.73 10*6/MM3 (ref 4.14–5.8)
SODIUM SERPL-SCNC: 140 MMOL/L (ref 136–145)
WBC # BLD AUTO: 10.5 10*3/MM3 (ref 3.4–10.8)

## 2020-10-29 PROCEDURE — 85025 COMPLETE CBC W/AUTO DIFF WBC: CPT | Performed by: INTERNAL MEDICINE

## 2020-10-29 PROCEDURE — 86160 COMPLEMENT ANTIGEN: CPT | Performed by: INTERNAL MEDICINE

## 2020-10-29 PROCEDURE — 80053 COMPREHEN METABOLIC PANEL: CPT | Performed by: INTERNAL MEDICINE

## 2020-10-29 PROCEDURE — 25010000002 CEFTRIAXONE PER 250 MG: Performed by: INTERNAL MEDICINE

## 2020-10-29 PROCEDURE — 86038 ANTINUCLEAR ANTIBODIES: CPT | Performed by: INTERNAL MEDICINE

## 2020-10-29 PROCEDURE — G0378 HOSPITAL OBSERVATION PER HR: HCPCS

## 2020-10-29 PROCEDURE — 76775 US EXAM ABDO BACK WALL LIM: CPT

## 2020-10-29 PROCEDURE — 86431 RHEUMATOID FACTOR QUANT: CPT | Performed by: INTERNAL MEDICINE

## 2020-10-29 PROCEDURE — 36415 COLL VENOUS BLD VENIPUNCTURE: CPT | Performed by: INTERNAL MEDICINE

## 2020-10-29 RX ORDER — HYDROCODONE BITARTRATE AND ACETAMINOPHEN 5; 325 MG/1; MG/1
1 TABLET ORAL EVERY 6 HOURS PRN
Status: DISCONTINUED | OUTPATIENT
Start: 2020-10-29 | End: 2020-11-02 | Stop reason: HOSPADM

## 2020-10-29 RX ORDER — PANTOPRAZOLE SODIUM 40 MG/1
40 TABLET, DELAYED RELEASE ORAL
Status: DISCONTINUED | OUTPATIENT
Start: 2020-10-29 | End: 2020-11-02 | Stop reason: HOSPADM

## 2020-10-29 RX ADMIN — HYDROCODONE BITARTRATE AND ACETAMINOPHEN 1 TABLET: 5; 325 TABLET ORAL at 09:24

## 2020-10-29 RX ADMIN — PANTOPRAZOLE SODIUM 40 MG: 40 TABLET, DELAYED RELEASE ORAL at 17:41

## 2020-10-29 RX ADMIN — HYDROCODONE BITARTRATE AND ACETAMINOPHEN 1 TABLET: 5; 325 TABLET ORAL at 01:05

## 2020-10-29 RX ADMIN — SODIUM CHLORIDE 125 ML/HR: 9 INJECTION, SOLUTION INTRAVENOUS at 16:58

## 2020-10-29 RX ADMIN — AMLODIPINE BESYLATE 5 MG: 5 TABLET ORAL at 09:24

## 2020-10-29 RX ADMIN — CEFTRIAXONE SODIUM 1 G: 1 INJECTION, SOLUTION INTRAVENOUS at 20:30

## 2020-10-30 ENCOUNTER — APPOINTMENT (OUTPATIENT)
Dept: CARDIOLOGY | Facility: HOSPITAL | Age: 45
End: 2020-10-30

## 2020-10-30 ENCOUNTER — APPOINTMENT (OUTPATIENT)
Dept: GENERAL RADIOLOGY | Facility: HOSPITAL | Age: 45
End: 2020-10-30

## 2020-10-30 ENCOUNTER — APPOINTMENT (OUTPATIENT)
Dept: CT IMAGING | Facility: HOSPITAL | Age: 45
End: 2020-10-30

## 2020-10-30 LAB
ANA SER QL: NEGATIVE
ANION GAP SERPL CALCULATED.3IONS-SCNC: 9.9 MMOL/L (ref 5–15)
AORTIC ARCH: 2.6 CM
AORTIC DIMENSIONLESS INDEX: 0.7 (DI)
ASCENDING AORTA: 2.9 CM
BACTERIA SPEC AEROBE CULT: NO GROWTH
BH CV ECHO MEAS - ACS: 2.1 CM
BH CV ECHO MEAS - AO ARCH DIAM (PROXIMAL TRANS.): 2.7 CM
BH CV ECHO MEAS - AO MAX PG (FULL): 1.8 MMHG
BH CV ECHO MEAS - AO MAX PG: 6.3 MMHG
BH CV ECHO MEAS - AO MEAN PG (FULL): 2 MMHG
BH CV ECHO MEAS - AO MEAN PG: 4 MMHG
BH CV ECHO MEAS - AO ROOT AREA (BSA CORRECTED): 1.4
BH CV ECHO MEAS - AO ROOT AREA: 6.2 CM^2
BH CV ECHO MEAS - AO ROOT DIAM: 2.8 CM
BH CV ECHO MEAS - AO V2 MAX: 125 CM/SEC
BH CV ECHO MEAS - AO V2 MEAN: 95 CM/SEC
BH CV ECHO MEAS - AO V2 VTI: 27.7 CM
BH CV ECHO MEAS - ASC AORTA: 2.9 CM
BH CV ECHO MEAS - AVA(I,A): 2.6 CM^2
BH CV ECHO MEAS - AVA(I,D): 2.6 CM^2
BH CV ECHO MEAS - AVA(V,A): 2.9 CM^2
BH CV ECHO MEAS - AVA(V,D): 2.9 CM^2
BH CV ECHO MEAS - BSA(HAYCOCK): 2 M^2
BH CV ECHO MEAS - BSA: 2 M^2
BH CV ECHO MEAS - BZI_BMI: 25.6 KILOGRAMS/M^2
BH CV ECHO MEAS - BZI_METRIC_HEIGHT: 180 CM
BH CV ECHO MEAS - BZI_METRIC_WEIGHT: 83 KG
BH CV ECHO MEAS - EDV(CUBED): 97.3 ML
BH CV ECHO MEAS - EDV(MOD-SP4): 137 ML
BH CV ECHO MEAS - EDV(TEICH): 97.3 ML
BH CV ECHO MEAS - EF(CUBED): 72.3 %
BH CV ECHO MEAS - EF(MOD-BP): 61 %
BH CV ECHO MEAS - EF(MOD-SP4): 60.6 %
BH CV ECHO MEAS - EF(TEICH): 64 %
BH CV ECHO MEAS - ESV(CUBED): 27 ML
BH CV ECHO MEAS - ESV(MOD-SP4): 54 ML
BH CV ECHO MEAS - ESV(TEICH): 35 ML
BH CV ECHO MEAS - FS: 34.8 %
BH CV ECHO MEAS - IVS/LVPW: 1.1
BH CV ECHO MEAS - IVSD: 1 CM
BH CV ECHO MEAS - LA DIMENSION: 2.7 CM
BH CV ECHO MEAS - LA/AO: 0.96
BH CV ECHO MEAS - LAT PEAK E' VEL: 15.8 CM/SEC
BH CV ECHO MEAS - LV DIASTOLIC VOL/BSA (35-75): 67.6 ML/M^2
BH CV ECHO MEAS - LV MASS(C)D: 148.1 GRAMS
BH CV ECHO MEAS - LV MASS(C)DI: 73 GRAMS/M^2
BH CV ECHO MEAS - LV MAX PG: 4.5 MMHG
BH CV ECHO MEAS - LV MEAN PG: 2 MMHG
BH CV ECHO MEAS - LV SYSTOLIC VOL/BSA (12-30): 26.6 ML/M^2
BH CV ECHO MEAS - LV V1 MAX: 106 CM/SEC
BH CV ECHO MEAS - LV V1 MEAN: 61.7 CM/SEC
BH CV ECHO MEAS - LV V1 VTI: 20.6 CM
BH CV ECHO MEAS - LVIDD: 4.6 CM
BH CV ECHO MEAS - LVIDS: 3 CM
BH CV ECHO MEAS - LVLD AP4: 9.4 CM
BH CV ECHO MEAS - LVLS AP4: 9.2 CM
BH CV ECHO MEAS - LVOT AREA (M): 3.5 CM^2
BH CV ECHO MEAS - LVOT AREA: 3.5 CM^2
BH CV ECHO MEAS - LVOT DIAM: 2.1 CM
BH CV ECHO MEAS - LVPWD: 0.9 CM
BH CV ECHO MEAS - MED PEAK E' VEL: 10.9 CM/SEC
BH CV ECHO MEAS - MV A DUR: 0.16 SEC
BH CV ECHO MEAS - MV A MAX VEL: 35.5 CM/SEC
BH CV ECHO MEAS - MV DEC SLOPE: 623 CM/SEC^2
BH CV ECHO MEAS - MV DEC TIME: 0.19 SEC
BH CV ECHO MEAS - MV E MAX VEL: 95.8 CM/SEC
BH CV ECHO MEAS - MV E/A: 2.7
BH CV ECHO MEAS - MV MEAN PG: 1 MMHG
BH CV ECHO MEAS - MV P1/2T MAX VEL: 94.7 CM/SEC
BH CV ECHO MEAS - MV P1/2T: 44.5 MSEC
BH CV ECHO MEAS - MV V2 MEAN: 46.7 CM/SEC
BH CV ECHO MEAS - MV V2 VTI: 30.4 CM
BH CV ECHO MEAS - MVA P1/2T LCG: 2.3 CM^2
BH CV ECHO MEAS - MVA(P1/2T): 4.9 CM^2
BH CV ECHO MEAS - MVA(VTI): 2.3 CM^2
BH CV ECHO MEAS - PA ACC SLOPE: 19.7 CM/SEC^2
BH CV ECHO MEAS - PA ACC TIME: 0.1 SEC
BH CV ECHO MEAS - PA MAX PG: 3.3 MMHG
BH CV ECHO MEAS - PA PR(ACCEL): 34.5 MMHG
BH CV ECHO MEAS - PA V2 MAX: 90.2 CM/SEC
BH CV ECHO MEAS - PI END-D VEL: 91.1 CM/SEC
BH CV ECHO MEAS - PULM A REVS DUR: 0.12 SEC
BH CV ECHO MEAS - PULM A REVS VEL: 23.4 CM/SEC
BH CV ECHO MEAS - PULM DIAS VEL: 56.7 CM/SEC
BH CV ECHO MEAS - PULM S/D: 1.2
BH CV ECHO MEAS - PULM SYS VEL: 69.3 CM/SEC
BH CV ECHO MEAS - QP/QS: 1.5
BH CV ECHO MEAS - RV MEAN PG: 1 MMHG
BH CV ECHO MEAS - RV V1 MEAN: 50.7 CM/SEC
BH CV ECHO MEAS - RV V1 VTI: 16.6 CM
BH CV ECHO MEAS - RVOT AREA: 6.6 CM^2
BH CV ECHO MEAS - RVOT DIAM: 2.9 CM
BH CV ECHO MEAS - SI(AO): 84.1 ML/M^2
BH CV ECHO MEAS - SI(CUBED): 34.7 ML/M^2
BH CV ECHO MEAS - SI(LVOT): 35.2 ML/M^2
BH CV ECHO MEAS - SI(MOD-SP4): 40.9 ML/M^2
BH CV ECHO MEAS - SI(TEICH): 30.7 ML/M^2
BH CV ECHO MEAS - SUP REN AO DIAM: 2.2 CM
BH CV ECHO MEAS - SV(AO): 170.6 ML
BH CV ECHO MEAS - SV(CUBED): 70.3 ML
BH CV ECHO MEAS - SV(LVOT): 71.4 ML
BH CV ECHO MEAS - SV(MOD-SP4): 83 ML
BH CV ECHO MEAS - SV(RVOT): 109.6 ML
BH CV ECHO MEAS - SV(TEICH): 62.3 ML
BH CV ECHO MEAS - TAPSE (>1.6): 2.5 CM
BH CV ECHO MEASUREMENTS AVERAGE E/E' RATIO: 7.18
BH CV XLRA - RV BASE: 4 CM
BH CV XLRA - RV LENGTH: 6 CM
BH CV XLRA - RV MID: 3.4 CM
BH CV XLRA - TDI S': 15.4 CM/SEC
BUN SERPL-MCNC: 12 MG/DL (ref 6–20)
BUN/CREAT SERPL: 8.5 (ref 7–25)
C3 SERPL-MCNC: 129 MG/DL (ref 82–167)
C4 SERPL-MCNC: 18 MG/DL (ref 12–38)
CALCIUM SPEC-SCNC: 8.7 MG/DL (ref 8.6–10.5)
CHLORIDE SERPL-SCNC: 104 MMOL/L (ref 98–107)
CO2 SERPL-SCNC: 26.1 MMOL/L (ref 22–29)
CREAT SERPL-MCNC: 1.41 MG/DL (ref 0.76–1.27)
GFR SERPL CREATININE-BSD FRML MDRD: 66 ML/MIN/1.73
GLUCOSE SERPL-MCNC: 84 MG/DL (ref 65–99)
LEFT ATRIUM VOLUME INDEX: 47 ML/M2
LV EF 2D ECHO EST: 60 %
MAXIMAL PREDICTED HEART RATE: 177 BPM
POTASSIUM SERPL-SCNC: 3.4 MMOL/L (ref 3.5–5.2)
RHEUMATOID FACT SERPL-ACNC: 11.6 IU/ML (ref 0–13.9)
SINUS: 2.8 CM
SODIUM SERPL-SCNC: 140 MMOL/L (ref 136–145)
STJ: 2.9 CM
STRESS TARGET HR: 150 BPM
TROPONIN T SERPL-MCNC: <0.01 NG/ML (ref 0–0.03)

## 2020-10-30 PROCEDURE — 99253 IP/OBS CNSLTJ NEW/EST LOW 45: CPT | Performed by: PSYCHIATRY & NEUROLOGY

## 2020-10-30 PROCEDURE — 99254 IP/OBS CNSLTJ NEW/EST MOD 60: CPT | Performed by: INTERNAL MEDICINE

## 2020-10-30 PROCEDURE — 72040 X-RAY EXAM NECK SPINE 2-3 VW: CPT

## 2020-10-30 PROCEDURE — 93306 TTE W/DOPPLER COMPLETE: CPT | Performed by: INTERNAL MEDICINE

## 2020-10-30 PROCEDURE — 25010000002 CEFTRIAXONE PER 250 MG: Performed by: INTERNAL MEDICINE

## 2020-10-30 PROCEDURE — 93005 ELECTROCARDIOGRAM TRACING: CPT | Performed by: INTERNAL MEDICINE

## 2020-10-30 PROCEDURE — 84484 ASSAY OF TROPONIN QUANT: CPT | Performed by: INTERNAL MEDICINE

## 2020-10-30 PROCEDURE — 93306 TTE W/DOPPLER COMPLETE: CPT

## 2020-10-30 PROCEDURE — 93010 ELECTROCARDIOGRAM REPORT: CPT | Performed by: INTERNAL MEDICINE

## 2020-10-30 PROCEDURE — 80048 BASIC METABOLIC PNL TOTAL CA: CPT | Performed by: INTERNAL MEDICINE

## 2020-10-30 PROCEDURE — 25010000002 PERFLUTREN (DEFINITY) 8.476 MG IN SODIUM CHLORIDE (PF) 0.9 % 10 ML INJECTION: Performed by: HOSPITALIST

## 2020-10-30 PROCEDURE — 70450 CT HEAD/BRAIN W/O DYE: CPT

## 2020-10-30 RX ORDER — ALUMINA, MAGNESIA, AND SIMETHICONE 2400; 2400; 240 MG/30ML; MG/30ML; MG/30ML
15 SUSPENSION ORAL EVERY 6 HOURS PRN
Status: DISCONTINUED | OUTPATIENT
Start: 2020-10-30 | End: 2020-11-02 | Stop reason: HOSPADM

## 2020-10-30 RX ADMIN — PANTOPRAZOLE SODIUM 40 MG: 40 TABLET, DELAYED RELEASE ORAL at 16:33

## 2020-10-30 RX ADMIN — ALUMINUM HYDROXIDE, MAGNESIUM HYDROXIDE, AND DIMETHICONE 15 ML: 400; 400; 40 SUSPENSION ORAL at 15:17

## 2020-10-30 RX ADMIN — ATORVASTATIN CALCIUM 20 MG: 20 TABLET, FILM COATED ORAL at 08:49

## 2020-10-30 RX ADMIN — CEFTRIAXONE SODIUM 1 G: 1 INJECTION, SOLUTION INTRAVENOUS at 20:39

## 2020-10-30 RX ADMIN — SODIUM CHLORIDE 125 ML/HR: 9 INJECTION, SOLUTION INTRAVENOUS at 20:39

## 2020-10-30 RX ADMIN — HYDROCODONE BITARTRATE AND ACETAMINOPHEN 1 TABLET: 5; 325 TABLET ORAL at 12:32

## 2020-10-30 RX ADMIN — SODIUM CHLORIDE 125 ML/HR: 9 INJECTION, SOLUTION INTRAVENOUS at 12:32

## 2020-10-30 RX ADMIN — PERFLUTREN 2 ML: 6.52 INJECTION, SUSPENSION INTRAVENOUS at 13:30

## 2020-10-30 RX ADMIN — ALUMINUM HYDROXIDE, MAGNESIUM HYDROXIDE, AND DIMETHICONE 15 ML: 400; 400; 40 SUSPENSION ORAL at 20:47

## 2020-10-30 RX ADMIN — PANTOPRAZOLE SODIUM 40 MG: 40 TABLET, DELAYED RELEASE ORAL at 06:43

## 2020-10-30 RX ADMIN — AMLODIPINE BESYLATE 5 MG: 5 TABLET ORAL at 08:49

## 2020-10-31 ENCOUNTER — APPOINTMENT (OUTPATIENT)
Dept: ULTRASOUND IMAGING | Facility: HOSPITAL | Age: 45
End: 2020-10-31

## 2020-10-31 LAB
ANION GAP SERPL CALCULATED.3IONS-SCNC: 7.4 MMOL/L (ref 5–15)
BUN SERPL-MCNC: 10 MG/DL (ref 6–20)
BUN/CREAT SERPL: 8.3 (ref 7–25)
CALCIUM SPEC-SCNC: 8.6 MG/DL (ref 8.6–10.5)
CHLORIDE SERPL-SCNC: 104 MMOL/L (ref 98–107)
CO2 SERPL-SCNC: 27.6 MMOL/L (ref 22–29)
CREAT SERPL-MCNC: 1.2 MG/DL (ref 0.76–1.27)
DEPRECATED RDW RBC AUTO: 42.2 FL (ref 37–54)
ERYTHROCYTE [DISTWIDTH] IN BLOOD BY AUTOMATED COUNT: 12.7 % (ref 12.3–15.4)
GFR SERPL CREATININE-BSD FRML MDRD: 80 ML/MIN/1.73
GLUCOSE SERPL-MCNC: 81 MG/DL (ref 65–99)
HCT VFR BLD AUTO: 40.1 % (ref 37.5–51)
HGB BLD-MCNC: 13.4 G/DL (ref 13–17.7)
MCH RBC QN AUTO: 30.7 PG (ref 26.6–33)
MCHC RBC AUTO-ENTMCNC: 33.4 G/DL (ref 31.5–35.7)
MCV RBC AUTO: 91.8 FL (ref 79–97)
PLATELET # BLD AUTO: 190 10*3/MM3 (ref 140–450)
PMV BLD AUTO: 10.4 FL (ref 6–12)
POTASSIUM SERPL-SCNC: 3.6 MMOL/L (ref 3.5–5.2)
RBC # BLD AUTO: 4.37 10*6/MM3 (ref 4.14–5.8)
SODIUM SERPL-SCNC: 139 MMOL/L (ref 136–145)
WBC # BLD AUTO: 7.21 10*3/MM3 (ref 3.4–10.8)

## 2020-10-31 PROCEDURE — 99254 IP/OBS CNSLTJ NEW/EST MOD 60: CPT | Performed by: INTERNAL MEDICINE

## 2020-10-31 PROCEDURE — 99232 SBSQ HOSP IP/OBS MODERATE 35: CPT | Performed by: INTERNAL MEDICINE

## 2020-10-31 PROCEDURE — 25010000002 CEFTRIAXONE PER 250 MG: Performed by: INTERNAL MEDICINE

## 2020-10-31 PROCEDURE — 76705 ECHO EXAM OF ABDOMEN: CPT

## 2020-10-31 PROCEDURE — 85027 COMPLETE CBC AUTOMATED: CPT | Performed by: HOSPITALIST

## 2020-10-31 PROCEDURE — 80048 BASIC METABOLIC PNL TOTAL CA: CPT | Performed by: HOSPITALIST

## 2020-10-31 RX ORDER — AMLODIPINE BESYLATE 10 MG/1
10 TABLET ORAL DAILY
Status: DISCONTINUED | OUTPATIENT
Start: 2020-10-31 | End: 2020-11-02 | Stop reason: HOSPADM

## 2020-10-31 RX ORDER — SUCRALFATE 1 G/1
1 TABLET ORAL
Status: DISCONTINUED | OUTPATIENT
Start: 2020-10-31 | End: 2020-11-02

## 2020-10-31 RX ADMIN — SUCRALFATE 1 G: 1 TABLET ORAL at 20:02

## 2020-10-31 RX ADMIN — CEFTRIAXONE SODIUM 1 G: 1 INJECTION, SOLUTION INTRAVENOUS at 19:53

## 2020-10-31 RX ADMIN — ALUMINUM HYDROXIDE, MAGNESIUM HYDROXIDE, AND DIMETHICONE 15 ML: 400; 400; 40 SUSPENSION ORAL at 19:51

## 2020-10-31 RX ADMIN — PANTOPRAZOLE SODIUM 40 MG: 40 TABLET, DELAYED RELEASE ORAL at 17:09

## 2020-10-31 RX ADMIN — PANTOPRAZOLE SODIUM 40 MG: 40 TABLET, DELAYED RELEASE ORAL at 06:26

## 2020-10-31 RX ADMIN — PROPRANOLOL HYDROCHLORIDE 60 MG: 60 CAPSULE, EXTENDED RELEASE ORAL at 08:34

## 2020-10-31 RX ADMIN — SUCRALFATE 1 G: 1 TABLET ORAL at 17:09

## 2020-10-31 RX ADMIN — AMLODIPINE BESYLATE 10 MG: 10 TABLET ORAL at 08:34

## 2020-10-31 RX ADMIN — ATORVASTATIN CALCIUM 20 MG: 20 TABLET, FILM COATED ORAL at 08:34

## 2020-11-01 PROBLEM — R10.9 ACUTE ABDOMINAL PAIN: Status: ACTIVE | Noted: 2020-10-28

## 2020-11-01 PROBLEM — R19.7 NAUSEA VOMITING AND DIARRHEA: Status: ACTIVE | Noted: 2020-10-28

## 2020-11-01 PROBLEM — R11.2 NAUSEA VOMITING AND DIARRHEA: Status: ACTIVE | Noted: 2020-10-28

## 2020-11-01 LAB
ALBUMIN SERPL-MCNC: 3.9 G/DL (ref 3.5–5.2)
ALBUMIN/GLOB SERPL: 1.4 G/DL
ALP SERPL-CCNC: 44 U/L (ref 39–117)
ALT SERPL W P-5'-P-CCNC: 25 U/L (ref 1–41)
ANION GAP SERPL CALCULATED.3IONS-SCNC: 7.3 MMOL/L (ref 5–15)
AST SERPL-CCNC: 15 U/L (ref 1–40)
BILIRUB SERPL-MCNC: 0.4 MG/DL (ref 0–1.2)
BUN SERPL-MCNC: 10 MG/DL (ref 6–20)
BUN/CREAT SERPL: 8.5 (ref 7–25)
CALCIUM SPEC-SCNC: 9.3 MG/DL (ref 8.6–10.5)
CHLORIDE SERPL-SCNC: 103 MMOL/L (ref 98–107)
CO2 SERPL-SCNC: 28.7 MMOL/L (ref 22–29)
CREAT SERPL-MCNC: 1.17 MG/DL (ref 0.76–1.27)
GFR SERPL CREATININE-BSD FRML MDRD: 82 ML/MIN/1.73
GLOBULIN UR ELPH-MCNC: 2.7 GM/DL
GLUCOSE SERPL-MCNC: 91 MG/DL (ref 65–99)
LIPASE SERPL-CCNC: 37 U/L (ref 13–60)
POTASSIUM SERPL-SCNC: 3.9 MMOL/L (ref 3.5–5.2)
PROT SERPL-MCNC: 6.6 G/DL (ref 6–8.5)
QT INTERVAL: 418 MS
SODIUM SERPL-SCNC: 139 MMOL/L (ref 136–145)
TROPONIN T SERPL-MCNC: <0.01 NG/ML (ref 0–0.03)

## 2020-11-01 PROCEDURE — 80053 COMPREHEN METABOLIC PANEL: CPT | Performed by: INTERNAL MEDICINE

## 2020-11-01 PROCEDURE — 84484 ASSAY OF TROPONIN QUANT: CPT | Performed by: INTERNAL MEDICINE

## 2020-11-01 PROCEDURE — 99232 SBSQ HOSP IP/OBS MODERATE 35: CPT | Performed by: INTERNAL MEDICINE

## 2020-11-01 PROCEDURE — 83690 ASSAY OF LIPASE: CPT | Performed by: INTERNAL MEDICINE

## 2020-11-01 PROCEDURE — 99231 SBSQ HOSP IP/OBS SF/LOW 25: CPT | Performed by: INTERNAL MEDICINE

## 2020-11-01 PROCEDURE — 25010000002 CEFTRIAXONE PER 250 MG: Performed by: INTERNAL MEDICINE

## 2020-11-01 RX ADMIN — ATORVASTATIN CALCIUM 20 MG: 20 TABLET, FILM COATED ORAL at 13:19

## 2020-11-01 RX ADMIN — PANTOPRAZOLE SODIUM 40 MG: 40 TABLET, DELAYED RELEASE ORAL at 17:30

## 2020-11-01 RX ADMIN — AMLODIPINE BESYLATE 10 MG: 10 TABLET ORAL at 08:18

## 2020-11-01 RX ADMIN — PROPRANOLOL HYDROCHLORIDE 60 MG: 60 CAPSULE, EXTENDED RELEASE ORAL at 08:18

## 2020-11-01 RX ADMIN — SUCRALFATE 1 G: 1 TABLET ORAL at 20:00

## 2020-11-01 RX ADMIN — CEFTRIAXONE SODIUM 1 G: 1 INJECTION, SOLUTION INTRAVENOUS at 20:00

## 2020-11-01 RX ADMIN — SUCRALFATE 1 G: 1 TABLET ORAL at 17:30

## 2020-11-01 NOTE — PLAN OF CARE
Goal Outcome Evaluation:  Plan of Care Reviewed With: patient  Progress: improving  No changes today. VSS. To have EGD in AM. Consents signed in chart. NPO p MN. Continue carafate and PPI. Aox4. Likely home after EGD

## 2020-11-01 NOTE — PROGRESS NOTES
NEPHROLOGY PROGRESS NOTE    PATIENT IDENTIFICATION:   Name:  Sadi Reyes      MRN:  [id number]     45 y.o.  male             Reason for visit: Samanta    SUBJECTIVE:   Seen and examined.  Denies shortness of air or chest pain.  Feeling better.  Having very mild left-sided abdominal discomfort  OBJECTIVE:  Vitals:    10/31/20 0757 10/31/20 1636 10/31/20 1946 11/01/20 0656   BP: 155/82 138/92 137/96 150/81   BP Location: Right arm Left arm Left arm Right arm   Patient Position: Lying Sitting Lying Lying   Pulse:  70 59    Resp: 16 16 18 18   Temp: 97.6 °F (36.4 °C) 98.2 °F (36.8 °C) 99.4 °F (37.4 °C)    TempSrc: Oral Oral Oral    SpO2:       Weight:       Height:               Body mass index is 25.53 kg/m².    Intake/Output Summary (Last 24 hours) at 11/1/2020 1104  Last data filed at 10/31/2020 1953  Gross per 24 hour   Intake 530 ml   Output 500 ml   Net 30 ml         Exam:  GEN:  No distress, appears stated age  EYES:   Anicteric sclera  ENT:    External ears/nose normal, MM are moist  NECK:  No adenopathy, JVP none  LUNGS: Normal chest on inspection; not labored  CV:  Normal S1S2, without murmur  ABD:  Non-tender, non-distended, no hepatosplenomegaly, +BS  EXT:  No edema; no cyanosis; clubbing    Scheduled meds:  amLODIPine, 10 mg, Oral, Daily  atorvastatin, 20 mg, Oral, Daily  cefTRIAXone, 1 g, Intravenous, Q24H  pantoprazole, 40 mg, Oral, BID AC  propranolol LA, 60 mg, Oral, Daily  sucralfate, 1 g, Oral, 4x Daily AC & at Bedtime      IV meds:                           Data Review:    Results from last 7 days   Lab Units 11/01/20  0543 10/31/20  0419 10/30/20  1238 10/29/20  0529 10/28/20  1438   SODIUM mmol/L 139 139 140 140 138   POTASSIUM mmol/L 3.9 3.6 3.4* 3.6 3.9   CHLORIDE mmol/L 103 104 104 103 100   CO2 mmol/L 28.7 27.6 26.1 25.0 28.3   BUN mg/dL 10 10 12 21* 25*   CREATININE mg/dL 1.17 1.20 1.41* 2.19* 2.36*   CALCIUM mg/dL 9.3 8.6 8.7 8.7 9.5   BILIRUBIN mg/dL 0.4  --   --  0.3 0.5   ALK PHOS U/L  44  --   --  44 55   ALT (SGPT) U/L 25  --   --  25 40   AST (SGOT) U/L 15  --   --  20 27   GLUCOSE mg/dL 91 81 84 94 122*       Estimated Creatinine Clearance: 95.6 mL/min (by C-G formula based on SCr of 1.17 mg/dL).          Results from last 7 days   Lab Units 10/31/20  0419 10/29/20  0529 10/28/20  1438   WBC 10*3/mm3 7.21 10.50 12.96*   HEMOGLOBIN g/dL 13.4 14.8 16.4   PLATELETS 10*3/mm3 190 196 212                   ASSESSMENT:     SAMANTA (acute kidney injury) (CMS/HCC)    Acute nephritis    Acute pyelonephritis    Leucocytosis    Dehydration             - Samanta on CKD III: BLN CR 1 mg/dl. might be due to NSAID's and Maxzide.  Renal ultrasound showed medical renal disease.  UA is bland.  Serum creatinine  1.2 - > 1.17.      - CKD III with BLN CR 1 mg/dl  - HTN: hold Maxzide  - headache: get Ct head      Plan:  Follow-up with nephrology in 2 to 3 weeks      Giovani Person MD  11/1/2020    11:04 EST

## 2020-11-01 NOTE — PROGRESS NOTES
[phone]   LOS: 2 days     Name: Sadi Reyes  Age/Sex: 45 y.o. male  :  1975        PCP: Christine Mullins APRN  Chief Complaint   Patient presents with   • Chest Pain   • Vomiting   • Nausea   • Diarrhea      Subjective   He is feeling a lot better today.  HA has improved denies CP or palpitations, still with reflux symptoms better with Maalox overnight, want sEGD while in the hospital  General: No Fever or Chills, Cardiac: No Chest Pain or Palpitations, Resp: No Cough or SOA, GI: No Nausea, Vomiting, or Diarrhea and Other: No bleeding    amLODIPine, 10 mg, Oral, Daily  atorvastatin, 20 mg, Oral, Daily  cefTRIAXone, 1 g, Intravenous, Q24H  pantoprazole, 40 mg, Oral, BID AC  propranolol LA, 60 mg, Oral, Daily  sucralfate, 1 g, Oral, 4x Daily AC & at Bedtime           Objective   Vital Signs  Temp:  [97.6 °F (36.4 °C)-99.4 °F (37.4 °C)] 99.4 °F (37.4 °C)  Heart Rate:  [59-70] 59  Resp:  [16-18] 18  BP: (137-155)/(82-96) 137/96  Body mass index is 25.53 kg/m².    Intake/Output Summary (Last 24 hours) at 2020  Last data filed at 10/31/2020 1953  Gross per 24 hour   Intake 530 ml   Output 500 ml   Net 30 ml       Physical Exam  Vitals signs and nursing note reviewed.   Constitutional:       Appearance: Normal appearance.   Cardiovascular:      Rate and Rhythm: Normal rate and regular rhythm.   Pulmonary:      Effort: Pulmonary effort is normal.      Breath sounds: Normal breath sounds.   Abdominal:      General: Abdomen is flat. There is no distension.      Palpations: Abdomen is soft.   Skin:     General: Skin is warm and dry.      Capillary Refill: Capillary refill takes less than 2 seconds.   Neurological:      General: No focal deficit present.      Mental Status: He is alert and oriented to person, place, and time.       Results Review:       I reviewed the patient's new clinical results.  Results from last 7 days   Lab Units 10/31/20  0419 10/29/20  0529 10/28/20  1438   WBC 10*3/mm3  7.21 10.50 12.96*   HEMOGLOBIN g/dL 13.4 14.8 16.4   PLATELETS 10*3/mm3 190 196 212     Results from last 7 days   Lab Units 10/31/20  0419 10/30/20  1238 10/29/20  0529 10/28/20  1438   SODIUM mmol/L 139 140 140 138   POTASSIUM mmol/L 3.6 3.4* 3.6 3.9   CHLORIDE mmol/L 104 104 103 100   CO2 mmol/L 27.6 26.1 25.0 28.3   BUN mg/dL 10 12 21* 25*   CREATININE mg/dL 1.20 1.41* 2.19* 2.36*   CALCIUM mg/dL 8.6 8.7 8.7 9.5   Estimated Creatinine Clearance: 93.2 mL/min (by C-G formula based on SCr of 1.2 mg/dL).      Assessment/Plan   Active Hospital Problems    Diagnosis  POA   • **VANESSA (acute kidney injury) (CMS/HCC) [N17.9]  Yes   • Acute nephritis [N00.9]  Yes   • Acute pyelonephritis [N10]  Yes   • Leucocytosis [D72.829]  Yes   • Dehydration [E86.0]  Yes      Resolved Hospital Problems   No resolved problems to display.       PLAN    - Cr slowly improving likely some degree of ATN  - urine culture negative treating for pyelonephritis  - Gi evaluated and planning EGD in AM, no endoscopy staff to preform scope today  - US GB with polyp otherwise normal  - continue PPI and Carafate   - nephology following, Cr back to baseline      Disposition  Home tomorrow depending on workup      Shahid Meléndez MD  [facility]  11/01/20  06:25 EST

## 2020-11-01 NOTE — PLAN OF CARE
Goal Outcome Evaluation:  Plan of Care Reviewed With: patient  Progress: improving  Outcome Summary: Pt is currently NPO for possible EGD today. Pt requested for PRN Maalox x1 with relief noted.VS stable.Cont on IV atb no adverse reaction noted.

## 2020-11-01 NOTE — PROGRESS NOTES
[facility]  Inpatient Progress Note    Reason for Follow Up:  Chest pain    Subjective     Interval History:   He denies dysphagia.  Still has pain in left chest wall.  Some improvemetn with Maalox last night.      Current Facility-Administered Medications:   •  acetaminophen (TYLENOL) tablet 650 mg, 650 mg, Oral, Q4H PRN **OR** acetaminophen (TYLENOL) 160 MG/5ML solution 650 mg, 650 mg, Oral, Q4H PRN **OR** acetaminophen (TYLENOL) suppository 650 mg, 650 mg, Rectal, Q4H PRN, Rony Parrish MD  •  aluminum-magnesium hydroxide-simethicone (MAALOX MAX) 400-400-40 MG/5ML suspension 15 mL, 15 mL, Oral, Q6H PRN, Shahid Meléndez MD, 15 mL at 10/31/20 1951  •  amLODIPine (NORVASC) tablet 10 mg, 10 mg, Oral, Daily, Shahid Meléndez MD, 10 mg at 11/01/20 0818  •  atorvastatin (LIPITOR) tablet 20 mg, 20 mg, Oral, Daily, Rony Parrish MD, 20 mg at 10/31/20 0834  •  cefTRIAXone (ROCEPHIN) IVPB 1 g, 1 g, Intravenous, Q24H, Rony Parrish MD, Last Rate: 100 mL/hr at 10/31/20 1953, 1 g at 10/31/20 1953  •  HYDROcodone-acetaminophen (NORCO) 5-325 MG per tablet 1 tablet, 1 tablet, Oral, Q6H PRN, Michael Cleaning MD, 1 tablet at 10/30/20 1232  •  ondansetron (ZOFRAN) tablet 4 mg, 4 mg, Oral, Q6H PRN **OR** ondansetron (ZOFRAN) injection 4 mg, 4 mg, Intravenous, Q6H PRN, Rony Parrish MD  •  pantoprazole (PROTONIX) EC tablet 40 mg, 40 mg, Oral, BID AC, Shahid Rodriguez MD, 40 mg at 10/31/20 1709  •  propranolol LA (INDERAL LA) 24 hr capsule 60 mg, 60 mg, Oral, Daily, Rony Parrish MD, 60 mg at 11/01/20 0818  •  sodium chloride 0.9 % flush 10 mL, 10 mL, Intravenous, PRN, Jose Enrique Hunt MD  •  [COMPLETED] Insert peripheral IV, , , Once **AND** sodium chloride 0.9 % flush 10 mL, 10 mL, Intravenous, PRN, Jose Enrique Hunt MD  •  sodium chloride 0.9 % flush 10 mL, 10 mL, Intravenous, PRN, Rony Parrish MD  •  sucralfate (CARAFATE) tablet 1 g, 1 g, Oral, 4x Daily AC & at Bedtime,  Shahid Meléndez MD, 1 g at 10/31/20 2002  Review of Systems:    All systems were reviewed and negative except for:  Cardiovascular: positive for  chest pressure / pain, at rest    Objective     Vital Signs  Temp:  [98.2 °F (36.8 °C)-99.4 °F (37.4 °C)] 99.4 °F (37.4 °C)  Heart Rate:  [59-70] 59  Resp:  [16-18] 18  BP: (137-150)/(81-96) 150/81  Body mass index is 25.53 kg/m².    Intake/Output Summary (Last 24 hours) at 11/1/2020 1242  Last data filed at 10/31/2020 1953  Gross per 24 hour   Intake 530 ml   Output 500 ml   Net 30 ml     No intake/output data recorded.     Physical Exam:   General: patient awake, alert and cooperative   Abdomen: soft, nontender, nondistended; normal bowel sounds   Rectal: deferred   Extremities: no rash or edema   Psychiatric: Normal mood and behavior; memory intact     Results Review:     I reviewed the patient's new clinical results.    Results from last 7 days   Lab Units 10/31/20  0419 10/29/20  0529 10/28/20  1438   WBC 10*3/mm3 7.21 10.50 12.96*   HEMOGLOBIN g/dL 13.4 14.8 16.4   HEMATOCRIT % 40.1 42.8 48.8   PLATELETS 10*3/mm3 190 196 212     Results from last 7 days   Lab Units 11/01/20  0543 10/31/20  0419 10/30/20  1238 10/29/20  0529 10/28/20  1438   SODIUM mmol/L 139 139 140 140 138   POTASSIUM mmol/L 3.9 3.6 3.4* 3.6 3.9   CHLORIDE mmol/L 103 104 104 103 100   CO2 mmol/L 28.7 27.6 26.1 25.0 28.3   BUN mg/dL 10 10 12 21* 25*   CREATININE mg/dL 1.17 1.20 1.41* 2.19* 2.36*   CALCIUM mg/dL 9.3 8.6 8.7 8.7 9.5   BILIRUBIN mg/dL 0.4  --   --  0.3 0.5   ALK PHOS U/L 44  --   --  44 55   ALT (SGPT) U/L 25  --   --  25 40   AST (SGOT) U/L 15  --   --  20 27   GLUCOSE mg/dL 91 81 84 94 122*         Lab Results   Lab Value Date/Time    LIPASE 37 11/01/2020 0543    LIPASE 17 10/28/2020 1438         Assessment/Plan   Assessment:   1.  Non cardiac chest pain  2.  Dyspepsia    Plan:   Plan for EGD tomorrow to assess his non-cardiac chest pain/dyspepsia.  Offered outpatient evaluation  but patient prefers to stay for inpatient EGD.  Cardiology has given ok from their standpoint.  NPO at MN.      I discussed the patients findings and my recommendations with patient.         Tono Mendoza M.D.  [facility]  [address] [place] [address]  Office: [phone]

## 2020-11-01 NOTE — PROGRESS NOTES
[facility]  Cardiology Progress Note    Patient Identification:  Name: Sadi Reyes  Age: 45 y.o.  Sex: male  :  1975  MRN: [id number]                 Follow Up / Chief Complaint: Epigastric pain    Interval History:  No chest pains or tightness in the chest     Subjective:      Objective:    Past Medical History:  Past Medical History:   Diagnosis Date   • Carpal tunnel syndrome    • Hypertension    • Palpitations      Past Surgical History:  No past surgical history on file.     Social History:   Social History     Tobacco Use   • Smoking status: Never Smoker   • Smokeless tobacco: Never Used   Substance Use Topics   • Alcohol use: Yes      Family History:  Family History   Problem Relation Age of Onset   • Hypertension Mother    • No Known Problems Father    • No Known Problems Sister           Allergies:  No Known Allergies  Scheduled Meds:  amLODIPine, 10 mg, Daily  atorvastatin, 20 mg, Daily  cefTRIAXone, 1 g, Q24H  pantoprazole, 40 mg, BID AC  propranolol LA, 60 mg, Daily  sucralfate, 1 g, 4x Daily AC & at Bedtime            INTAKE AND OUTPUT:    Intake/Output Summary (Last 24 hours) at 2020 1359  Last data filed at 2020 1300  Gross per 24 hour   Intake 530 ml   Output 300 ml   Net 230 ml       Review of Systems:   GI:  Cardiac: No chest pain  Pulmonary:    Constitutional:  Temp:  [98 °F (36.7 °C)-99.4 °F (37.4 °C)] 98 °F (36.7 °C)  Heart Rate:  [59-70] 59  Resp:  [16-20] 20  BP: (137-150)/(81-96) 150/81    Physical Exam:  General:  Appears in no acute distress  Eyes: PERTL,  HEENT:  No JVD. Thyroid not visibly enlarged. No mucosal pallor or cyanosis  Respiratory: Respirations regular and unlabored at rest. BBS with good air entry in all fields. No crackles, rubs or wheezes auscultated  Cardiovascular: S1S2 Regular rate and rhythm. No murmur, rub or gallop. No carotid bruits. DP/PT pulses     . No pretibial pitting edema  Gastrointestinal: Abdomen soft, flat, non tender.  "Bowel sounds present. No hepatosplenomegaly. No ascites  Musculoskeletal: YEPEZ x4. No abnormal movements  Extremities: No digital clubbing or cyanosis  Skin: Color pink. Skin warm and dry to touch. No rashes    Neuro: AAO x3 CN II-XII grossly intact  Psych: Mood and affect normal, pleasant and cooperative          Cardiographics  Telemetry:     ECG:     Echocardiogram:     Lab Review   Results from last 7 days   Lab Units 11/01/20  0543 10/30/20  1238 10/28/20  1438   CK TOTAL U/L  --   --  175   TROPONIN T ng/mL <0.010 <0.010 <0.010         Results from last 7 days   Lab Units 11/01/20  0543   SODIUM mmol/L 139   POTASSIUM mmol/L 3.9   BUN mg/dL 10   CREATININE mg/dL 1.17   CALCIUM mg/dL 9.3     @[id number]@  Results from last 7 days   Lab Units 10/31/20  0419 10/29/20  0529 10/28/20  1438   WBC 10*3/mm3 7.21 10.50 12.96*   HEMOGLOBIN g/dL 13.4 14.8 16.4   HEMATOCRIT % 40.1 42.8 48.8   PLATELETS 10*3/mm3 190 196 212             Assessment:  1. Epigastric/chest pain.  His pain is mainly epigastric with some radiation to his chest.  It started after he experienced nausea and vomiting and has been constant since.  His symptoms appear atypical for cardiac pain.  I suspect this is GI.  Troponin negative x 1.  EKG unchanged compared with prior EKG in 2/2020.  2. VANESSA. Some improvement yesterday. No labs this morning.  Nephrology following.   3. Acute pyelonephritis  4. Acute nephritis  5. Hypertension. Fairly well controlled.   6. Headaches. Neurology following.      Plan:    Cardiovascular remains stable  Troponins are negative  Continue conservative management  EGD tomorrow clear for the endoscopy    )11/1/2020  MD ANYA Mckeon/Transcription:   \"Dictated utilizing Dragon dictation\".     "

## 2020-11-02 ENCOUNTER — ANESTHESIA EVENT (OUTPATIENT)
Dept: GASTROENTEROLOGY | Facility: HOSPITAL | Age: 45
End: 2020-11-02

## 2020-11-02 ENCOUNTER — READMISSION MANAGEMENT (OUTPATIENT)
Dept: CALL CENTER | Facility: HOSPITAL | Age: 45
End: 2020-11-02

## 2020-11-02 ENCOUNTER — ANESTHESIA (OUTPATIENT)
Dept: GASTROENTEROLOGY | Facility: HOSPITAL | Age: 45
End: 2020-11-02

## 2020-11-02 VITALS
HEIGHT: 71 IN | WEIGHT: 162.4 LBS | RESPIRATION RATE: 16 BRPM | OXYGEN SATURATION: 96 % | BODY MASS INDEX: 22.73 KG/M2 | DIASTOLIC BLOOD PRESSURE: 84 MMHG | TEMPERATURE: 98.9 F | HEART RATE: 55 BPM | SYSTOLIC BLOOD PRESSURE: 128 MMHG

## 2020-11-02 PROBLEM — N18.31 STAGE 3A CHRONIC KIDNEY DISEASE: Chronic | Status: ACTIVE | Noted: 2020-11-02

## 2020-11-02 PROBLEM — G44.86 CERVICOGENIC HEADACHE: Chronic | Status: ACTIVE | Noted: 2020-11-02

## 2020-11-02 LAB
ANION GAP SERPL CALCULATED.3IONS-SCNC: 8.8 MMOL/L (ref 5–15)
BACTERIA SPEC AEROBE CULT: NORMAL
BACTERIA SPEC AEROBE CULT: NORMAL
BUN SERPL-MCNC: 11 MG/DL (ref 6–20)
BUN/CREAT SERPL: 9.2 (ref 7–25)
CALCIUM SPEC-SCNC: 9.4 MG/DL (ref 8.6–10.5)
CHLORIDE SERPL-SCNC: 105 MMOL/L (ref 98–107)
CO2 SERPL-SCNC: 28.2 MMOL/L (ref 22–29)
CREAT SERPL-MCNC: 1.2 MG/DL (ref 0.76–1.27)
GFR SERPL CREATININE-BSD FRML MDRD: 80 ML/MIN/1.73
GLUCOSE SERPL-MCNC: 95 MG/DL (ref 65–99)
POTASSIUM SERPL-SCNC: 3.7 MMOL/L (ref 3.5–5.2)
SODIUM SERPL-SCNC: 142 MMOL/L (ref 136–145)

## 2020-11-02 PROCEDURE — 43239 EGD BIOPSY SINGLE/MULTIPLE: CPT | Performed by: INTERNAL MEDICINE

## 2020-11-02 PROCEDURE — 99232 SBSQ HOSP IP/OBS MODERATE 35: CPT | Performed by: NURSE PRACTITIONER

## 2020-11-02 PROCEDURE — 25010000002 PROPOFOL 10 MG/ML EMULSION: Performed by: ANESTHESIOLOGY

## 2020-11-02 PROCEDURE — 88305 TISSUE EXAM BY PATHOLOGIST: CPT | Performed by: INTERNAL MEDICINE

## 2020-11-02 PROCEDURE — 80048 BASIC METABOLIC PNL TOTAL CA: CPT | Performed by: INTERNAL MEDICINE

## 2020-11-02 PROCEDURE — 0DB68ZX EXCISION OF STOMACH, VIA NATURAL OR ARTIFICIAL OPENING ENDOSCOPIC, DIAGNOSTIC: ICD-10-PCS | Performed by: INTERNAL MEDICINE

## 2020-11-02 RX ORDER — AMLODIPINE BESYLATE 10 MG/1
10 TABLET ORAL DAILY
Qty: 30 TABLET | Refills: 0 | Status: SHIPPED | OUTPATIENT
Start: 2020-11-03 | End: 2021-03-11 | Stop reason: SDUPTHER

## 2020-11-02 RX ORDER — SODIUM CHLORIDE 9 MG/ML
1000 INJECTION, SOLUTION INTRAVENOUS CONTINUOUS
Status: DISCONTINUED | OUTPATIENT
Start: 2020-11-02 | End: 2020-11-02 | Stop reason: HOSPADM

## 2020-11-02 RX ORDER — SUCRALFATE 1 G/1
1 TABLET ORAL
Status: DISCONTINUED | OUTPATIENT
Start: 2020-11-02 | End: 2020-11-02 | Stop reason: HOSPADM

## 2020-11-02 RX ORDER — SUCRALFATE 1 G/1
1 TABLET ORAL
Qty: 60 TABLET | Refills: 0 | Status: SHIPPED | OUTPATIENT
Start: 2020-11-02 | End: 2021-02-04

## 2020-11-02 RX ORDER — LIDOCAINE HYDROCHLORIDE 20 MG/ML
INJECTION, SOLUTION INFILTRATION; PERINEURAL AS NEEDED
Status: DISCONTINUED | OUTPATIENT
Start: 2020-11-02 | End: 2020-11-02 | Stop reason: SURG

## 2020-11-02 RX ORDER — PANTOPRAZOLE SODIUM 40 MG/1
40 TABLET, DELAYED RELEASE ORAL
Qty: 60 TABLET | Refills: 0 | Status: SHIPPED | OUTPATIENT
Start: 2020-11-02 | End: 2020-11-10 | Stop reason: SDUPTHER

## 2020-11-02 RX ORDER — PROPOFOL 10 MG/ML
VIAL (ML) INTRAVENOUS AS NEEDED
Status: DISCONTINUED | OUTPATIENT
Start: 2020-11-02 | End: 2020-11-02 | Stop reason: SURG

## 2020-11-02 RX ADMIN — AMLODIPINE BESYLATE 10 MG: 10 TABLET ORAL at 09:50

## 2020-11-02 RX ADMIN — PANTOPRAZOLE SODIUM 40 MG: 40 TABLET, DELAYED RELEASE ORAL at 07:35

## 2020-11-02 RX ADMIN — SUCRALFATE 1 G: 1 TABLET ORAL at 07:35

## 2020-11-02 RX ADMIN — LIDOCAINE HYDROCHLORIDE 100 MG: 20 INJECTION, SOLUTION INFILTRATION; PERINEURAL at 11:37

## 2020-11-02 RX ADMIN — SODIUM CHLORIDE 1000 ML: 9 INJECTION, SOLUTION INTRAVENOUS at 11:20

## 2020-11-02 RX ADMIN — PROPOFOL 210 MG: 10 INJECTION, EMULSION INTRAVENOUS at 11:37

## 2020-11-02 NOTE — PROGRESS NOTES
Hospital Follow Up    LOS:  LOS: 3 days   Patient Name: Sadi Reyes  Age/Sex: 45 y.o. male  : 1975  MRN: [id number]    Day of Service: 20   Length of Stay: 3  Encounter Provider: CHARI Grady  Place of Service: [facility]  Patient Care Team:  Christine Mullins APRN as PCP - General (Family Medicine)    Subjective:     Chief Complaint: chest pain    Interval History: Some on and off chest tightness    Objective:     Objective:  Temp:  [98 °F (36.7 °C)-98.9 °F (37.2 °C)] 98.9 °F (37.2 °C)  Heart Rate:  [52-61] 55  Resp:  [14-20] 14  BP: (125-144)/(71-91) 143/90     Intake/Output Summary (Last 24 hours) at 2020 1113  Last data filed at 2020 0900  Gross per 24 hour   Intake 0 ml   Output 450 ml   Net -450 ml     Body mass index is 22.66 kg/m².      10/29/20  1659 10/30/20  1230 20  0547   Weight: 83.1 kg (183 lb 4.8 oz) 83 kg (182 lb 15.7 oz) 73.7 kg (162 lb 6.4 oz)     Weight change:     Physical Exam:   General Appearance:    Awake alert and oriented in no acute distress.   Color:  Skin:  Neuro:  HEENT:    Lungs:     Pink  Warm and dry  No focal, motor or sensory deficits  Neck supple, pupils equal, round and reactive. No JVD, No Bruit  Clear to auscultation,respirations regular, even and                  unlabored    Heart:    Regular rate and rhythm, S1 and S2, no murmur, no gallop, no rub. No edema, DP/PT pulses are 2+   Chest Wall:    No abnormalities observed   Abdomen:     Normal bowel sounds, no masses, no organomegaly, soft        non-tender, non-distended, no guarding, no ascites noted   Extremities:   Moves all extremities well, no edema, no cyanosis, no redness       Lab Review:   Results from last 7 days   Lab Units 20  0447 20  0543  10/29/20  0529   SODIUM mmol/L 142 139   < > 140   POTASSIUM mmol/L 3.7 3.9   < > 3.6   CHLORIDE mmol/L 105 103   < > 103   CO2 mmol/L 28.2 28.7   < > 25.0   BUN mg/dL 11 10    < > 21*   CREATININE mg/dL 1.20 1.17   < > 2.19*   GLUCOSE mg/dL 95 91   < > 94   CALCIUM mg/dL 9.4 9.3   < > 8.7   AST (SGOT) U/L  --  15  --  20   ALT (SGPT) U/L  --  25  --  25    < > = values in this interval not displayed.     Results from last 7 days   Lab Units 11/01/20  0543 10/30/20  1238 10/28/20  1438   CK TOTAL U/L  --   --  175   TROPONIN T ng/mL <0.010 <0.010 <0.010     Results from last 7 days   Lab Units 10/31/20  0419 10/29/20  0529   WBC 10*3/mm3 7.21 10.50   HEMOGLOBIN g/dL 13.4 14.8   HEMATOCRIT % 40.1 42.8   PLATELETS 10*3/mm3 190 196                   Invalid input(s): LDLCALC          I reviewed the patient's new clinical results.  I personally viewed and interpreted the patient's EKG  Current Medications:   Scheduled Meds:amLODIPine, 10 mg, Oral, Daily  atorvastatin, 20 mg, Oral, Daily  pantoprazole, 40 mg, Oral, BID AC  propranolol LA, 60 mg, Oral, Daily  sucralfate, 1 g, Oral, 4x Daily AC & at Bedtime      Continuous Infusions:sodium chloride, 1,000 mL        Allergies:  Allergies   Allergen Reactions   • Inderal La [Propranolol] Diarrhea and Nausea And Vomiting     ECHO 10/30/2020  · Estimated left ventricular EF = 60% Left ventricular systolic function is normal.  · The left atrial cavity is moderately dilated.  · Left ventricular diastolic function is consistent with (grade III w/high LAP) reversible restrictive pattern.         Assessm/ent:       VANESSA (acute kidney injury) (CMS/HCC)    Acute nephritis    Acute pyelonephritis    Leucocytosis    Dehydration    Nausea vomiting and diarrhea    Acute abdominal pain    1. Atypical; Chest pain  2. Grade 3 restrictive diastolic dysfunction  3. VANESSA/Pyelonephritis    Plan:       Getting EGD today. Echo reviewed and shows restrictive diastolic dysfunction. Continue current regimen.     CHARI Grady  11/02/20  11:13 EST  Electronically signed by CHARI Grady, 11/02/20, 11:13 AM EST.

## 2020-11-02 NOTE — ANESTHESIA POSTPROCEDURE EVALUATION
"Patient: Sadi Graves    Procedure Summary     Date: 11/02/20 Room / Location:  [facility] /  [facility]    Anesthesia Start: 1135 Anesthesia Stop: 1149    Procedure: ESOPHAGOGASTRODUODENOSCOPY with biopsies (N/A Esophagus) Diagnosis:       Nausea vomiting and diarrhea      Acute abdominal pain      (Nausea vomiting and diarrhea [R11.2, R19.7])      (Acute abdominal pain [R10.9])    Surgeon: Tono Romero MD Provider: Mat Victor MD    Anesthesia Type: MAC ASA Status: 2          Anesthesia Type: MAC    Vitals  Vitals Value Taken Time   /80 11/02/20 1151   Temp     Pulse 56 11/02/20 1151   Resp 16 11/02/20 1151   SpO2 96 % 11/02/20 1151           Post Anesthesia Care and Evaluation    Patient location during evaluation: bedside  Patient participation: complete - patient participated  Level of consciousness: awake and alert  Pain management: adequate  Airway patency: patent  Anesthetic complications: No anesthetic complications    Cardiovascular status: acceptable  Respiratory status: acceptable  Hydration status: acceptable    Comments: /80 (BP Location: Left arm, Patient Position: Lying)   Pulse 56   Temp 37.2 °C (98.9 °F) (Oral)   Resp 16   Ht 180.3 cm (70.98\")   Wt 73.7 kg (162 lb 6.4 oz)   SpO2 96%   BMI 22.66 kg/m²       "

## 2020-11-02 NOTE — PLAN OF CARE
Goal Outcome Evaluation:  Plan of Care Reviewed With: patient  Progress: improving  Outcome Summary: Pt had EGD, ready to go home. Will ct to monitor.

## 2020-11-02 NOTE — ANESTHESIA PREPROCEDURE EVALUATION
Anesthesia Evaluation     NPO Solid Status: > 8 hours  NPO Liquid Status: > 8 hours           Airway   Mallampati: II  TM distance: >3 FB  Neck ROM: full  no difficulty expected  Dental - normal exam     Pulmonary - normal exam   Cardiovascular - normal exam    (+) hypertension,       Neuro/Psych  (+) numbness,     GI/Hepatic/Renal/Endo    (+)   renal disease,     Musculoskeletal     Abdominal  - normal exam   Substance History      OB/GYN          Other                        Anesthesia Plan    ASA 2     MAC       Anesthetic plan, all risks, benefits, and alternatives have been provided, discussed and informed consent has been obtained with: patient.

## 2020-11-02 NOTE — DISCHARGE SUMMARY
Patient Name: Sadi Reyes  : 1975  MRN: [id number]    Date of Admission: 10/28/2020  Date of Discharge:  2020  Primary Care Physician: Christine Mullins APRN      Chief Complaint:   Chest Pain, Vomiting, Nausea, and Diarrhea      Discharge Diagnoses     Active Hospital Problems    Diagnosis  POA   • **VANESSA (acute kidney injury) (CMS/HCC) [N17.9]  Yes   • Stage 3a chronic kidney disease [N18.31]  Yes   • Cervicogenic headache [R51.9]  Yes   • Hypertension [I10]  Yes   • Diastolic dysfunction without heart failure [I51.89]  Yes   • Acute gastritis without bleeding [K29.00]  Yes   • Acute duodenitis [K29.80]  Yes   • Non-cardiac chest pain [R07.89]  Yes   • Acute pyelonephritis [N10]  Yes   • Leucocytosis [D72.829]  Yes   • Dehydration [E86.0]  Yes   • Nausea vomiting and diarrhea [R11.2, R19.7]  Yes   • Acute abdominal pain [R10.9]  Yes      Resolved Hospital Problems   No resolved problems to display.        Hospital Course     Very pleasant 44yo gentleman who presented to ER c/o HA, N/V/D, chest pain, and was found to have bilateral pyelonephritis and VANESSA. Please see below for details of admission:    Chest pain: GI source, resolved, has some diastolic dysfunction on Echo, f/u with Card in a month    Acute gastritis and duodenitis: s/p EGD here, path pending, okay for dc home on BID PPI and BID Carafate, f/u with GI in a few weeks for path results and general f/u.    Bilateral Pyelonephritis: urine culture not helpful, has completed 5d course of IV Rocephin here    VANESSA/CKD3: followed by Renal, Cr at baseline now after IVFs, his Maxzide is now dc'd, okay to dc home, f/u with Renal in a couple weeks    Headache: followed by Neuro, imaging unremarkable, most likely cervicogenic, f/u with PCP, may consider outpt PT    HTN: off Maxzide now, amlodipine dose doubled to 10mg per day, f/u with PCP in a week      Day of Discharge     Subjective:  Feeling fine today. No more chest pain or HA. Voiding well.  Tolerating regular diet after EGD earlier today.     Review of Systems    Physical Exam:  Temp:  [98.3 °F (36.8 °C)-98.9 °F (37.2 °C)] 98.9 °F (37.2 °C)  Heart Rate:  [52-61] 55  Resp:  [14-20] 16  BP: (121-144)/(71-91) 128/84  Body mass index is 22.66 kg/m².  Physical Exam  Vitals signs and nursing note reviewed.   Constitutional:       Appearance: Normal appearance.   Cardiovascular:      Rate and Rhythm: Normal rate and regular rhythm.   Pulmonary:      Effort: Pulmonary effort is normal.      Breath sounds: Normal breath sounds.   Abdominal:      General: Abdomen is flat. There is no distension.      Palpations: Abdomen is soft.   Skin:     General: Skin is warm and dry.      Capillary Refill: Capillary refill takes less than 2 seconds.   Neurological:      General: No focal deficit present.      Mental Status: He is alert and oriented to person, place, and time.     Consultants     Consult Orders (all) (From admission, onward)     Start     Ordered    10/31/20 0807  Inpatient Gastroenterology Consult  Once     Specialty:  Gastroenterology  Provider:  Alexx Julien MD    10/31/20 0806    10/29/20 1532  Inpatient Neurology Consult General  Once     Specialty:  Neurology  Provider:  Mat Hudson MD    10/29/20 1532    10/29/20 1530  Inpatient Cardiology Consult  Once     Specialty:  Cardiology  Provider:  Keith Lyman MD    10/29/20 1529    10/28/20 2037  Inpatient Nephrology Consult  Once     Specialty:  Nephrology  Provider:  Michael Perla MD    10/28/20 2036    10/28/20 1721  Nephrology (on -call MD unless specified)  Once     Specialty:  Nephrology  Provider:  Michael Perla MD    10/28/20 1720    10/28/20 1719  [facility] (on-call MD unless specified) Details  Once     Specialty:  Hospitalist  Provider:  Rony Parrish MD    10/28/20 1718              Procedures     Imaging Results (All)     Procedure Component Value Units Date/Time    US Gallbladder [[id number]] Collected: 10/31/20  1645     Updated: 10/31/20 1654    Narrative:      GALLBLADDER ULTRASOUND     HISTORY: Epigastric pain.     FINDINGS: The gallbladder contains several tiny echogenic foci that  appear to be adherent to the gallbladder wall without shadowing. The  findings suggest tiny gallbladder polyps. There is no wall thickening  and the common bile duct is normal in caliber, measuring 4 mm. The  visualized liver and pancreas and right kidney are unremarkable as also  evaluated on recent CT scan.     CONCLUSION: Tiny echogenic foci adherent to the gallbladder wall without  shadowing and likely representing tiny gallbladder polyps. No evidence  of biliary obstruction.     This report was finalized on 10/31/2020 4:50 PM by Dr. Usman Kaplan M.D.       CT Head Without Contrast [[id number]] Collected: 10/30/20 1033     Updated: 10/30/20 1514    Narrative:      CT HEAD WITHOUT CONTRAST     HISTORY: Headache. Retro-orbital pain.     COMPARISON: CT head 03/26/2020.     FINDINGS: There is beam hardening artifact from a metallic fragment  involving the soft tissues of the scalp in the right parietal region.     The brain ventricles are symmetrical. There is no evidence of  intracranial hemorrhage, hydrocephalus or of abnormal extra-axial fluid.  No focal area of decreased attenuation to suggest acute infarction is  identified.       Impression:      No evidence of acute infarction or hemorrhage. The study is  hampered by beam hardening artifact from a metallic foreign object  involving the soft tissue of the scalp in the right parietal region.            Radiation dose reduction techniques were utilized, including automated  exposure control and exposure modulation based on body size.     This report was finalized on 10/30/2020 3:11 PM by Dr. Shahid Navarro M.D.       XR Spine Cervical 2 or 3 View [[id number]] Collected: 10/30/20 1317     Updated: 10/30/20 1339    Narrative:      TWO-VIEW CERVICAL SPINE     HISTORY: Neck pain.      FINDINGS:  The vertebral height and disc spaces are well-maintained and  the alignment appears normal. No significant abnormality is seen.     This report was finalized on 10/30/2020 1:36 PM by Dr. Usman Kaplan M.D.       US Renal Bilateral [[id number]] Collected: 10/29/20 1719     Updated: 10/29/20 1731    Narrative:      Examination: Renal sonography     HISTORY: 45-year-old male with a history of acute renal insufficiency     FINDINGS: Sonographic evaluation of the kidneys was performed.  Comparison is made to a CT of the abdomen without contrast dated  10/28/2020. The right kidney measures 11.4 x 4.9 x 4.0 cm. The left  kidney measures 10.6 x 7.2 x 5.2 cm. Both kidneys demonstrate mild  increased echogenicity of the renal cortices. A trace amount of right  renal perinephric fluid is noted. There is no hydronephrosis or  hydroureter. The urinary bladder appears normal with visualization of a  left ureteral jet.        Impression:      1.There is no evidence for renal obstruction. Evidence of bilateral  medical renal disease is noted.     This report was finalized on 10/29/2020 5:27 PM by Dr. Ajit Lovelace M.D.       XR Chest 1 View [[id number]] Collected: 10/28/20 1737     Updated: 10/28/20 1740    Narrative:      PORTABLE CHEST X-RAY     HISTORY: Chest discomfort. Abdominal pain with vomiting and nausea.  Diarrhea for two weeks.     Portable chest x-ray is provided. Correlation: Chest x-ray 08/21/2017.      FINDINGS: The cardiomediastinal silhouette is normal. The lungs are  clear. The costophrenic sulci are dry and the bones appear normal. There  is no pneumothorax.       Impression:      Negative.     This report was finalized on 10/28/2020 5:37 PM by Dr. Mat Espinal M.D.       CT Abdomen Pelvis Without Contrast [[id number]] Collected: 10/28/20 1656     Updated: 10/28/20 1708    Narrative:      ABDOMEN AND PELVIS CT WITHOUT CONTRAST     HISTORY: Left lower quadrant abdominal pain.     TECHNIQUE:  Abdomen and pelvis CT was performed without contrast. There  is no previous abdominal imaging for correlation.     Radiation dose reduction techniques were utilized, including automated  exposure control and exposure modulation based on body size.     FINDINGS: Visualized lung bases are clear. There is no pleural or  pericardial effusion.     The gallbladder is in situ and has a normal noncontrast CT appearance.  Parenchyma of the liver, pancreas, adrenals, spleen, and kidneys has a  normal noncontrast CT appearance. There is no hydronephrosis or ureter  nor is any renal or ureteral calculus. However, there is abnormal  perinephric stranding bilaterally. Urinary bladder appears normal.     The colon, appendix, small bowel, and the stomach appear normal. There  is no abdominal or pelvic lymphadenopathy. No free fluid is present.  Advanced degenerative disc change is observed at L5-S1.       Impression:      Abnormal bilateral perinephric stranding without  hydronephrosis or evidence of stone disease may represent pyelonephritis  or other medical renal disease. Abdomen and pelvis CT is otherwise  negative.     This report was finalized on 10/28/2020 5:04 PM by Dr. Mat Espinal M.D.             Pertinent Labs     Results from last 7 days   Lab Units 10/31/20  0419 10/29/20  0529 10/28/20  1438   WBC 10*3/mm3 7.21 10.50 12.96*   HEMOGLOBIN g/dL 13.4 14.8 16.4   PLATELETS 10*3/mm3 190 196 212     Results from last 7 days   Lab Units 11/02/20  0447 11/01/20  0543 10/31/20  0419 10/30/20  1238   SODIUM mmol/L 142 139 139 140   POTASSIUM mmol/L 3.7 3.9 3.6 3.4*   CHLORIDE mmol/L 105 103 104 104   CO2 mmol/L 28.2 28.7 27.6 26.1   BUN mg/dL 11 10 10 12   CREATININE mg/dL 1.20 1.17 1.20 1.41*   GLUCOSE mg/dL 95 91 81 84   Estimated Creatinine Clearance: 82.7 mL/min (by C-G formula based on SCr of 1.2 mg/dL).  Results from last 7 days   Lab Units 11/01/20  0543 10/29/20  0529 10/28/20  1438   ALBUMIN g/dL 3.90 3.80 4.60    BILIRUBIN mg/dL 0.4 0.3 0.5   ALK PHOS U/L 44 44 55   AST (SGOT) U/L 15 20 27   ALT (SGPT) U/L 25 25 40     Results from last 7 days   Lab Units 11/02/20  0447 11/01/20  0543 10/31/20  0419 10/30/20  1238 10/29/20  0529 10/28/20  1438   CALCIUM mg/dL 9.4 9.3 8.6 8.7 8.7 9.5   ALBUMIN g/dL  --  3.90  --   --  3.80 4.60     Results from last 7 days   Lab Units 11/01/20  0543 10/28/20  1438   LIPASE U/L 37 17     Results from last 7 days   Lab Units 11/01/20  0543 10/30/20  1238 10/28/20  1438   CK TOTAL U/L  --   --  175   TROPONIN T ng/mL <0.010 <0.010 <0.010           Invalid input(s): LDLCALC  Results from last 7 days   Lab Units 10/28/20  2233 10/28/20  1442   BLOODCX  No growth at 4 days  No growth at 4 days  --    URINECX   --  No growth       Test Results Pending at Discharge     Pending Labs     Order Current Status    Tissue Pathology Exam Collected (11/02/20 1145)    Blood Culture - Blood, Arm, Left Preliminary result    Blood Culture - Blood, Arm, Left Preliminary result          Discharge Details        Discharge Medications      New Medications      Instructions Start Date   pantoprazole 40 MG EC tablet  Commonly known as: PROTONIX   40 mg, Oral, 2 Times Daily Before Meals      sucralfate 1 g tablet  Commonly known as: CARAFATE   1 g, Oral, 2 Times Daily Before Meals         Changes to Medications      Instructions Start Date   amLODIPine 10 MG tablet  Commonly known as: NORVASC  What changed:   · medication strength  · how much to take   10 mg, Oral, Daily   Start Date: November 3, 2020        Continue These Medications      Instructions Start Date   atorvastatin 20 MG tablet  Commonly known as: LIPITOR   20 mg, Oral, Daily      traMADol 50 MG tablet  Commonly known as: ULTRAM   50 mg, Oral, Every 6 Hours PRN         Stop These Medications    propranolol LA 60 MG 24 hr capsule  Commonly known as: INDERAL LA     triamterene-hydrochlorothiazide 37.5-25 MG per tablet  Commonly known as: MAXZIDE-25             Allergies   Allergen Reactions   • Inderal La [Propranolol] Diarrhea and Nausea And Vomiting         Discharge Disposition:  Home or Self Care    Discharge Diet:  No active diet order      Discharge Activity:   as tolerated    CODE STATUS:    Code Status and Medical Interventions:   Ordered at: 10/28/20 2038     Code Status:    CPR     Medical Interventions (Level of Support Prior to Arrest):    Full       Future Appointments   Date Time Provider Department Center   1/8/2021  9:00 AM Rony Arrieta APRN [facility]   2/4/2021 10:00 AM Christine Mullins APRN [facility] None     Additional Instructions for the Follow-ups that You Need to Schedule     Discharge Follow-up with PCP   As directed       Currently Documented PCP:    Christine Mullins APRN    PCP Phone Number:    [phone]     Follow Up Details: Harpreet NP (PCP) in 1week         Discharge Follow-up with Specified Provider: Dr. Gonzalez (Renal); 2 Weeks   As directed      To: Dr. Gonzalez (Renal)    Follow Up: 2 Weeks         Discharge Follow-up with Specified Provider: Dr. Romero (GI); 3 Weeks   As directed      To: Dr. Romero (GI)    Follow Up: 3 Weeks         Discharge Follow-up with Specified Provider: Dr. Qureshi (Card); 1 Month   As directed      To: Dr. Qureshi (Card)    Follow Up: 1 Month           Follow-up Information     Paulie Barksdale MD Follow up in 1 month(s).    Specialty: General Surgery  Why: Follow up regarding galbladder polyp seen on imaging  Contact information:  [address] [id number] [phone]             Christine Mullins APRN .    Specialty: Family Medicine  Why: Harpreet NP (PCP) in 1week  Contact information:  [address] [facility] [id number] [phone]                   Additional Instructions for the Follow-ups that You Need to Schedule     Discharge Follow-up with PCP   As directed       Currently Documented PCP:    Christine Mullins APRN    PCP Phone Number:     [phone]     Follow Up Details: Harpreet ORDONEZ (PCP) in 1week         Discharge Follow-up with Specified Provider: Dr. Gonzalez (Renal); 2 Weeks   As directed      To: Dr. Gonzalez (Renal)    Follow Up: 2 Weeks         Discharge Follow-up with Specified Provider: Dr. Romero (GI); 3 Weeks   As directed      To: Dr. Romero (GI)    Follow Up: 3 Weeks         Discharge Follow-up with Specified Provider: Dr. Qureshi (Card); 1 Month   As directed      To: Dr. Qureshi (Card)    Follow Up: 1 Month           Time Spent on Discharge:  Greater than 30 minutes      Mat Rizo MD  [facility]  11/02/20  13:19 EST

## 2020-11-02 NOTE — PROGRESS NOTES
Case Management Discharge Note      Final Note: Home; no needs identified.  LINCOLN Murillo         Selected Continued Care - Discharged on 11/2/2020 Admission date: 10/28/2020 - Discharge disposition: Home or Self Care    Destination    No services have been selected for the patient.              Durable Medical Equipment    No services have been selected for the patient.              Dialysis/Infusion    No services have been selected for the patient.              Home Medical Care    No services have been selected for the patient.              Therapy    No services have been selected for the patient.              Community Resources    No services have been selected for the patient.                  Transportation Services  Private: Car    Final Discharge Disposition Code: 01 - home or self-care

## 2020-11-02 NOTE — PAYOR COMM NOTE
"Sadi Reyes (45 y.o. Male)     PLEASE SEE ATTACHED DC SUMMARY    REF#[id number]    THANK YOU    JANICE HOLLIDAY LPN CCP    Date of Birth Social Security Number Address Home Phone MRN    1975  [id number] [facility] [address]  [id number]    Sikhism Marital Status          Scientologist        Admission Date Admission Type Admitting Provider Attending Provider Department, Room/Bed    10/28/20 Emergency Rony Parrish MD  [address], [address]    Discharge Date Discharge Disposition Discharge Destination        2020 Home or Self Care              Attending Provider: (none)   Allergies: Inderal La [Propranolol]    Isolation: None   Infection: None   Code Status: CPR    Ht: 180.3 cm (70.98\")   Wt: 73.7 kg (162 lb 6.4 oz)    Admission Cmt: None   Principal Problem: VANESSA (acute kidney injury) (CMS/[facility]) [N17.9]                 Active Insurance as of 10/28/2020     Primary Coverage     Payor Plan Insurance Group Employer/Plan Group    [employer] PPO [id number]     Payor Plan Address Payor Plan Phone Number Payor Plan Fax Number Effective Dates    [address] [phone]  2019 - None Entered    [place] [address]       Subscriber Name Subscriber Birth Date Member ID       SADI REYES 1975 [id number]                 Emergency Contacts      (Rel.) Home Phone Work Phone Mobile Phone    Shirin Reyes (Spouse) [phone] -- [phone]    LlamasEdilia moe (Mother) [phone] -- --               Discharge Summary      Mat Rizo MD at 20 1319              Patient Name: Sadi Reyes  : 1975  MRN: [id number]    Date of Admission: 10/28/2020  Date of Discharge:  2020  Primary Care Physician: Christine Mullins APRN      Chief Complaint:   Chest Pain, Vomiting, Nausea, and Diarrhea      Discharge Diagnoses     Active Hospital Problems    Diagnosis  POA   • **VANESSA (acute kidney " injury) (CMS/HCC) [N17.9]  Yes   • Stage 3a chronic kidney disease [N18.31]  Yes   • Cervicogenic headache [R51.9]  Yes   • Hypertension [I10]  Yes   • Diastolic dysfunction without heart failure [I51.89]  Yes   • Acute gastritis without bleeding [K29.00]  Yes   • Acute duodenitis [K29.80]  Yes   • Non-cardiac chest pain [R07.89]  Yes   • Acute pyelonephritis [N10]  Yes   • Leucocytosis [D72.829]  Yes   • Dehydration [E86.0]  Yes   • Nausea vomiting and diarrhea [R11.2, R19.7]  Yes   • Acute abdominal pain [R10.9]  Yes      Resolved Hospital Problems   No resolved problems to display.        Hospital Course     Very pleasant 44yo gentleman who presented to ER c/o HA, N/V/D, chest pain, and was found to have bilateral pyelonephritis and VANESSA. Please see below for details of admission:    Chest pain: GI source, resolved, has some diastolic dysfunction on Echo, f/u with Card in a month    Acute gastritis and duodenitis: s/p EGD here, path pending, okay for dc home on BID PPI and BID Carafate, f/u with GI in a few weeks for path results and general f/u.    Bilateral Pyelonephritis: urine culture not helpful, has completed 5d course of IV Rocephin here    VANESSA/CKD3: followed by Renal, Cr at baseline now after IVFs, his Maxzide is now dc'd, okay to dc home, f/u with Renal in a couple weeks    Headache: followed by Neuro, imaging unremarkable, most likely cervicogenic, f/u with PCP, may consider outpt PT    HTN: off Maxzide now, amlodipine dose doubled to 10mg per day, f/u with PCP in a week      Day of Discharge     Subjective:  Feeling fine today. No more chest pain or HA. Voiding well. Tolerating regular diet after EGD earlier today.     Review of Systems    Physical Exam:  Temp:  [98.3 °F (36.8 °C)-98.9 °F (37.2 °C)] 98.9 °F (37.2 °C)  Heart Rate:  [52-61] 55  Resp:  [14-20] 16  BP: (121-144)/(71-91) 128/84  Body mass index is 22.66 kg/m².  Physical Exam  Vitals signs and nursing note reviewed.   Constitutional:        Appearance: Normal appearance.   Cardiovascular:      Rate and Rhythm: Normal rate and regular rhythm.   Pulmonary:      Effort: Pulmonary effort is normal.      Breath sounds: Normal breath sounds.   Abdominal:      General: Abdomen is flat. There is no distension.      Palpations: Abdomen is soft.   Skin:     General: Skin is warm and dry.      Capillary Refill: Capillary refill takes less than 2 seconds.   Neurological:      General: No focal deficit present.      Mental Status: He is alert and oriented to person, place, and time.     Consultants     Consult Orders (all) (From admission, onward)     Start     Ordered    10/31/20 0807  Inpatient Gastroenterology Consult  Once     Specialty:  Gastroenterology  Provider:  Alexx Julien MD    10/31/20 0806    10/29/20 1532  Inpatient Neurology Consult General  Once     Specialty:  Neurology  Provider:  Mat Hudson MD    10/29/20 1532    10/29/20 1530  Inpatient Cardiology Consult  Once     Specialty:  Cardiology  Provider:  Keith Lyman MD    10/29/20 1529    10/28/20 2037  Inpatient Nephrology Consult  Once     Specialty:  Nephrology  Provider:  Michael Perla MD    10/28/20 2036    10/28/20 1721  Nephrology (on -call MD unless specified)  Once     Specialty:  Nephrology  Provider:  Michael Perla MD    10/28/20 1720    10/28/20 1719  [facility] (on-call MD unless specified) Details  Once     Specialty:  Hospitalist  Provider:  Rony Parrish MD    10/28/20 1718              Procedures     Imaging Results (All)     Procedure Component Value Units Date/Time    US Gallbladder [[id number]] Collected: 10/31/20 1645     Updated: 10/31/20 1654    Narrative:      GALLBLADDER ULTRASOUND     HISTORY: Epigastric pain.     FINDINGS: The gallbladder contains several tiny echogenic foci that  appear to be adherent to the gallbladder wall without shadowing. The  findings suggest tiny gallbladder polyps. There is no wall thickening  and the common bile  duct is normal in caliber, measuring 4 mm. The  visualized liver and pancreas and right kidney are unremarkable as also  evaluated on recent CT scan.     CONCLUSION: Tiny echogenic foci adherent to the gallbladder wall without  shadowing and likely representing tiny gallbladder polyps. No evidence  of biliary obstruction.     This report was finalized on 10/31/2020 4:50 PM by Dr. Usman Kaplan M.D.       CT Head Without Contrast [[id number]] Collected: 10/30/20 1033     Updated: 10/30/20 1514    Narrative:      CT HEAD WITHOUT CONTRAST     HISTORY: Headache. Retro-orbital pain.     COMPARISON: CT head 03/26/2020.     FINDINGS: There is beam hardening artifact from a metallic fragment  involving the soft tissues of the scalp in the right parietal region.     The brain ventricles are symmetrical. There is no evidence of  intracranial hemorrhage, hydrocephalus or of abnormal extra-axial fluid.  No focal area of decreased attenuation to suggest acute infarction is  identified.       Impression:      No evidence of acute infarction or hemorrhage. The study is  hampered by beam hardening artifact from a metallic foreign object  involving the soft tissue of the scalp in the right parietal region.            Radiation dose reduction techniques were utilized, including automated  exposure control and exposure modulation based on body size.     This report was finalized on 10/30/2020 3:11 PM by Dr. Shahid Navarro M.D.       XR Spine Cervical 2 or 3 View [[id number]] Collected: 10/30/20 1317     Updated: 10/30/20 1339    Narrative:      TWO-VIEW CERVICAL SPINE     HISTORY: Neck pain.     FINDINGS:  The vertebral height and disc spaces are well-maintained and  the alignment appears normal. No significant abnormality is seen.     This report was finalized on 10/30/2020 1:36 PM by Dr. Usman Kaplan M.D.       US Renal Bilateral [[id number]] Collected: 10/29/20 1719     Updated: 10/29/20 1731    Narrative:      Examination:  Renal sonography     HISTORY: 45-year-old male with a history of acute renal insufficiency     FINDINGS: Sonographic evaluation of the kidneys was performed.  Comparison is made to a CT of the abdomen without contrast dated  10/28/2020. The right kidney measures 11.4 x 4.9 x 4.0 cm. The left  kidney measures 10.6 x 7.2 x 5.2 cm. Both kidneys demonstrate mild  increased echogenicity of the renal cortices. A trace amount of right  renal perinephric fluid is noted. There is no hydronephrosis or  hydroureter. The urinary bladder appears normal with visualization of a  left ureteral jet.        Impression:      1.There is no evidence for renal obstruction. Evidence of bilateral  medical renal disease is noted.     This report was finalized on 10/29/2020 5:27 PM by Dr. Ajit Lovelace M.D.       XR Chest 1 View [[id number]] Collected: 10/28/20 1737     Updated: 10/28/20 1740    Narrative:      PORTABLE CHEST X-RAY     HISTORY: Chest discomfort. Abdominal pain with vomiting and nausea.  Diarrhea for two weeks.     Portable chest x-ray is provided. Correlation: Chest x-ray 08/21/2017.      FINDINGS: The cardiomediastinal silhouette is normal. The lungs are  clear. The costophrenic sulci are dry and the bones appear normal. There  is no pneumothorax.       Impression:      Negative.     This report was finalized on 10/28/2020 5:37 PM by Dr. Mat Espinal M.D.       CT Abdomen Pelvis Without Contrast [[id number]] Collected: 10/28/20 1656     Updated: 10/28/20 1708    Narrative:      ABDOMEN AND PELVIS CT WITHOUT CONTRAST     HISTORY: Left lower quadrant abdominal pain.     TECHNIQUE: Abdomen and pelvis CT was performed without contrast. There  is no previous abdominal imaging for correlation.     Radiation dose reduction techniques were utilized, including automated  exposure control and exposure modulation based on body size.     FINDINGS: Visualized lung bases are clear. There is no pleural or  pericardial effusion.      The gallbladder is in situ and has a normal noncontrast CT appearance.  Parenchyma of the liver, pancreas, adrenals, spleen, and kidneys has a  normal noncontrast CT appearance. There is no hydronephrosis or ureter  nor is any renal or ureteral calculus. However, there is abnormal  perinephric stranding bilaterally. Urinary bladder appears normal.     The colon, appendix, small bowel, and the stomach appear normal. There  is no abdominal or pelvic lymphadenopathy. No free fluid is present.  Advanced degenerative disc change is observed at L5-S1.       Impression:      Abnormal bilateral perinephric stranding without  hydronephrosis or evidence of stone disease may represent pyelonephritis  or other medical renal disease. Abdomen and pelvis CT is otherwise  negative.     This report was finalized on 10/28/2020 5:04 PM by Dr. Mat Espinal M.D.             Pertinent Labs     Results from last 7 days   Lab Units 10/31/20  0419 10/29/20  0529 10/28/20  1438   WBC 10*3/mm3 7.21 10.50 12.96*   HEMOGLOBIN g/dL 13.4 14.8 16.4   PLATELETS 10*3/mm3 190 196 212     Results from last 7 days   Lab Units 11/02/20  0447 11/01/20  0543 10/31/20  0419 10/30/20  1238   SODIUM mmol/L 142 139 139 140   POTASSIUM mmol/L 3.7 3.9 3.6 3.4*   CHLORIDE mmol/L 105 103 104 104   CO2 mmol/L 28.2 28.7 27.6 26.1   BUN mg/dL 11 10 10 12   CREATININE mg/dL 1.20 1.17 1.20 1.41*   GLUCOSE mg/dL 95 91 81 84   Estimated Creatinine Clearance: 82.7 mL/min (by C-G formula based on SCr of 1.2 mg/dL).  Results from last 7 days   Lab Units 11/01/20  0543 10/29/20  0529 10/28/20  1438   ALBUMIN g/dL 3.90 3.80 4.60   BILIRUBIN mg/dL 0.4 0.3 0.5   ALK PHOS U/L 44 44 55   AST (SGOT) U/L 15 20 27   ALT (SGPT) U/L 25 25 40     Results from last 7 days   Lab Units 11/02/20  0447 11/01/20  0543 10/31/20  0419 10/30/20  1238 10/29/20  0529 10/28/20  1438   CALCIUM mg/dL 9.4 9.3 8.6 8.7 8.7 9.5   ALBUMIN g/dL  --  3.90  --   --  3.80 4.60     Results from last 7  days   Lab Units 11/01/20  0543 10/28/20  1438   LIPASE U/L 37 17     Results from last 7 days   Lab Units 11/01/20  0543 10/30/20  1238 10/28/20  1438   CK TOTAL U/L  --   --  175   TROPONIN T ng/mL <0.010 <0.010 <0.010           Invalid input(s): LDLCALC  Results from last 7 days   Lab Units 10/28/20  2233 10/28/20  1442   BLOODCX  No growth at 4 days  No growth at 4 days  --    URINECX   --  No growth       Test Results Pending at Discharge     Pending Labs     Order Current Status    Tissue Pathology Exam Collected (11/02/20 1145)    Blood Culture - Blood, Arm, Left Preliminary result    Blood Culture - Blood, Arm, Left Preliminary result          Discharge Details        Discharge Medications      New Medications      Instructions Start Date   pantoprazole 40 MG EC tablet  Commonly known as: PROTONIX   40 mg, Oral, 2 Times Daily Before Meals      sucralfate 1 g tablet  Commonly known as: CARAFATE   1 g, Oral, 2 Times Daily Before Meals         Changes to Medications      Instructions Start Date   amLODIPine 10 MG tablet  Commonly known as: NORVASC  What changed:   · medication strength  · how much to take   10 mg, Oral, Daily   Start Date: November 3, 2020        Continue These Medications      Instructions Start Date   atorvastatin 20 MG tablet  Commonly known as: LIPITOR   20 mg, Oral, Daily      traMADol 50 MG tablet  Commonly known as: ULTRAM   50 mg, Oral, Every 6 Hours PRN         Stop These Medications    propranolol LA 60 MG 24 hr capsule  Commonly known as: INDERAL LA     triamterene-hydrochlorothiazide 37.5-25 MG per tablet  Commonly known as: MAXZIDE-25            Allergies   Allergen Reactions   • Inderal La [Propranolol] Diarrhea and Nausea And Vomiting         Discharge Disposition:  Home or Self Care    Discharge Diet:  No active diet order      Discharge Activity:   as tolerated    CODE STATUS:    Code Status and Medical Interventions:   Ordered at: 10/28/20 2038     Code Status:    CPR      Medical Interventions (Level of Support Prior to Arrest):    Full       Future Appointments   Date Time Provider Department Center   1/8/2021  9:00 AM Rony Arrieta APRN [facility]   2/4/2021 10:00 AM Christine Mullins APRN [facility] None     Additional Instructions for the Follow-ups that You Need to Schedule     Discharge Follow-up with PCP   As directed       Currently Documented PCP:    Christine Mullins APRN    PCP Phone Number:    [phone]     Follow Up Details: Harpreet NP (PCP) in 1week         Discharge Follow-up with Specified Provider: Dr. Gonzalez (Renal); 2 Weeks   As directed      To: Dr. Gonzalez (Renal)    Follow Up: 2 Weeks         Discharge Follow-up with Specified Provider: Dr. Romero (GI); 3 Weeks   As directed      To: Dr. Romero (GI)    Follow Up: 3 Weeks         Discharge Follow-up with Specified Provider: Dr. Qureshi (Card); 1 Month   As directed      To: Dr. Qureshi (Card)    Follow Up: 1 Month           Follow-up Information     Paulie Barksdale MD Follow up in 1 month(s).    Specialty: General Surgery  Why: Follow up regarding galbladder polyp seen on imaging  Contact information:  [address] [place] [address]  [phone]             Christine Mullins APRN .    Specialty: Family Medicine  Why: Harpreet NP (PCP) in 1week  Contact information:  [address] [place] [address]  [phone]                   Additional Instructions for the Follow-ups that You Need to Schedule     Discharge Follow-up with PCP   As directed       Currently Documented PCP:    Christine Mullins APRN    PCP Phone Number:    [phone]     Follow Up Details: Harpreet NP (PCP) in 1week         Discharge Follow-up with Specified Provider: Dr. Gonzalez (Renal); 2 Weeks   As directed      To: Dr. Gonzalez (Renal)    Follow Up: 2 Weeks         Discharge Follow-up with Specified Provider: Dr. Romero (GI); 3 Weeks   As directed      To: Dr. Romero (GI)     Follow Up: 3 Weeks         Discharge Follow-up with Specified Provider: Dr. Qureshi (Card); 1 Month   As directed      To: Dr. Qureshi (Card)    Follow Up: 1 Month           Time Spent on Discharge:  Greater than 30 minutes      Mat Rizo MD  [facility]  11/02/20  13:19 EST                Electronically signed by Mat Rizo MD at 11/02/20 1964

## 2020-11-02 NOTE — PROGRESS NOTES
NEPHROLOGY PROGRESS NOTE    PATIENT IDENTIFICATION:   Name:  Sadi Reyes      MRN:  [id number]     45 y.o.  male             Reason for visit: Samanta    SUBJECTIVE:   Seen and examined.  Denies shortness of air or chest pain.  Feeling better.  No new issues overnight    OBJECTIVE:  Vitals:    11/01/20 1857 11/01/20 2318 11/02/20 0547 11/02/20 0703   BP: 144/91 125/71  125/76   BP Location: Left arm Left arm  Left arm   Patient Position: Sitting Lying  Lying   Pulse: 61 53  52   Resp: 20 20 20   Temp: 98.3 °F (36.8 °C) 98.6 °F (37 °C)  98.6 °F (37 °C)   TempSrc: Oral Oral  Oral   SpO2: 99% 96%  96%   Weight:   73.7 kg (162 lb 6.4 oz)    Height:               Body mass index is 22.66 kg/m².    Intake/Output Summary (Last 24 hours) at 11/2/2020 0757  Last data filed at 11/2/2020 0703  Gross per 24 hour   Intake --   Output 450 ml   Net -450 ml         Exam:  GEN:  No distress, appears stated age  EYES:   Anicteric sclera  ENT:    External ears/nose normal, MM are moist  NECK:  No adenopathy, JVP none  LUNGS: Normal chest on inspection; not labored  CV:  Normal S1S2, without murmur  ABD:  Non-tender, non-distended, no hepatosplenomegaly, +BS  EXT:  No edema; no cyanosis; clubbing    Scheduled meds:  amLODIPine, 10 mg, Oral, Daily  atorvastatin, 20 mg, Oral, Daily  pantoprazole, 40 mg, Oral, BID AC  propranolol LA, 60 mg, Oral, Daily  sucralfate, 1 g, Oral, 4x Daily AC & at Bedtime      IV meds:                           Data Review:    Results from last 7 days   Lab Units 11/02/20  0447 11/01/20  0543 10/31/20  0419  10/29/20  0529 10/28/20  1438   SODIUM mmol/L 142 139 139   < > 140 138   POTASSIUM mmol/L 3.7 3.9 3.6   < > 3.6 3.9   CHLORIDE mmol/L 105 103 104   < > 103 100   CO2 mmol/L 28.2 28.7 27.6   < > 25.0 28.3   BUN mg/dL 11 10 10   < > 21* 25*   CREATININE mg/dL 1.20 1.17 1.20   < > 2.19* 2.36*   CALCIUM mg/dL 9.4 9.3 8.6   < > 8.7 9.5   BILIRUBIN mg/dL  --  0.4  --   --  0.3 0.5   ALK PHOS U/L  --  44  --    --  44 55   ALT (SGPT) U/L  --  25  --   --  25 40   AST (SGOT) U/L  --  15  --   --  20 27   GLUCOSE mg/dL 95 91 81   < > 94 122*    < > = values in this interval not displayed.       Estimated Creatinine Clearance: 82.7 mL/min (by C-G formula based on SCr of 1.2 mg/dL).          Results from last 7 days   Lab Units 10/31/20  0419 10/29/20  0529 10/28/20  1438   WBC 10*3/mm3 7.21 10.50 12.96*   HEMOGLOBIN g/dL 13.4 14.8 16.4   PLATELETS 10*3/mm3 190 196 212                   ASSESSMENT:     SAMANTA (acute kidney injury) (CMS/HCC)    Acute nephritis    Acute pyelonephritis    Leucocytosis    Dehydration    Nausea vomiting and diarrhea    Acute abdominal pain             - Samanta on CKD III: BLN CR 1 mg/dl. might be due to NSAID's and Maxzide.  Renal ultrasound showed medical renal disease.  UA is bland.  Serum creatinine is stable.  Okay to be discharged from kidney standpoint after his endoscopy.     - CKD III with BLN CR 1 mg/dl  - HTN: hold Maxzide  - headache: get Ct head      Juan Gonzalez MD  11/2/2020    07:57 EST

## 2020-11-03 ENCOUNTER — TRANSITIONAL CARE MANAGEMENT TELEPHONE ENCOUNTER (OUTPATIENT)
Dept: CALL CENTER | Facility: HOSPITAL | Age: 45
End: 2020-11-03

## 2020-11-03 LAB
LAB AP CASE REPORT: NORMAL
PATH REPORT.FINAL DX SPEC: NORMAL
PATH REPORT.GROSS SPEC: NORMAL

## 2020-11-03 NOTE — OUTREACH NOTE
Prep Survey      Responses   [facility] facility patient discharged from?  [place]   Is LACE score < 7 ?  No   Eligibility  [facility]   Date of Admission  10/28/20   Date of Discharge  11/02/20   Discharge Disposition  Home or Self Care   Discharge diagnosis  VANESSA, pyelonephritis, chest pain - GI source   Does the patient have one of the following disease processes/diagnoses(primary or secondary)?  Other   Does the patient have Home health ordered?  No   Prep survey completed?  Yes          Maria Alejandra Hunt RN

## 2020-11-03 NOTE — OUTREACH NOTE
Call Center TCM Note      Responses   [facility] patient discharged from?  [place]   Does the patient have one of the following disease processes/diagnoses(primary or secondary)?  Other   TCM attempt successful?  No   Revoked Reason  Other [Patient has already contacted office]          Dayana Evans LPN    11/3/2020, 09:25 EST

## 2020-11-04 ENCOUNTER — OFFICE VISIT (OUTPATIENT)
Dept: INTERNAL MEDICINE | Facility: CLINIC | Age: 45
End: 2020-11-04

## 2020-11-04 VITALS
HEART RATE: 67 BPM | HEIGHT: 70 IN | DIASTOLIC BLOOD PRESSURE: 92 MMHG | WEIGHT: 187.6 LBS | RESPIRATION RATE: 16 BRPM | SYSTOLIC BLOOD PRESSURE: 131 MMHG | TEMPERATURE: 97.7 F | OXYGEN SATURATION: 100 % | BODY MASS INDEX: 26.86 KG/M2

## 2020-11-04 DIAGNOSIS — R51.9 INTRACTABLE EPISODIC HEADACHE, UNSPECIFIED HEADACHE TYPE: Primary | ICD-10-CM

## 2020-11-04 PROCEDURE — 99213 OFFICE O/P EST LOW 20 MIN: CPT | Performed by: NURSE PRACTITIONER

## 2020-11-04 NOTE — PROGRESS NOTES
Subjective     Sadi Reyes is a 45 y.o. male.         Presents for 2-week follow-up for headaches.  He was previously started on propanolol 2 weeks ago to see if this could help his chronic headaches.  He has been dealing with the frequent headache now for over a year.  She is new to me, normally followed by Christine Mullins who is currently out on leave.  Patient reports that he did not tolerate the propanolol made him feel weird and he also had double vision.  Patient was recently admitted to the hospital for renal dysfunction his sees hospital follow-up next week) and propranolol was added to his allergy list because of his reaction.  He describes the headaches as pressure behind his R eye that sometimes goes to the L side.  He reports he has 10-12 headache days a month.  He is due to see neurology for the first time in 2 months.  Neurology did see him while he was admitted to the hospital-cervical spine x-ray was completed and was normal.  He has used Tylenol and NSAIDs in the past to help control headaches but he would only get about 3 hours of relief.  Due to his recently decreased kidney function he was told not to use NSAIDs currently.    Headache   This is a chronic problem. The current episode started more than 1 year ago. The problem occurs intermittently. The problem has been waxing and waning. The pain is located in the right unilateral region. The pain does not radiate. Associated symptoms include nausea and photophobia. Pertinent negatives include no abdominal pain, abnormal behavior, back pain, blurred vision, fever, loss of balance, neck pain, numbness, scalp tenderness, sinus pressure, visual change, vomiting or weakness.        The following portions of the patient's history were reviewed and updated as appropriate: allergies, current medications, past social history and problem list.    Past Medical History:   Diagnosis Date   • Carpal tunnel syndrome    • Hypertension    • Palpitations   "        Current Outpatient Medications:   •  amLODIPine (NORVASC) 10 MG tablet, Take 1 tablet by mouth Daily., Disp: 30 tablet, Rfl: 0  •  atorvastatin (LIPITOR) 20 MG tablet, Take 20 mg by mouth Daily., Disp: , Rfl:   •  pantoprazole (PROTONIX) 40 MG EC tablet, Take 1 tablet by mouth 2 (Two) Times a Day Before Meals., Disp: 60 tablet, Rfl: 0  •  sucralfate (CARAFATE) 1 g tablet, Take 1 tablet by mouth 2 (Two) Times a Day Before Meals., Disp: 60 tablet, Rfl: 0  •  traMADol (ULTRAM) 50 MG tablet, Take 1 tablet by mouth Every 6 (Six) Hours As Needed for Moderate Pain . (Patient taking differently: Take 50 mg by mouth Every 6 (Six) Hours As Needed for Moderate Pain . Pt is taking as needed), Disp: 20 tablet, Rfl: 0    Allergies   Allergen Reactions   • Inderal La [Propranolol] Diarrhea and Nausea And Vomiting       Review of Systems   Constitutional: Negative for fever.   HENT: Negative for sinus pressure.    Eyes: Positive for photophobia. Negative for blurred vision.   Gastrointestinal: Positive for nausea. Negative for abdominal pain and vomiting.   Musculoskeletal: Negative for back pain and neck pain.   Neurological: Positive for headaches. Negative for weakness, numbness and loss of balance.       Objective     /92 (BP Location: Right arm, Patient Position: Sitting, Cuff Size: Adult)   Pulse 67   Temp 97.7 °F (36.5 °C) (Oral)   Resp 16   Ht 177.8 cm (70\")   Wt 85.1 kg (187 lb 9.6 oz)   SpO2 100%   BMI 26.92 kg/m²   Wt Readings from Last 3 Encounters:   11/04/20 85.1 kg (187 lb 9.6 oz)   11/02/20 73.7 kg (162 lb 6.4 oz)   10/22/20 83.5 kg (184 lb)     Temp Readings from Last 3 Encounters:   11/04/20 97.7 °F (36.5 °C) (Oral)   11/02/20 98.9 °F (37.2 °C) (Oral)   10/19/20 97.8 °F (36.6 °C) (Tympanic)     BP Readings from Last 3 Encounters:   11/04/20 131/92   11/02/20 128/84   10/22/20 130/82     Pulse Readings from Last 3 Encounters:   11/04/20 67   11/02/20 55   10/19/20 70       Physical Exam  Vitals " signs reviewed.   Constitutional:       Appearance: He is well-developed.   HENT:      Head: Normocephalic and atraumatic.   Eyes:      Extraocular Movements: Extraocular movements intact.      Right eye: Normal extraocular motion and no nystagmus.      Left eye: Normal extraocular motion and no nystagmus.      Conjunctiva/sclera:      Right eye: Right conjunctiva is not injected.      Left eye: Left conjunctiva is not injected.      Pupils: Pupils are equal, round, and reactive to light.   Cardiovascular:      Rate and Rhythm: Normal rate and regular rhythm.      Heart sounds: Normal heart sounds. No murmur.   Pulmonary:      Effort: Pulmonary effort is normal. No respiratory distress.      Breath sounds: Normal breath sounds.   Musculoskeletal: Normal range of motion.   Skin:     General: Skin is warm and dry.   Neurological:      Mental Status: He is alert.   Psychiatric:         Behavior: Behavior normal.         Thought Content: Thought content normal.         Judgment: Judgment normal.         Assessment/Plan     Diagnoses and all orders for this visit:    1. Intractable episodic headache, unspecified headache type (Primary)  -     Cancel: Ambulatory Referral to Physical Therapy Evaluate and treat  -     Ambulatory Referral to Physical Therapy Evaluate and treat    We will try physical therapy to see if this helps his headaches at all.  I was hesitant on starting prophylaxis medication due to his sensitivity to propanolol and also with his upcoming neurology appointment in 2 months.    Also given Ubrelvy samples to try, instructions on administration given.  He will contact the office and let us know this medication works and we can send him in a prescription.     He will follow-up in a week for his hospital admission for renal dysfunction.    Answers for HPI/ROS submitted by the patient on 11/4/2020   What is the primary reason for your visit?: Other  Please describe your symptoms.: Follow up on head  pains  Have you had these symptoms before?: Yes  How long have you been having these symptoms?: Greater than 2 weeks  Please describe any probable cause for these symptoms. : N/a

## 2020-11-09 ENCOUNTER — PATIENT MESSAGE (OUTPATIENT)
Dept: INTERNAL MEDICINE | Facility: CLINIC | Age: 45
End: 2020-11-09

## 2020-11-10 ENCOUNTER — READMISSION MANAGEMENT (OUTPATIENT)
Dept: CALL CENTER | Facility: HOSPITAL | Age: 45
End: 2020-11-10

## 2020-11-10 ENCOUNTER — OFFICE VISIT (OUTPATIENT)
Dept: GASTROENTEROLOGY | Facility: CLINIC | Age: 45
End: 2020-11-10

## 2020-11-10 VITALS — WEIGHT: 184.2 LBS | BODY MASS INDEX: 25.79 KG/M2 | HEIGHT: 71 IN

## 2020-11-10 DIAGNOSIS — R68.81 EARLY SATIETY: Primary | ICD-10-CM

## 2020-11-10 PROCEDURE — 99213 OFFICE O/P EST LOW 20 MIN: CPT | Performed by: INTERNAL MEDICINE

## 2020-11-10 RX ORDER — PANTOPRAZOLE SODIUM 40 MG/1
40 TABLET, DELAYED RELEASE ORAL
Qty: 60 TABLET | Refills: 2 | Status: SHIPPED | OUTPATIENT
Start: 2020-11-10 | End: 2021-03-01

## 2020-11-10 NOTE — OUTREACH NOTE
Medical Week 2 Survey      Responses   [facility] patient discharged from?  [place]   Does the patient have one of the following disease processes/diagnoses(primary or secondary)?  Other   Week 2 attempt successful?  Yes   Call start time  1528   Call end time  1530   Medication alerts for this patient  no new medication were  added   Meds reviewed with patient/caregiver?  Yes   Is the patient taking all medications as directed (includes completed medication regime)?  Yes   Comments regarding appointments  has kept his appointment   Has the patient kept scheduled appointments due by today?  Yes   What is the patient's perception of their health status since discharge?  Improving   Week 2 Call Completed?  Yes   Wrap up additional comments  has seen provider today, one meds while in hospital will take forn one month and the other for three monthes. to fu with neuro 12/8 for headaches          Annette Posadas RN

## 2020-11-10 NOTE — PROGRESS NOTES
Chief Complaint   Patient presents with   • Hospital Follow Up Visit       Sadi Reyes is a  45 y.o. male here for a follow up visit for erosive gastritis as a cause for epigastric pain and noncardiac chest pain    Patient was admitted last week for noncardiac chest pain and epigastric pain.  EGD revealed erosive gastritis in the gastric fundus and body as a cause for his symptoms.  We placed him on twice daily PPI and twice daily Carafate he has been taking this faithfully for 1 week and he is feeling much better.  Minimal chest pain minimal epigastric pain.  No nausea, vomiting or dyspepsia.    He is 45 years old he is not due for his colonoscopy yet  He is here to get a refill on his Protonix      Past Medical History:   Diagnosis Date   • Carpal tunnel syndrome    • Hypertension    • Palpitations        Past Surgical History:   Procedure Laterality Date   • BUNIONECTOMY     • ENDOSCOPY N/A 11/2/2020    Procedure: ESOPHAGOGASTRODUODENOSCOPY with biopsies;  Surgeon: Tono Romero MD;  Location: [facility] ENDOSCOPY;  Service: Gastroenterology;  Laterality: N/A;  Pre: epigastric pain  Post: duodenitis, gastritis, hiatal hernia   • FOOT SURGERY     • HERNIA REPAIR         Scheduled Meds:    Continuous Infusions:No current facility-administered medications for this visit.       PRN Meds:.    Allergies   Allergen Reactions   • Inderal La [Propranolol] Diarrhea and Nausea And Vomiting       Social History     Socioeconomic History   • Marital status:      Spouse name: Not on file   • Number of children: Not on file   • Years of education: Not on file   • Highest education level: Not on file   Tobacco Use   • Smoking status: Never Smoker   • Smokeless tobacco: Never Used   Substance and Sexual Activity   • Alcohol use: Yes     Comment: seldom -  1 x weekly   • Drug use: Yes     Frequency: 1.0 times per week     Types: Marijuana   • Sexual activity: Yes     Partners: Female       Family History   Problem  Relation Age of Onset   • Hypertension Mother    • No Known Problems Father    • No Known Problems Sister        Review of Systems   Gastrointestinal: Negative for abdominal distention, abdominal pain, anal bleeding, constipation and nausea.   All other systems reviewed and are negative.      There were no vitals filed for this visit.    Physical Exam  Vitals signs and nursing note reviewed.   Constitutional:       Appearance: He is well-developed.   HENT:      Head: Normocephalic and atraumatic.   Eyes:      Conjunctiva/sclera: Conjunctivae normal.   Neck:      Musculoskeletal: Normal range of motion.      Trachea: No tracheal deviation.   Cardiovascular:      Rate and Rhythm: Normal rate and regular rhythm.   Pulmonary:      Effort: Pulmonary effort is normal. No respiratory distress.      Breath sounds: Normal breath sounds.   Abdominal:      General: Bowel sounds are normal. There is no distension.      Palpations: Abdomen is soft. There is no mass.      Tenderness: There is no abdominal tenderness. There is no guarding or rebound.   Musculoskeletal: Normal range of motion.   Skin:     General: Skin is warm and dry.   Neurological:      Mental Status: He is alert and oriented to person, place, and time.   Psychiatric:         Judgment: Judgment normal.         Problem list    Epigastric pain  Noncardiac chest pain  Erosive gastritis in the body and fundus      Assessment/Plan    Continue Protonix 40 mg p.o. twice daily for 3 months  Continue Carafate 1 g p.o. twice daily for another 3 weeks  When he is off all medications if his symptoms return he will go back to 40 mg of Protonix once a day  He will call me in 2 years to schedule his screening colonoscopy  Antireflux diet discussed

## 2020-11-11 ENCOUNTER — OFFICE VISIT (OUTPATIENT)
Dept: INTERNAL MEDICINE | Facility: CLINIC | Age: 45
End: 2020-11-11

## 2020-11-11 VITALS
WEIGHT: 187 LBS | HEART RATE: 77 BPM | DIASTOLIC BLOOD PRESSURE: 80 MMHG | BODY MASS INDEX: 26.18 KG/M2 | HEIGHT: 71 IN | OXYGEN SATURATION: 99 % | SYSTOLIC BLOOD PRESSURE: 134 MMHG | TEMPERATURE: 98.7 F

## 2020-11-11 DIAGNOSIS — Z09 HOSPITAL DISCHARGE FOLLOW-UP: Primary | ICD-10-CM

## 2020-11-11 DIAGNOSIS — R11.2 NAUSEA VOMITING AND DIARRHEA: ICD-10-CM

## 2020-11-11 DIAGNOSIS — N10 ACUTE PYELONEPHRITIS: ICD-10-CM

## 2020-11-11 DIAGNOSIS — I10 ESSENTIAL HYPERTENSION: ICD-10-CM

## 2020-11-11 DIAGNOSIS — K29.00 ACUTE GASTRITIS WITHOUT HEMORRHAGE, UNSPECIFIED GASTRITIS TYPE: ICD-10-CM

## 2020-11-11 DIAGNOSIS — R19.7 NAUSEA VOMITING AND DIARRHEA: ICD-10-CM

## 2020-11-11 DIAGNOSIS — N17.9 AKI (ACUTE KIDNEY INJURY) (HCC): ICD-10-CM

## 2020-11-11 DIAGNOSIS — R51.9 ACUTE NONINTRACTABLE HEADACHE, UNSPECIFIED HEADACHE TYPE: ICD-10-CM

## 2020-11-11 DIAGNOSIS — K29.80 ACUTE DUODENITIS: ICD-10-CM

## 2020-11-11 PROCEDURE — 99214 OFFICE O/P EST MOD 30 MIN: CPT | Performed by: NURSE PRACTITIONER

## 2020-11-11 NOTE — PROGRESS NOTES
Transitional Care Follow Up Visit  Subjective     Sadi Reyes is a 45 y.o. male who presents for a transitional care management visit.    Within 48 business hours after discharge our office contacted him via telephone to coordinate his care and needs.      I reviewed and discussed the details of that call along with the discharge summary, hospital problems, inpatient lab results, inpatient diagnostic studies, and consultation reports with Sadi.     Current outpatient and discharge medications have been reconciled for the patient.  Reviewed by: CHARI Feng      Date of TCM Phone Call 11/2/2020   [facility]   Date of Admission 10/28/2020   Date of Discharge 11/2/2020   Discharge Disposition Home or Self Care     Risk for Readmission (LACE) Score: 9 (11/2/2020  6:00 AM)      History of Present Illness   Course During Hospital Stay: Presented to ER c/o HA, N/V/D, chest pain, and was found to have bilateral pyelonephritis and VANESSA.     Chest pain: was thought to be from GI source, resolved, has some diastolic dysfunction on Echo. He is due for f/u with Cards in a month.     Acute gastritis and duodenitis found: on EGD in hospital-path was negative. Pt did have f/u with GI yesterday.Started on BID PPI and BID Carafate that he is to continue. He is to continue protonix 40 mg twice daily for 3 months, then once daily, and carafate twice daily.    Bilateral Pyelonephritis: found on CT, urine culture not helpful. He did complet 5d course of IV Rocephin in hospital.     VANESSA/CKD3: followed by Renal, Cr at it's highest was 2.36, was 1.2 at discharge.He was given IVFs and Maxzide was stopped. Due to f/u with Renal in a few weeks (Dec. 2).     Headache: Neuro was consulted in hospital, imaging unremarkable, most likely cervicogenic. He was referred to outpt PT last week and has first session today. Also given Ubrelvy samples by me last week after his discharge which he reports has helped quite a  bit.    HTN: taken off Maxzide now, amlodipine dose doubled to 10mg per day. Well controlled BP today.       The following portions of the patient's history were reviewed and updated as appropriate: allergies, current medications, past family history, past medical history, past social history, past surgical history and problem list.    Review of Systems   Constitutional: Negative for fatigue and fever.   Respiratory: Negative for shortness of breath.    Cardiovascular: Negative for chest pain, palpitations and leg swelling.   Gastrointestinal: Negative for abdominal distention, abdominal pain, diarrhea, nausea and vomiting.   Skin: Negative for color change.   Neurological: Positive for headaches.       Objective   Physical Exam  Vitals signs reviewed.   Constitutional:       Appearance: He is well-developed.   HENT:      Head: Normocephalic and atraumatic.   Cardiovascular:      Rate and Rhythm: Normal rate and regular rhythm.      Heart sounds: Normal heart sounds. No murmur.   Pulmonary:      Effort: Pulmonary effort is normal. No respiratory distress.      Breath sounds: Normal breath sounds.   Chest:      Chest wall: Tenderness (L axilla, L rib region) present.       Musculoskeletal: Normal range of motion.   Skin:     General: Skin is warm and dry.   Neurological:      Mental Status: He is alert.   Psychiatric:         Behavior: Behavior normal.         Thought Content: Thought content normal.         Judgment: Judgment normal.         Assessment/Plan   Diagnoses and all orders for this visit:    1. Hospital discharge follow-up (Primary)    2. VANESSA (acute kidney injury) (CMS/[facility])  Comments:  normalized by discharge 9 days ago, recheck CMP today  Orders:  -     Comprehensive Metabolic Panel    3. Essential hypertension  Comments:  well controlled with stoppage of maxide (due to VANESSA) and increase in amlodipine from 5 to 10 mg, continue same regimen  Orders:  -     CBC & Differential  -     Urinalysis With Culture  If Indicated - Urine, Clean Catch    4. Acute nonintractable headache, unspecified headache type  Comments:  improved with ubrelvy samples, will send Rx, copay card given, seeing neuro in 2 months  Orders:  -     ubrogepant (ubrogepant) 50 MG tablet; Take 1 tablet by mouth As Needed (headache).  Dispense: 10 tablet; Refill: 1    5. Acute gastritis without hemorrhage, unspecified gastritis type  Comments:  improved with protonix and sucralfate, continue those meds, f/u with GI yesterday went well-biopsy was negative    6. Acute duodenitis    7. Nausea vomiting and diarrhea  Comments:  resolved    8. Acute pyelonephritis  Comments:  discovered on CT, treated with abx in hospital, lower abdominal pain has resolved, will check UA today    Pt doing very well since hospital discharge.    He has had sharp pain to his L axilla/chest wall area for the last 3 days. Denies chest pain, SOB, fevers. He can apply heat and continue to monitor. He was tender to that area on exam.    Return for routine f/u as scheduled.

## 2020-11-12 LAB
ALBUMIN SERPL-MCNC: 4.4 G/DL (ref 3.5–5.2)
ALBUMIN/GLOB SERPL: 1.8 G/DL
ALP SERPL-CCNC: 51 U/L (ref 39–117)
ALT SERPL-CCNC: 38 U/L (ref 1–41)
APPEARANCE UR: CLEAR
AST SERPL-CCNC: 18 U/L (ref 1–40)
BACTERIA #/AREA URNS HPF: NORMAL /HPF
BASOPHILS # BLD AUTO: 0.05 10*3/MM3 (ref 0–0.2)
BASOPHILS NFR BLD AUTO: 0.8 % (ref 0–1.5)
BILIRUB SERPL-MCNC: 0.4 MG/DL (ref 0–1.2)
BILIRUB UR QL STRIP: NEGATIVE
BUN SERPL-MCNC: 15 MG/DL (ref 6–20)
BUN/CREAT SERPL: 15 (ref 7–25)
CALCIUM SERPL-MCNC: 9.3 MG/DL (ref 8.6–10.5)
CHLORIDE SERPL-SCNC: 109 MMOL/L (ref 98–107)
CO2 SERPL-SCNC: 25.4 MMOL/L (ref 22–29)
COLOR UR: YELLOW
CREAT SERPL-MCNC: 1 MG/DL (ref 0.76–1.27)
EOSINOPHIL # BLD AUTO: 0.09 10*3/MM3 (ref 0–0.4)
EOSINOPHIL NFR BLD AUTO: 1.4 % (ref 0.3–6.2)
EPI CELLS #/AREA URNS HPF: NORMAL /HPF (ref 0–10)
ERYTHROCYTE [DISTWIDTH] IN BLOOD BY AUTOMATED COUNT: 13.1 % (ref 12.3–15.4)
GLOBULIN SER CALC-MCNC: 2.4 GM/DL
GLUCOSE SERPL-MCNC: 84 MG/DL (ref 65–99)
GLUCOSE UR QL: NEGATIVE
HCT VFR BLD AUTO: 45.7 % (ref 37.5–51)
HGB BLD-MCNC: 15.2 G/DL (ref 13–17.7)
HGB UR QL STRIP: NEGATIVE
IMM GRANULOCYTES # BLD AUTO: 0.02 10*3/MM3 (ref 0–0.05)
IMM GRANULOCYTES NFR BLD AUTO: 0.3 % (ref 0–0.5)
KETONES UR QL STRIP: NEGATIVE
LEUKOCYTE ESTERASE UR QL STRIP: NEGATIVE
LYMPHOCYTES # BLD AUTO: 2.32 10*3/MM3 (ref 0.7–3.1)
LYMPHOCYTES NFR BLD AUTO: 35.3 % (ref 19.6–45.3)
MCH RBC QN AUTO: 31.1 PG (ref 26.6–33)
MCHC RBC AUTO-ENTMCNC: 33.3 G/DL (ref 31.5–35.7)
MCV RBC AUTO: 93.5 FL (ref 79–97)
MICRO URNS: NORMAL
MICRO URNS: NORMAL
MONOCYTES # BLD AUTO: 0.42 10*3/MM3 (ref 0.1–0.9)
MONOCYTES NFR BLD AUTO: 6.4 % (ref 5–12)
MUCOUS THREADS URNS QL MICRO: PRESENT /HPF
NEUTROPHILS # BLD AUTO: 3.68 10*3/MM3 (ref 1.7–7)
NEUTROPHILS NFR BLD AUTO: 55.8 % (ref 42.7–76)
NITRITE UR QL STRIP: NEGATIVE
NRBC BLD AUTO-RTO: 0 /100 WBC (ref 0–0.2)
PH UR STRIP: 6 [PH] (ref 5–7.5)
PLATELET # BLD AUTO: 278 10*3/MM3 (ref 140–450)
POTASSIUM SERPL-SCNC: 4.3 MMOL/L (ref 3.5–5.2)
PROT SERPL-MCNC: 6.8 G/DL (ref 6–8.5)
PROT UR QL STRIP: NEGATIVE
RBC # BLD AUTO: 4.89 10*6/MM3 (ref 4.14–5.8)
RBC #/AREA URNS HPF: NORMAL /HPF (ref 0–2)
SODIUM SERPL-SCNC: 144 MMOL/L (ref 136–145)
SP GR UR: 1.02 (ref 1–1.03)
URINALYSIS REFLEX: NORMAL
UROBILINOGEN UR STRIP-MCNC: 0.2 MG/DL (ref 0.2–1)
WBC # BLD AUTO: 6.58 10*3/MM3 (ref 3.4–10.8)
WBC #/AREA URNS HPF: NORMAL /HPF (ref 0–5)

## 2020-11-16 DIAGNOSIS — R51.9 INTRACTABLE EPISODIC HEADACHE, UNSPECIFIED HEADACHE TYPE: ICD-10-CM

## 2020-11-16 RX ORDER — PROPRANOLOL HCL 60 MG
CAPSULE, EXTENDED RELEASE 24HR ORAL
Qty: 30 CAPSULE | Refills: 1 | Status: SHIPPED | OUTPATIENT
Start: 2020-11-16 | End: 2020-12-11

## 2020-11-20 ENCOUNTER — READMISSION MANAGEMENT (OUTPATIENT)
Dept: CALL CENTER | Facility: HOSPITAL | Age: 45
End: 2020-11-20

## 2020-11-20 NOTE — OUTREACH NOTE
Medical Week 3 Survey      Responses   [facility] patient discharged from?  [place]   Does the patient have one of the following disease processes/diagnoses(primary or secondary)?  Other   Week 3 attempt successful?  Yes   Call start time  1611   Call end time  1612   Discharge diagnosis  VANESSA, pyelonephritis, chest pain - GI source   Meds reviewed with patient/caregiver?  Yes   Is the patient taking all medications as directed (includes completed medication regime)?  Yes   Has the patient kept scheduled appointments due by today?  Yes   What is the patient's perception of their health status since discharge?  Returned to baseline/stable   Is the patient/caregiver able to teach back signs and symptoms related to disease process for when to call PCP?  Yes   Week 3 Call Completed?  Yes   Graduated  Yes   Did the patient feel the follow up calls were helpful during their recovery period?  Yes   Graduated/Revoked comments  Returned to baseline has returned to work.          Sue Hudson RN

## 2020-12-09 ENCOUNTER — TELEPHONE (OUTPATIENT)
Dept: GASTROENTEROLOGY | Facility: CLINIC | Age: 45
End: 2020-12-09

## 2020-12-09 NOTE — TELEPHONE ENCOUNTER
----- Message from Tono Romero MD sent at 11/6/2020 10:31 AM EST -----  Pathology benign  Continue PPI and follow-up with PCP

## 2020-12-11 ENCOUNTER — OFFICE VISIT (OUTPATIENT)
Dept: CARDIOLOGY | Facility: CLINIC | Age: 45
End: 2020-12-11

## 2020-12-11 VITALS
SYSTOLIC BLOOD PRESSURE: 148 MMHG | HEART RATE: 65 BPM | BODY MASS INDEX: 26.07 KG/M2 | HEIGHT: 71 IN | DIASTOLIC BLOOD PRESSURE: 88 MMHG | WEIGHT: 186.2 LBS

## 2020-12-11 DIAGNOSIS — I51.89 DIASTOLIC DYSFUNCTION WITHOUT HEART FAILURE: ICD-10-CM

## 2020-12-11 DIAGNOSIS — I10 ESSENTIAL HYPERTENSION: Primary | Chronic | ICD-10-CM

## 2020-12-11 DIAGNOSIS — N18.31 STAGE 3A CHRONIC KIDNEY DISEASE (HCC): Chronic | ICD-10-CM

## 2020-12-11 PROCEDURE — 93000 ELECTROCARDIOGRAM COMPLETE: CPT | Performed by: INTERNAL MEDICINE

## 2020-12-11 PROCEDURE — 99214 OFFICE O/P EST MOD 30 MIN: CPT | Performed by: INTERNAL MEDICINE

## 2020-12-11 NOTE — PROGRESS NOTES
Subjective:     Encounter Date:12/11/2020      Patient ID: Sadi Reyes is a 45 y.o. male.    Chief Complaint:  History of Present Illness    This is a 45-year-old with a history of hypertension, chronic kidney disease, hyperlipidemia, chronic diastolic congestive heart failure, who presents for follow-up.    I saw the patient when he was admitted to the hospital in 10/2020 after presenting with epigastric/chest pain and nausea and vomiting.  He reports that he began having symptoms of diarrhea followed by nausea and vomiting and decreased oral intake.  Following the vomiting he began developing epigastric department pressure that radiated to his chest.  His work-up in the emergency room showed evidence of acute kidney injury.  A CT of the abdomen and pelvis showed evidence of possible pyelonephritis.  His renal function improved with IV fluids.  I felt that his pain was more GI in nature rather than due to cardiac ischemia.  His troponins were negative his EKG was unchanged and an echocardiogram showed normal left ventricular systolic function wall motion with an EF of 60% and grade 3 diastolic dysfunction with no significant valvular disease.  He ended up undergoing an EGD that showed evidence of gastritis and duodenitis.  He was started on pantoprazole and Carafate with improvement in his symptoms.  His blood pressure medications were also adjusted during his stay.    Since his discharge he reports improvement in his chest discomfort.  He denies any shortness of breath, palpitations, orthopnea, near-syncope or syncope, or lower extremity edema.  He also reports that he is being better about his diet.    Review of Systems   Constitution: Negative for malaise/fatigue.   HENT: Negative for hearing loss, hoarse voice, nosebleeds and sore throat.    Eyes: Negative for pain.   Cardiovascular: Negative for chest pain, claudication, cyanosis, dyspnea on exertion, irregular heartbeat, leg swelling, near-syncope,  orthopnea, palpitations, paroxysmal nocturnal dyspnea and syncope.   Respiratory: Negative for shortness of breath and snoring.    Endocrine: Negative for cold intolerance, heat intolerance, polydipsia, polyphagia and polyuria.   Skin: Negative for itching and rash.   Musculoskeletal: Negative for arthritis, falls, joint pain, joint swelling, muscle cramps, muscle weakness and myalgias.   Gastrointestinal: Negative for constipation, diarrhea, dysphagia, heartburn, hematemesis, hematochezia, melena, nausea and vomiting.   Genitourinary: Negative for frequency, hematuria and hesitancy.   Neurological: Negative for excessive daytime sleepiness, dizziness, headaches, light-headedness, numbness and weakness.   Psychiatric/Behavioral: Negative for depression. The patient is not nervous/anxious.          Current Outpatient Medications:   •  amLODIPine (NORVASC) 10 MG tablet, Take 1 tablet by mouth Daily., Disp: 30 tablet, Rfl: 0  •  atorvastatin (LIPITOR) 20 MG tablet, Take 20 mg by mouth Daily., Disp: , Rfl:   •  pantoprazole (PROTONIX) 40 MG EC tablet, Take 1 tablet by mouth 2 (Two) Times a Day Before Meals., Disp: 60 tablet, Rfl: 2  •  ubrogepant (ubrogepant) 50 MG tablet, Take 1 tablet by mouth As Needed (headache)., Disp: 10 tablet, Rfl: 1  •  sucralfate (CARAFATE) 1 g tablet, Take 1 tablet by mouth 2 (Two) Times a Day Before Meals., Disp: 60 tablet, Rfl: 0    Past Medical History:   Diagnosis Date   • Acute nephritis    • Acute pyelonephritis    • VANESSA (acute kidney injury) (CMS/HCC)    • Carpal tunnel syndrome    • Epigastric pain     epigastric/chest pain   • Headache    • Hypertension    • Palpitations        Past Surgical History:   Procedure Laterality Date   • BUNIONECTOMY     • ENDOSCOPY N/A 11/2/2020    Procedure: ESOPHAGOGASTRODUODENOSCOPY with biopsies;  Surgeon: Tono Romero MD;  Location: [facility] ENDOSCOPY;  Service: Gastroenterology;  Laterality: N/A;  Pre: epigastric pain  Post: duodenitis, gastritis,  "hiatal hernia   • FOOT SURGERY     • HERNIA REPAIR         Family History   Problem Relation Age of Onset   • Hypertension Mother    • No Known Problems Father    • No Known Problems Sister        Social History     Tobacco Use   • Smoking status: Never Smoker   • Smokeless tobacco: Never Used   Substance Use Topics   • Alcohol use: Yes     Comment: seldom -  1 x weekly   • Drug use: Yes     Frequency: 1.0 times per week     Types: Marijuana         ECG 12 Lead    Date/Time: 12/11/2020 4:02 PM  Performed by: Marta Qureshi MD  Authorized by: Marta Qureshi MD   Comparison: compared with previous ECG   Rhythm: sinus rhythm  T inversion: III and aVF                 Objective:     Visit Vitals  /88 (BP Location: Left arm, Patient Position: Sitting)   Pulse 65   Ht 180.3 cm (71\")   Wt 84.5 kg (186 lb 3.2 oz)   BMI 25.97 kg/m²         Constitutional:       Appearance: Normal appearance. Well-developed.   HENT:      Head: Normocephalic and atraumatic.   Neck:      Vascular: No carotid bruit or JVD.   Pulmonary:      Effort: Pulmonary effort is normal.      Breath sounds: Normal breath sounds.   Cardiovascular:      Normal rate. Regular rhythm.      No gallop.   Pulses:     Radial: 2+ bilaterally.  Edema:     Peripheral edema absent.   Abdominal:      Palpations: Abdomen is soft.   Skin:     General: Skin is warm and dry.   Neurological:      Mental Status: Alert and oriented to person, place, and time.             Assessment:          Diagnosis Plan   1. Essential hypertension     2. Diastolic dysfunction without heart failure     3. Stage 3a chronic kidney disease            Plan:         1.  Hypertension.  Mildly elevated in the office but has otherwise been well controlled.  We will continue to monitor on his current dose of amlodipine.  2.  Chronic diastolic congestive heart failure.  Recent echocardiogram showed evidence of grade 3 diastolic dysfunction.  He is not exhibiting any signs of volume overload.  " We will continue to manage this with blood pressure management.  If his blood pressures continue to be an issue main need to consider starting him on a beta-blocker.    We will plan on seeing the patient back again in 6 months.

## 2020-12-15 ENCOUNTER — PATIENT MESSAGE (OUTPATIENT)
Dept: INTERNAL MEDICINE | Facility: CLINIC | Age: 45
End: 2020-12-15

## 2020-12-15 NOTE — TELEPHONE ENCOUNTER
From: Sadi Reyes  To: CHARI Feng  Sent: 12/15/2020 2:10 PM EST  Subject: Non-Urgent Medical Question    Have a question for Ms. Curran about fmla

## 2021-01-08 ENCOUNTER — OFFICE VISIT (OUTPATIENT)
Dept: NEUROLOGY | Facility: CLINIC | Age: 46
End: 2021-01-08

## 2021-01-08 ENCOUNTER — TELEPHONE (OUTPATIENT)
Dept: CARDIOLOGY | Facility: CLINIC | Age: 46
End: 2021-01-08

## 2021-01-08 VITALS
HEART RATE: 71 BPM | DIASTOLIC BLOOD PRESSURE: 82 MMHG | HEIGHT: 71 IN | SYSTOLIC BLOOD PRESSURE: 140 MMHG | BODY MASS INDEX: 25.9 KG/M2 | OXYGEN SATURATION: 98 % | WEIGHT: 185 LBS

## 2021-01-08 DIAGNOSIS — R55 SYNCOPE AND COLLAPSE: Primary | ICD-10-CM

## 2021-01-08 PROCEDURE — 99213 OFFICE O/P EST LOW 20 MIN: CPT | Performed by: NURSE PRACTITIONER
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
## 2021-01-08 NOTE — TELEPHONE ENCOUNTER
Patient called and left  stating that he seen his neurology today, and he was told to give our office a call. Since his last visit with  he has loss consciousness twice so far, and they wanted to know your opinion.     He can be reached at [phone]    Thanks

## 2021-01-08 NOTE — TELEPHONE ENCOUNTER
Pt Advised of recommendation. Pt verbalized understanding.    Will you please call and get pt scheduled for a FU appt ASAP??    Thanks,   ASHLEY Sosa

## 2021-01-12 ENCOUNTER — OFFICE VISIT (OUTPATIENT)
Dept: CARDIOLOGY | Facility: CLINIC | Age: 46
End: 2021-01-12

## 2021-01-12 VITALS
SYSTOLIC BLOOD PRESSURE: 126 MMHG | BODY MASS INDEX: 25.96 KG/M2 | HEIGHT: 71 IN | DIASTOLIC BLOOD PRESSURE: 82 MMHG | WEIGHT: 185.4 LBS | HEART RATE: 63 BPM

## 2021-01-12 DIAGNOSIS — R55 SYNCOPE AND COLLAPSE: Primary | ICD-10-CM

## 2021-01-12 DIAGNOSIS — I10 ESSENTIAL HYPERTENSION: Chronic | ICD-10-CM

## 2021-01-12 DIAGNOSIS — I51.89 DIASTOLIC DYSFUNCTION WITHOUT HEART FAILURE: ICD-10-CM

## 2021-01-12 DIAGNOSIS — N18.31 STAGE 3A CHRONIC KIDNEY DISEASE (HCC): Chronic | ICD-10-CM

## 2021-01-12 PROCEDURE — 93000 ELECTROCARDIOGRAM COMPLETE: CPT | Performed by: INTERNAL MEDICINE

## 2021-01-12 PROCEDURE — 99214 OFFICE O/P EST MOD 30 MIN: CPT | Performed by: INTERNAL MEDICINE

## 2021-01-12 NOTE — PROGRESS NOTES
Subjective:     Encounter Date:01/12/2021      Patient ID: Sadi Reyes is a 45 y.o. male.    Chief Complaint:  History of Present Illness    This is a 45-year-old with a history of hypertension, chronic kidney disease, hyperlipidemia, chronic diastolic congestive heart failure, who presents for follow-up.     He presents today for urgent follow-up for syncope.  Since I saw the patient last he reports that he has had 2 episodes of near syncope and syncope.  First episode occurred the week before Christmas.  He reports that he was getting his haircut and had his head bent down.  He had a sudden onset of nausea and lightheadedness.  He was able to get up out of the chair and lay on the floor.  With that he was able to avoid passing out.  He recovered quickly after laying down and had no further issues that day.  The second episode occurred on New Year's Danelle when he was eating in a restaurant.  He reports that he had a sudden onset of nausea and lightheadedness again.  He went outside to sit on a bench and ended up losing consciousness.  He reports that soon as he regained consciousness he felt fine.  The only other episode he has had a passing out was about 2 to 3 years ago.  The patient reports that he was standing in line to get into a restaurant.  He does admit that he was in line for long period of time.  He reports that he had the same symptoms of nausea and lightheadedness and ended up losing consciousness.  He reports that in between he has had several episodes of similar preceding symptoms but no actual syncope.    He denies any chest pain, shortness of breath, orthopnea, near-syncope or syncope, or lower extremity edema.  Reports intermittent palpitations that seem to get worse when he is feeling anxious or stressed.  The frequency of the palpitations is quite random and he cannot even tell me if it occurs once a month.    Prior History:  I saw the patient when he was admitted to the hospital in 10/2020  after presenting with epigastric/chest pain and nausea and vomiting.  He reports that he began having symptoms of diarrhea followed by nausea and vomiting and decreased oral intake.  Following the vomiting he began developing epigastric department pressure that radiated to his chest.  His work-up in the emergency room showed evidence of acute kidney injury.  A CT of the abdomen and pelvis showed evidence of possible pyelonephritis.  His renal function improved with IV fluids.  I felt that his pain was more GI in nature rather than due to cardiac ischemia.  His troponins were negative his EKG was unchanged and an echocardiogram showed normal left ventricular systolic function wall motion with an EF of 60% and grade 3 diastolic dysfunction with no significant valvular disease.  He ended up undergoing an EGD that showed evidence of gastritis and duodenitis.  He was started on pantoprazole and Carafate with improvement in his symptoms.  His blood pressure medications were also adjusted during his stay.     In follow-up in 12/2020 he was doing better with better blood pressure control.  Plan was to consider beta-blocker therapy if he required further blood pressure management.    Review of Systems   Constitution: Negative for malaise/fatigue.   HENT: Negative for hearing loss, hoarse voice, nosebleeds and sore throat.    Eyes: Negative for pain.   Cardiovascular: Positive for near-syncope, palpitations and syncope. Negative for chest pain, claudication, cyanosis, dyspnea on exertion, irregular heartbeat, leg swelling, orthopnea and paroxysmal nocturnal dyspnea.   Respiratory: Negative for shortness of breath and snoring.    Endocrine: Negative for cold intolerance, heat intolerance, polydipsia, polyphagia and polyuria.   Skin: Negative for itching and rash.   Musculoskeletal: Negative for arthritis, falls, joint pain, joint swelling, muscle cramps, muscle weakness and myalgias.   Gastrointestinal: Negative for  constipation, diarrhea, dysphagia, heartburn, hematemesis, hematochezia, melena, nausea and vomiting.   Genitourinary: Negative for frequency, hematuria and hesitancy.   Neurological: Negative for excessive daytime sleepiness, dizziness, headaches, light-headedness, numbness and weakness.   Psychiatric/Behavioral: Negative for depression. The patient is not nervous/anxious.          Current Outpatient Medications:   •  amLODIPine (NORVASC) 10 MG tablet, Take 1 tablet by mouth Daily., Disp: 30 tablet, Rfl: 0  •  atorvastatin (LIPITOR) 20 MG tablet, Take 20 mg by mouth Daily., Disp: , Rfl:   •  pantoprazole (PROTONIX) 40 MG EC tablet, Take 1 tablet by mouth 2 (Two) Times a Day Before Meals., Disp: 60 tablet, Rfl: 2  •  sucralfate (CARAFATE) 1 g tablet, Take 1 tablet by mouth 2 (Two) Times a Day Before Meals., Disp: 60 tablet, Rfl: 0  •  ubrogepant (ubrogepant) 50 MG tablet, Take 1 tablet by mouth As Needed (headache)., Disp: 10 tablet, Rfl: 1    Past Medical History:   Diagnosis Date   • Acute nephritis    • Acute pyelonephritis    • VANESSA (acute kidney injury) (CMS/HCC)    • Carpal tunnel syndrome    • Cluster headache    • Epigastric pain     epigastric/chest pain   • Headache    • Headache, tension-type    • Hyperlipidemia    • Hypertension    • Migraine    • Palpitations    • Syncope        Past Surgical History:   Procedure Laterality Date   • BUNIONECTOMY     • ENDOSCOPY N/A 11/2/2020    Procedure: ESOPHAGOGASTRODUODENOSCOPY with biopsies;  Surgeon: Tono Romero MD;  Location: [facility] ENDOSCOPY;  Service: Gastroenterology;  Laterality: N/A;  Pre: epigastric pain  Post: duodenitis, gastritis, hiatal hernia   • FOOT SURGERY     • HERNIA REPAIR         Family History   Problem Relation Age of Onset   • Hypertension Mother    • No Known Problems Father    • No Known Problems Sister        Social History     Tobacco Use   • Smoking status: Never Smoker   • Smokeless tobacco: Never Used   Substance Use Topics   •  "Alcohol use: Yes     Comment: seldom -  1 x weekly   • Drug use: Yes     Frequency: 1.0 times per week     Types: Marijuana         ECG 12 Lead    Date/Time: 1/12/2021 5:19 PM  Performed by: Marta Qureshi MD  Authorized by: Marta Qureshi MD   Comparison: compared with previous ECG   Similar to previous ECG  Rhythm: sinus rhythm  T inversion: III and aVF  Other findings: left ventricular hypertrophy               Objective:     Visit Vitals  /82 (BP Location: Right arm, Patient Position: Sitting)   Pulse 63   Ht 180.3 cm (71\")   Wt 84.1 kg (185 lb 6.4 oz)   BMI 25.86 kg/m²         Constitutional:       Appearance: Normal appearance. Well-developed.   HENT:      Head: Normocephalic and atraumatic.   Neck:      Vascular: No carotid bruit or JVD.   Pulmonary:      Effort: Pulmonary effort is normal.      Breath sounds: Normal breath sounds.   Cardiovascular:      Normal rate. Regular rhythm.      No gallop.   Pulses:     Radial: 2+ bilaterally.  Edema:     Peripheral edema absent.   Abdominal:      Palpations: Abdomen is soft.   Skin:     General: Skin is warm and dry.   Neurological:      Mental Status: Alert and oriented to person, place, and time.             Assessment:          Diagnosis Plan   1. Syncope and collapse     2. Essential hypertension     3. Diastolic dysfunction without heart failure     4. Stage 3a chronic kidney disease            Plan:       1.  Syncope.  Based on his description of his symptoms it sounds like he is having episodes of vasovagal syncope.  I explained this to the patient.  I also went over maneuvers for him to try in order to avoid losing consciousness.  Fortunately he seems to have pretty consistent presyncopal symptoms to warn him.  2.  Palpitations.  Symptoms appear to be fairly infrequent at this point.  Will monitor for now and consider having him wear a monitor if symptoms increase in frequency.  3.  Hypertension.  Well-controlled in the office today.  He reports at " home his systolics were in the 120s to 140s and diastolics have been under 90.  4.  Chronic diastolic congestive heart failure.  No evidence of volume overload.  5.  Chronic kidney disease  6.  Hyperlipidemia    We will plan on seeing the patient back again as scheduled in June.

## 2021-01-14 ENCOUNTER — TELEPHONE (OUTPATIENT)
Dept: NEUROLOGY | Facility: CLINIC | Age: 46
End: 2021-01-14

## 2021-01-14 NOTE — TELEPHONE ENCOUNTER
Caller: PT    Relationship: SELF    Best call back number: [phone]    What form or medical record are you requesting: [facility]    Who is requesting this form or medical record from you: [facility]     Timeframe paperwork needed: ASAP    Additional notes: PT STATES PEOPLE FROM [facility] FAXED PPW TO OFFICE ON PT'S LAST OV VISIT AND HE WANTED TO MAKE SURE IT'S BEEN RECEIVED AND FILLED OUT. PT STATES [facility] GIVES HIM A TWO - THREE WEEK WINDOW FOR IT TO BE FILLED OUT AND RETURNED. PLEASE ADVISE.

## 2021-01-14 NOTE — TELEPHONE ENCOUNTER
Spoke with patient to let him know [facility] paper work was faxed back on 1/12/2021.

## 2021-01-25 ENCOUNTER — PATIENT MESSAGE (OUTPATIENT)
Dept: INTERNAL MEDICINE | Facility: CLINIC | Age: 46
End: 2021-01-25

## 2021-01-25 ENCOUNTER — HOSPITAL ENCOUNTER (EMERGENCY)
Facility: HOSPITAL | Age: 46
Discharge: HOME | End: 2021-01-25
Attending: EMERGENCY MEDICINE | Admitting: EMERGENCY MEDICINE

## 2021-01-25 VITALS
HEART RATE: 64 BPM | RESPIRATION RATE: 17 BRPM | DIASTOLIC BLOOD PRESSURE: 90 MMHG | WEIGHT: 180 LBS | HEIGHT: 71 IN | OXYGEN SATURATION: 99 % | BODY MASS INDEX: 25.2 KG/M2 | SYSTOLIC BLOOD PRESSURE: 134 MMHG | TEMPERATURE: 97.6 F

## 2021-01-25 DIAGNOSIS — I10 HYPERTENSION NOT AT GOAL: Primary | ICD-10-CM

## 2021-01-25 PROCEDURE — 99283 EMERGENCY DEPT VISIT LOW MDM: CPT

## 2021-01-25 RX ORDER — METOPROLOL SUCCINATE 25 MG/1
25 TABLET, EXTENDED RELEASE ORAL DAILY
Qty: 30 TABLET | Refills: 0 | Status: SHIPPED | OUTPATIENT
Start: 2021-01-25 | End: 2022-01-04

## 2021-01-25 RX ORDER — HYDRALAZINE HYDROCHLORIDE 10 MG/1
10 TABLET, FILM COATED ORAL 3 TIMES DAILY
COMMUNITY
End: 2022-01-04

## 2021-01-25 NOTE — TELEPHONE ENCOUNTER
From: Sadi Reyes  To: CHARI Feng  Sent: 1/25/2021 11:48 AM EST  Subject: Complaint    Blood pressure reading been very high past couple days. Not feeling good

## 2021-02-02 ENCOUNTER — HOSPITAL ENCOUNTER (OUTPATIENT)
Dept: CT IMAGING | Facility: HOSPITAL | Age: 46
Discharge: HOME | End: 2021-02-02
Admitting: NURSE PRACTITIONER

## 2021-02-02 DIAGNOSIS — R55 SYNCOPE AND COLLAPSE: ICD-10-CM

## 2021-02-02 LAB — CREAT BLDA-MCNC: 1.1 MG/DL (ref 0.6–1.3)

## 2021-02-02 PROCEDURE — 82565 ASSAY OF CREATININE: CPT

## 2021-02-02 PROCEDURE — 70498 CT ANGIOGRAPHY NECK: CPT

## 2021-02-02 PROCEDURE — 70496 CT ANGIOGRAPHY HEAD: CPT

## 2021-02-02 PROCEDURE — 0 IOPAMIDOL PER 1 ML: Performed by: NURSE PRACTITIONER

## 2021-02-02 RX ADMIN — IOPAMIDOL 95 ML: 755 INJECTION, SOLUTION INTRAVENOUS at 18:40

## 2021-02-04 ENCOUNTER — OFFICE VISIT (OUTPATIENT)
Dept: INTERNAL MEDICINE | Facility: CLINIC | Age: 46
End: 2021-02-04

## 2021-02-04 VITALS
WEIGHT: 185 LBS | OXYGEN SATURATION: 99 % | BODY MASS INDEX: 25.9 KG/M2 | HEART RATE: 67 BPM | HEIGHT: 71 IN | TEMPERATURE: 98.9 F | DIASTOLIC BLOOD PRESSURE: 90 MMHG | SYSTOLIC BLOOD PRESSURE: 134 MMHG

## 2021-02-04 DIAGNOSIS — I10 ESSENTIAL HYPERTENSION: ICD-10-CM

## 2021-02-04 DIAGNOSIS — N17.9 AKI (ACUTE KIDNEY INJURY) (HCC): ICD-10-CM

## 2021-02-04 DIAGNOSIS — Z00.00 ANNUAL PHYSICAL EXAM: Primary | ICD-10-CM

## 2021-02-04 DIAGNOSIS — Z12.5 SCREENING FOR PROSTATE CANCER: ICD-10-CM

## 2021-02-04 LAB
CHOLEST SERPL-MCNC: 145 MG/DL (ref 0–200)
HDLC SERPL-MCNC: 46 MG/DL (ref 40–60)
LDLC SERPL CALC-MCNC: 85 MG/DL (ref 0–100)
PSA SERPL-MCNC: 0.55 NG/ML (ref 0–4)
TRIGL SERPL-MCNC: 70 MG/DL (ref 0–150)
TSH SERPL DL<=0.005 MIU/L-ACNC: 0.9 UIU/ML (ref 0.27–4.2)
VLDLC SERPL CALC-MCNC: 14 MG/DL (ref 5–40)

## 2021-02-04 PROCEDURE — 99396 PREV VISIT EST AGE 40-64: CPT | Performed by: NURSE PRACTITIONER

## 2021-02-04 NOTE — PROGRESS NOTES
Addended by: Benjy Montemayor on: 5/26/2018 10:47 AM     Modules accepted: Orders Yue Reyes is a 45 y.o. male.     .Well Adult Physical   Patient here for a comprehensive physical exam.The patient reports problems - hypertension     He works full time. He is very active at work. His diet is good. He is up to date with dental and vision exam. He has been having issues with headaches and blood pressure. He went to ER on 1/25/2021 due to feeling tired and having headache. His blood pressure was elevated and he was given script for metoprolol. However he has not started as he states his readings have been normal.     He saw neurologist on 1/8/2021 due to headache and syncopal episodes. He had CTA and no blockage was noted. He also saw cardiologist on 1/12/2021 and it was thought that episodes were vasovagal in nature.        The following portions of the patient's history were reviewed and updated as appropriate: allergies, current medications, past family history, past medical history, past social history, past surgical history and problem list.    Review of Systems   Constitutional: Negative for activity change, appetite change, chills, fatigue, fever and unexpected weight change.   HENT: Negative for congestion, ear discharge, ear pain, hearing loss, mouth sores, nosebleeds, postnasal drip, rhinorrhea, sinus pressure, sneezing, sore throat, tinnitus and voice change.    Eyes: Negative for visual disturbance.   Respiratory: Negative for cough, chest tightness, shortness of breath and wheezing.    Cardiovascular: Negative for chest pain, palpitations and leg swelling.   Gastrointestinal: Negative for abdominal distention, abdominal pain, anal bleeding, blood in stool, constipation, diarrhea, nausea and vomiting.        Reflux improved    Endocrine: Negative for cold intolerance, heat intolerance, polydipsia, polyphagia and polyuria.   Genitourinary: Negative for difficulty urinating, discharge, frequency, hematuria, penile pain, penile swelling, scrotal swelling, testicular pain  and urgency.        Urinary dribbling-chronic    Musculoskeletal: Negative for arthralgias, back pain, gait problem, joint swelling, myalgias, neck pain and neck stiffness.   Skin: Negative for color change, pallor and rash.   Neurological: Positive for light-headedness and headaches. Negative for dizziness, tremors, seizures, syncope, speech difficulty, weakness and numbness.   Hematological: Negative for adenopathy. Does not bruise/bleed easily.   Psychiatric/Behavioral: Negative for confusion, decreased concentration, dysphoric mood, sleep disturbance and suicidal ideas. The patient is nervous/anxious.        Objective   Physical Exam  Constitutional:       Appearance: He is well-developed.   HENT:      Head: Normocephalic.      Right Ear: Hearing, tympanic membrane, ear canal and external ear normal.      Left Ear: Hearing, tympanic membrane, ear canal and external ear normal.   Eyes:      General: Lids are normal.      Extraocular Movements: Extraocular movements intact.      Conjunctiva/sclera: Conjunctivae normal.      Pupils: Pupils are equal, round, and reactive to light.   Neck:      Musculoskeletal: Normal range of motion.      Thyroid: No thyroid mass or thyromegaly.      Vascular: No carotid bruit.      Trachea: No tracheal deviation.   Cardiovascular:      Rate and Rhythm: Normal rate and regular rhythm.      Pulses: Normal pulses.      Heart sounds: Normal heart sounds. No murmur. No friction rub. No gallop.       Comments: Repeat bp left arm 122/88  No pedal edema   Pulmonary:      Effort: Pulmonary effort is normal. No respiratory distress.      Breath sounds: Normal breath sounds. No wheezing or rales.   Chest:      Chest wall: No tenderness.   Abdominal:      General: Bowel sounds are normal. There is no distension.      Palpations: Abdomen is soft.      Tenderness: There is no abdominal tenderness.      Hernia: There is no hernia in the left inguinal area or right inguinal area.    Genitourinary:     Penis: Normal.       Scrotum/Testes: Normal.      Epididymis:      Right: Normal.      Left: Normal.      Rectum: Guaiac result negative.   Musculoskeletal:         General: No tenderness or deformity.      Comments: (-)SLR  Crepitus in knees   Lymphadenopathy:      Head:      Right side of head: No submental, submandibular, tonsillar, preauricular, posterior auricular or occipital adenopathy.      Left side of head: No submental, submandibular, tonsillar, preauricular, posterior auricular or occipital adenopathy.      Cervical: No cervical adenopathy.      Lower Body: No right inguinal adenopathy. No left inguinal adenopathy.   Skin:     General: Skin is warm.      Coloration: Skin is not pale.      Findings: No erythema.      Comments: Tattoos noted    Neurological:      Mental Status: He is alert and oriented to person, place, and time.      Cranial Nerves: No cranial nerve deficit.      Motor: No abnormal muscle tone.      Coordination: Coordination normal.      Gait: Gait is intact. Tandem walk normal.      Deep Tendon Reflexes: Reflexes normal.      Reflex Scores:       Patellar reflexes are 2+ on the right side and 2+ on the left side.  Psychiatric:         Mood and Affect: Mood and affect normal.         Speech: Speech normal.         Behavior: Behavior normal.         Thought Content: Thought content normal.         Cognition and Memory: Cognition and memory normal.         Judgment: Judgment normal.         Assessment/Plan   Diagnoses and all orders for this visit:    1. Annual physical exam (Primary)  -     Lipid Panel  -     TSH Rfx On Abnormal To Free T4    2. Screening for prostate cancer  -     PSA SCREENING; Future  -     PSA SCREENING    3. Essential hypertension  Comments:  normal in office today; he will hold metoprolol and take if bp greater than 160/90    4. VANESSA (acute kidney injury) (CMS/[facility])  Comments:  recovered; saw nephrologist 1/18/2021      I reviewed recent labs with  nephrologist. I also reviewed CTA with patient.   He was advised to cardio exercises at least 3-4 times a week with healthy diet  Continue with covid precautions   Needs to monitor blood pressure daily with readings less than 140/90; he will bring to next appointment, along with his machine for comparison.

## 2021-02-04 NOTE — PATIENT INSTRUCTIONS
Health Maintenance, Male  Adopting a healthy lifestyle and getting preventive care are important in promoting health and wellness. Ask your health care provider about:  · The right schedule for you to have regular tests and exams.  · Things you can do on your own to prevent diseases and keep yourself healthy.  What should I know about diet, weight, and exercise?  Eat a healthy diet    · Eat a diet that includes plenty of vegetables, fruits, low-fat dairy products, and lean protein.  · Do not eat a lot of foods that are high in solid fats, added sugars, or sodium.  Maintain a healthy weight  Body mass index (BMI) is a measurement that can be used to identify possible weight problems. It estimates body fat based on height and weight. Your health care provider can help determine your BMI and help you achieve or maintain a healthy weight.  Get regular exercise  Get regular exercise. This is one of the most important things you can do for your health. Most adults should:  · Exercise for at least 150 minutes each week. The exercise should increase your heart rate and make you sweat (moderate-intensity exercise).  · Do strengthening exercises at least twice a week. This is in addition to the moderate-intensity exercise.  · Spend less time sitting. Even light physical activity can be beneficial.  Watch cholesterol and blood lipids  Have your blood tested for lipids and cholesterol at 20 years of age, then have this test every 5 years.  You may need to have your cholesterol levels checked more often if:  · Your lipid or cholesterol levels are high.  · You are older than 40 years of age.  · You are at high risk for heart disease.  What should I know about cancer screening?  Many types of cancers can be detected early and may often be prevented. Depending on your health history and family history, you may need to have cancer screening at various ages. This may include screening for:  · Colorectal cancer.  · Prostate  cancer.  · Skin cancer.  · Lung cancer.  What should I know about heart disease, diabetes, and high blood pressure?  Blood pressure and heart disease  · High blood pressure causes heart disease and increases the risk of stroke. This is more likely to develop in people who have high blood pressure readings, are of  descent, or are overweight.  · Talk with your health care provider about your target blood pressure readings.  · Have your blood pressure checked:  ? Every 3-5 years if you are 18-39 years of age.  ? Every year if you are 40 years old or older.  · If you are between the ages of 65 and 75 and are a current or former smoker, ask your health care provider if you should have a one-time screening for abdominal aortic aneurysm (AAA).  Diabetes  Have regular diabetes screenings. This checks your fasting blood sugar level. Have the screening done:  · Once every three years after age 45 if you are at a normal weight and have a low risk for diabetes.  · More often and at a younger age if you are overweight or have a high risk for diabetes.  What should I know about preventing infection?  Hepatitis B  If you have a higher risk for hepatitis B, you should be screened for this virus. Talk with your health care provider to find out if you are at risk for hepatitis B infection.  Hepatitis C  Blood testing is recommended for:  · Everyone born from 1945 through 1965.  · Anyone with known risk factors for hepatitis C.  Sexually transmitted infections (STIs)  · You should be screened each year for STIs, including gonorrhea and chlamydia, if:  ? You are sexually active and are younger than 24 years of age.  ? You are older than 24 years of age and your health care provider tells you that you are at risk for this type of infection.  ? Your sexual activity has changed since you were last screened, and you are at increased risk for chlamydia or gonorrhea. Ask your health care provider if you are at risk.  · Ask your  health care provider about whether you are at high risk for HIV. Your health care provider may recommend a prescription medicine to help prevent HIV infection. If you choose to take medicine to prevent HIV, you should first get tested for HIV. You should then be tested every 3 months for as long as you are taking the medicine.  Follow these instructions at home:  Lifestyle  · Do not use any products that contain nicotine or tobacco, such as cigarettes, e-cigarettes, and chewing tobacco. If you need help quitting, ask your health care provider.  · Do not use street drugs.  · Do not share needles.  · Ask your health care provider for help if you need support or information about quitting drugs.  Alcohol use  · Do not drink alcohol if your health care provider tells you not to drink.  · If you drink alcohol:  ? Limit how much you have to 0-2 drinks a day.  ? Be aware of how much alcohol is in your drink. In the [place] one drink equals one 12 oz bottle of beer (355 mL), one 5 oz glass of wine (148 mL), or one 1½ oz glass of hard liquor (44 mL).  General instructions  · Schedule regular health, dental, and eye exams.  · Stay current with your vaccines.  · Tell your health care provider if:  ? You often feel depressed.  ? You have ever been abused or do not feel safe at home.  Summary  · Adopting a healthy lifestyle and getting preventive care are important in promoting health and wellness.  · Follow your health care provider's instructions about healthy diet, exercising, and getting tested or screened for diseases.  · Follow your health care provider's instructions on monitoring your cholesterol and blood pressure.  This information is not intended to replace advice given to you by your health care provider. Make sure you discuss any questions you have with your health care provider.  Document Revised: 12/11/2019 Document Reviewed: 12/11/2019  Elsevier Patient Education © 2020 [employer]      DASH Eating Plan  DASH  "stands for \"Dietary Approaches to Stop Hypertension.\" The DASH eating plan is a healthy eating plan that has been shown to reduce high blood pressure (hypertension). It may also reduce your risk for type 2 diabetes, heart disease, and stroke. The DASH eating plan may also help with weight loss.  What are tips for following this plan?    General guidelines  · Avoid eating more than 2,300 mg (milligrams) of salt (sodium) a day. If you have hypertension, you may need to reduce your sodium intake to 1,500 mg a day.  · Limit alcohol intake to no more than 1 drink a day for nonpregnant women and 2 drinks a day for men. One drink equals 12 oz of beer, 5 oz of wine, or 1½ oz of hard liquor.  · Work with your health care provider to maintain a healthy body weight or to lose weight. Ask what an ideal weight is for you.  · Get at least 30 minutes of exercise that causes your heart to beat faster (aerobic exercise) most days of the week. Activities may include walking, swimming, or biking.  · Work with your health care provider or diet and nutrition specialist (dietitian) to adjust your eating plan to your individual calorie needs.  Reading food labels    · Check food labels for the amount of sodium per serving. Choose foods with less than 5 percent of the Daily Value of sodium. Generally, foods with less than 300 mg of sodium per serving fit into this eating plan.  · To find whole grains, look for the word \"whole\" as the first word in the ingredient list.  Shopping  · Buy products labeled as \"low-sodium\" or \"no salt added.\"  · Buy fresh foods. Avoid canned foods and premade or frozen meals.  Cooking  · Avoid adding salt when cooking. Use salt-free seasonings or herbs instead of table salt or sea salt. Check with your health care provider or pharmacist before using salt substitutes.  · Do not toth foods. Cook foods using healthy methods such as baking, boiling, grilling, and broiling instead.  · Cook with heart-healthy oils, " such as olive, canola, soybean, or sunflower oil.  Meal planning  · Eat a balanced diet that includes:  ? 5 or more servings of fruits and vegetables each day. At each meal, try to fill half of your plate with fruits and vegetables.  ? Up to 6-8 servings of whole grains each day.  ? Less than 6 oz of lean meat, poultry, or fish each day. A 3-oz serving of meat is about the same size as a deck of cards. One egg equals 1 oz.  ? 2 servings of low-fat dairy each day.  ? A serving of nuts, seeds, or beans 5 times each week.  ? Heart-healthy fats. Healthy fats called Omega-3 fatty acids are found in foods such as flaxseeds and coldwater fish, like sardines, salmon, and mackerel.  · Limit how much you eat of the following:  ? Canned or prepackaged foods.  ? Food that is high in trans fat, such as fried foods.  ? Food that is high in saturated fat, such as fatty meat.  ? Sweets, desserts, sugary drinks, and other foods with added sugar.  ? Full-fat dairy products.  · Do not salt foods before eating.  · Try to eat at least 2 vegetarian meals each week.  · Eat more home-cooked food and less restaurant, buffet, and fast food.  · When eating at a restaurant, ask that your food be prepared with less salt or no salt, if possible.  What foods are recommended?  The items listed may not be a complete list. Talk with your dietitian about what dietary choices are best for you.  Grains  Whole-grain or whole-wheat bread. Whole-grain or whole-wheat pasta. Brown rice. Oatmeal. Quinoa. Bulgur. Whole-grain and low-sodium cereals. Renae bread. Low-fat, low-sodium crackers. Whole-wheat flour tortillas.  Vegetables  Fresh or frozen vegetables (raw, steamed, roasted, or grilled). Low-sodium or reduced-sodium tomato and vegetable juice. Low-sodium or reduced-sodium tomato sauce and tomato paste. Low-sodium or reduced-sodium canned vegetables.  Fruits  All fresh, dried, or frozen fruit. Canned fruit in natural juice (without added sugar).  Meat  and other protein foods  Skinless chicken or turkey. Ground chicken or turkey. Pork with fat trimmed off. Fish and seafood. Egg whites. Dried beans, peas, or lentils. Unsalted nuts, nut butters, and seeds. Unsalted canned beans. Lean cuts of beef with fat trimmed off. Low-sodium, lean deli meat.  Dairy  Low-fat (1%) or fat-free (skim) milk. Fat-free, low-fat, or reduced-fat cheeses. Nonfat, low-sodium ricotta or cottage cheese. Low-fat or nonfat yogurt. Low-fat, low-sodium cheese.  Fats and oils  Soft margarine without trans fats. Vegetable oil. Low-fat, reduced-fat, or light mayonnaise and salad dressings (reduced-sodium). Canola, safflower, olive, soybean, and sunflower oils. Avocado.  Seasoning and other foods  Herbs. Spices. Seasoning mixes without salt. Unsalted popcorn and pretzels. Fat-free sweets.  What foods are not recommended?  The items listed may not be a complete list. Talk with your dietitian about what dietary choices are best for you.  Grains  Baked goods made with fat, such as croissants, muffins, or some breads. Dry pasta or rice meal packs.  Vegetables  Creamed or fried vegetables. Vegetables in a cheese sauce. Regular canned vegetables (not low-sodium or reduced-sodium). Regular canned tomato sauce and paste (not low-sodium or reduced-sodium). Regular tomato and vegetable juice (not low-sodium or reduced-sodium). Pickles. Olives.  Fruits  Canned fruit in a light or heavy syrup. Fried fruit. Fruit in cream or butter sauce.  Meat and other protein foods  Fatty cuts of meat. Ribs. Fried meat. Christian. Sausage. Bologna and other processed lunch meats. Salami. Fatback. Hotdogs. Bratwurst. Salted nuts and seeds. Canned beans with added salt. Canned or smoked fish. Whole eggs or egg yolks. Chicken or turkey with skin.  Dairy  Whole or 2% milk, cream, and half-and-half. Whole or full-fat cream cheese. Whole-fat or sweetened yogurt. Full-fat cheese. Nondairy creamers. Whipped toppings. Processed cheese and  cheese spreads.  Fats and oils  Butter. Stick margarine. Lard. Shortening. Ghee. Christian fat. Tropical oils, such as coconut, palm kernel, or palm oil.  Seasoning and other foods  Salted popcorn and pretzels. Onion salt, garlic salt, seasoned salt, table salt, and sea salt. [place] sauce. Tartar sauce. Barbecue sauce. Teriyaki sauce. Soy sauce, including reduced-sodium. Steak sauce. Canned and packaged gravies. Fish sauce. Oyster sauce. Cocktail sauce. Horseradish that you find on the shelf. Ketchup. Mustard. Meat flavorings and tenderizers. Bouillon cubes. Hot sauce and Tabasco sauce. Premade or packaged marinades. Premade or packaged taco seasonings. Relishes. Regular salad dressings.  Where to find more information:  · National Heart, Lung, and Blood [place]: www.nhlbi.nih.gov  · American Heart Association: www.heart.org  Summary  · The DASH eating plan is a healthy eating plan that has been shown to reduce high blood pressure (hypertension). It may also reduce your risk for type 2 diabetes, heart disease, and stroke.  · With the DASH eating plan, you should limit salt (sodium) intake to 2,300 mg a day. If you have hypertension, you may need to reduce your sodium intake to 1,500 mg a day.  · When on the DASH eating plan, aim to eat more fresh fruits and vegetables, whole grains, lean proteins, low-fat dairy, and heart-healthy fats.  · Work with your health care provider or diet and nutrition specialist (dietitian) to adjust your eating plan to your individual calorie needs.  This information is not intended to replace advice given to you by your health care provider. Make sure you discuss any questions you have with your health care provider.  Document Revised: 11/30/2018 Document Reviewed: 12/11/2017  Elsevier Patient Education © 2020 Elsevier Inc.

## 2021-02-05 ENCOUNTER — TELEPHONE (OUTPATIENT)
Dept: NEUROLOGY | Facility: CLINIC | Age: 46
End: 2021-02-05

## 2021-02-05 NOTE — TELEPHONE ENCOUNTER
Provider: CHARI LAZCANO  Caller: MAKENZIE NUÑEZ  Relationship to Patient: SELF    Reason for Call: PT STATING HE RECEIVE A CALL FROM THIS OFFICE, BUT I DO NOT SEE A MESSAGE WHERE ANYONE CALLED HIM.  PT WAS RETURNING THE CALL.    PLEASE CALL HIM BACK [phone]

## 2021-02-08 NOTE — TELEPHONE ENCOUNTER
Will you let the patient know his head CT is unchanged from previous head CT on 10/30/20. The CT angiogram did not show any narrowing of his vessels in his head or neck that would explain the syncopal episodes.      Left message again  For patient with information.

## 2021-02-22 ENCOUNTER — TELEPHONE (OUTPATIENT)
Dept: NEUROLOGY | Facility: CLINIC | Age: 46
End: 2021-02-22

## 2021-03-01 RX ORDER — PANTOPRAZOLE SODIUM 40 MG/1
40 TABLET, DELAYED RELEASE ORAL
Qty: 180 TABLET | Refills: 3 | Status: SHIPPED | OUTPATIENT
Start: 2021-03-01 | End: 2021-04-07 | Stop reason: DRUGHIGH

## 2021-03-11 RX ORDER — AMLODIPINE BESYLATE 10 MG/1
10 TABLET ORAL DAILY
Qty: 30 TABLET | Refills: 4 | Status: SHIPPED | OUTPATIENT
Start: 2021-03-11

## 2021-03-14 DIAGNOSIS — E78.2 MIXED HYPERLIPIDEMIA: ICD-10-CM

## 2021-03-15 RX ORDER — ATORVASTATIN CALCIUM 20 MG/1
TABLET, FILM COATED ORAL
Qty: 90 TABLET | Refills: 0 | Status: SHIPPED | OUTPATIENT
Start: 2021-03-15

## 2021-04-02 ENCOUNTER — TELEMEDICINE (OUTPATIENT)
Dept: NEUROLOGY | Facility: CLINIC | Age: 46
End: 2021-04-02

## 2021-04-02 ENCOUNTER — BULK ORDERING (OUTPATIENT)
Dept: CASE MANAGEMENT | Facility: OTHER | Age: 46
End: 2021-04-02

## 2021-04-02 DIAGNOSIS — R55 VASOVAGAL SYNCOPE: ICD-10-CM

## 2021-04-02 DIAGNOSIS — M54.81 BILATERAL OCCIPITAL NEURALGIA: ICD-10-CM

## 2021-04-02 DIAGNOSIS — Z23 IMMUNIZATION DUE: ICD-10-CM

## 2021-04-02 DIAGNOSIS — F41.9 ANXIETY: ICD-10-CM

## 2021-04-02 DIAGNOSIS — G44.209 TENSION HEADACHE: Primary | ICD-10-CM

## 2021-04-02 PROCEDURE — 99213 OFFICE O/P EST LOW 20 MIN: CPT | Performed by: NURSE PRACTITIONER

## 2021-04-02 RX ORDER — VENLAFAXINE 37.5 MG/1
37.5 TABLET ORAL DAILY
Qty: 30 TABLET | Refills: 1 | Status: SHIPPED | OUTPATIENT
Start: 2021-04-02 | End: 2021-04-15

## 2021-04-07 ENCOUNTER — TELEPHONE (OUTPATIENT)
Dept: GASTROENTEROLOGY | Facility: CLINIC | Age: 46
End: 2021-04-07

## 2021-04-09 RX ORDER — PANTOPRAZOLE SODIUM 40 MG/1
40 TABLET, DELAYED RELEASE ORAL DAILY
Qty: 30 TABLET | Refills: 11 | Status: SHIPPED | OUTPATIENT
Start: 2021-04-09 | End: 2022-03-16 | Stop reason: DRUGHIGH

## 2021-04-15 RX ORDER — ESCITALOPRAM OXALATE 10 MG/1
10 TABLET ORAL DAILY
Qty: 30 TABLET | Refills: 2 | Status: SHIPPED | OUTPATIENT
Start: 2021-04-15 | End: 2021-06-24

## 2021-05-28 ENCOUNTER — TELEMEDICINE (OUTPATIENT)
Dept: NEUROLOGY | Facility: CLINIC | Age: 46
End: 2021-05-28

## 2021-05-28 DIAGNOSIS — M54.81 BILATERAL OCCIPITAL NEURALGIA: Primary | ICD-10-CM

## 2021-05-28 DIAGNOSIS — F41.9 ANXIETY: ICD-10-CM

## 2021-05-28 DIAGNOSIS — R55 SYNCOPE AND COLLAPSE: ICD-10-CM

## 2021-05-28 PROCEDURE — 99213 OFFICE O/P EST LOW 20 MIN: CPT | Performed by: NURSE PRACTITIONER

## 2021-06-24 ENCOUNTER — OFFICE VISIT (OUTPATIENT)
Dept: CARDIOLOGY | Facility: CLINIC | Age: 46
End: 2021-06-24

## 2021-06-24 VITALS
HEART RATE: 70 BPM | HEIGHT: 71 IN | DIASTOLIC BLOOD PRESSURE: 92 MMHG | SYSTOLIC BLOOD PRESSURE: 124 MMHG | WEIGHT: 185 LBS | OXYGEN SATURATION: 97 % | BODY MASS INDEX: 25.9 KG/M2

## 2021-06-24 DIAGNOSIS — R55 VASOVAGAL SYNCOPE: ICD-10-CM

## 2021-06-24 DIAGNOSIS — I10 ESSENTIAL HYPERTENSION: Primary | Chronic | ICD-10-CM

## 2021-06-24 DIAGNOSIS — N18.31 STAGE 3A CHRONIC KIDNEY DISEASE (HCC): Chronic | ICD-10-CM

## 2021-06-24 PROCEDURE — 93000 ELECTROCARDIOGRAM COMPLETE: CPT | Performed by: INTERNAL MEDICINE

## 2021-06-24 PROCEDURE — 99214 OFFICE O/P EST MOD 30 MIN: CPT | Performed by: INTERNAL MEDICINE

## 2021-06-24 NOTE — PROGRESS NOTES
Subjective:     Encounter Date:06/24/2021      Patient ID: Sadi Reyes is a 45 y.o. male.    Chief Complaint:  History of Present Illness    This is a 45-year-old with a history of hypertension, chronic kidney disease, hyperlipidemia, chronic diastolic congestive heart failure, who presents for follow-up.    He presents today for routine follow-up.  He went to [place]'s emergency room last month after having a near syncopal episode.  He reports that he was at [facility] visiting his daughter who just had a baby.  He was seated when he began feeling flushed, lightheaded, nauseous, and close to passing out.  Fortunately he did not pass out.  He reports that he had eaten and drank that day without any significant issues.  He denies any significant issues with pain.  It appears that when he went to the emergency room his work-up was unremarkable.  He reportedly complained of a headache at that time.  After an unremarkable work-up he was discharged.    The patient denies any further episodes of near syncope or any episodes of syncope in the last several months.  He denies any chest pain, shortness of breath, palpitations, orthopnea, or lower extremity edema.  He reports that his blood pressures have been well controlled at home.      Prior History:  I saw the patient when he was admitted to the hospital in 10/2020 after presenting with epigastric/chest pain and nausea and vomiting.  He reports that he began having symptoms of diarrhea followed by nausea and vomiting and decreased oral intake.  Following the vomiting he began developing epigastric department pressure that radiated to his chest.  His work-up in the emergency room showed evidence of acute kidney injury.  A CT of the abdomen and pelvis showed evidence of possible pyelonephritis.  His renal function improved with IV fluids.  I felt that his pain was more GI in nature rather than due to cardiac ischemia.  His troponins were negative his EKG was  unchanged and an echocardiogram showed normal left ventricular systolic function wall motion with an EF of 60% and grade 3 diastolic dysfunction with no significant valvular disease.  He ended up undergoing an EGD that showed evidence of gastritis and duodenitis.  He was started on pantoprazole and Carafate with improvement in his symptoms.  His blood pressure medications were also adjusted during his stay.     In follow-up in 12/2020 he was doing better with better blood pressure control.  Plan was to consider beta-blocker therapy if he required further blood pressure management.       In 1/2021 he presented for urgent follow-up for syncope.  Since I saw the patient last he reports that he has had 2 episodes of near syncope and syncope.  First episode occurred the week before Christmas.  He reports that he was getting his haircut and had his head bent down.  He had a sudden onset of nausea and lightheadedness.  He was able to get up out of the chair and lay on the floor.  With that he was able to avoid passing out.  He recovered quickly after laying down and had no further issues that day.  The second episode occurred on New Year's Danelle when he was eating in a restaurant.  He reported that he had a sudden onset of nausea and lightheadedness again.  He went outside to sit on a bench and ended up losing consciousness.  He reported that soon as he regained consciousness he felt fine.  The only other episode he has had a passing out was about 2 to 3 years ago.  The patient reported that he was standing in line to get into a restaurant.  He admitted that he was in line for long period of time.  He reports that he had the same symptoms of nausea and lightheadedness and ended up losing consciousness.  He reported that in between he has had several episodes of similar preceding symptoms but no actual syncope.  I felt that his symptoms were due to vasovagal syncope at that time.  I educated him on vasovagal syncope and  maneuvers in order to avoid syncope when he felt symptomatic.    Review of Systems   Constitutional: Negative for malaise/fatigue.   HENT: Negative for hearing loss, hoarse voice, nosebleeds and sore throat.    Eyes: Negative for pain.   Cardiovascular: Positive for near-syncope. Negative for chest pain, claudication, cyanosis, dyspnea on exertion, irregular heartbeat, leg swelling, orthopnea, palpitations, paroxysmal nocturnal dyspnea and syncope.   Respiratory: Negative for shortness of breath and snoring.    Endocrine: Negative for cold intolerance, heat intolerance, polydipsia, polyphagia and polyuria.   Skin: Negative for itching and rash.   Musculoskeletal: Negative for arthritis, falls, joint pain, joint swelling, muscle cramps, muscle weakness and myalgias.   Gastrointestinal: Negative for constipation, diarrhea, dysphagia, heartburn, hematemesis, hematochezia, melena, nausea and vomiting.   Genitourinary: Negative for frequency, hematuria and hesitancy.   Neurological: Negative for excessive daytime sleepiness, dizziness, headaches, light-headedness, numbness and weakness.   Psychiatric/Behavioral: Negative for depression. The patient is not nervous/anxious.          Current Outpatient Medications:   •  amLODIPine (NORVASC) 10 MG tablet, Take 1 tablet by mouth Daily., Disp: 30 tablet, Rfl: 4  •  atorvastatin (LIPITOR) 20 MG tablet, TAKE 1 TABLET BY MOUTH EVERY DAY, Disp: 90 tablet, Rfl: 0  •  hydrALAZINE (APRESOLINE) 10 MG tablet, Take 10 mg by mouth 3 (Three) Times a Day., Disp: , Rfl:   •  metoprolol succinate XL (TOPROL-XL) 25 MG 24 hr tablet, Take 1 tablet by mouth Daily., Disp: 30 tablet, Rfl: 0  •  pantoprazole (PROTONIX) 40 MG EC tablet, Take 1 tablet by mouth Daily., Disp: 30 tablet, Rfl: 11  •  ubrogepant (ubrogepant) 50 MG tablet, Take 1 tablet by mouth As Needed (headache)., Disp: 10 tablet, Rfl: 1    Past Medical History:   Diagnosis Date   • Acute nephritis    • Acute pyelonephritis    • VANESSA  "(acute kidney injury) (CMS/HCC)    • Carpal tunnel syndrome    • Cluster headache    • Epigastric pain     epigastric/chest pain   • Headache    • Headache, tension-type    • Hyperlipidemia    • Hypertension    • Migraine    • Palpitations    • Syncope        Past Surgical History:   Procedure Laterality Date   • BUNIONECTOMY     • ENDOSCOPY N/A 11/2/2020    Procedure: ESOPHAGOGASTRODUODENOSCOPY with biopsies;  Surgeon: Tono Romero MD;  Location: [facility] ENDOSCOPY;  Service: Gastroenterology;  Laterality: N/A;  Pre: epigastric pain  Post: duodenitis, gastritis, hiatal hernia   • FOOT SURGERY     • HERNIA REPAIR         Family History   Problem Relation Age of Onset   • Hypertension Mother    • No Known Problems Father    • No Known Problems Sister    • Prostate cancer Maternal Grandfather        Social History     Tobacco Use   • Smoking status: Never Smoker   • Smokeless tobacco: Never Used   • Tobacco comment: rare caffeine   Substance Use Topics   • Alcohol use: Yes     Comment: seldom -  1 x weekly   • Drug use: Yes     Frequency: 1.0 times per week     Types: Marijuana         ECG 12 Lead    Date/Time: 6/24/2021 10:30 AM  Performed by: Marta Qureshi MD  Authorized by: Marta Qureshi MD   Comparison: compared with previous ECG   Similar to previous ECG  Rhythm: sinus rhythm               Objective:     Visit Vitals  /92 (BP Location: Right arm, Patient Position: Sitting, Cuff Size: Adult)   Pulse 70   Ht 180.3 cm (71\")   Wt 83.9 kg (185 lb)   SpO2 97%   BMI 25.80 kg/m²         Constitutional:       Appearance: Normal appearance. Well-developed.   HENT:      Head: Normocephalic and atraumatic.   Neck:      Vascular: No carotid bruit or JVD.   Pulmonary:      Effort: Pulmonary effort is normal.      Breath sounds: Normal breath sounds.   Cardiovascular:      Normal rate. Regular rhythm.      No gallop.   Pulses:     Radial: 2+ bilaterally.  Edema:     Peripheral edema absent.   Abdominal:      " Palpations: Abdomen is soft.   Skin:     General: Skin is warm and dry.   Neurological:      Mental Status: Alert and oriented to person, place, and time.             Assessment:          Diagnosis Plan   1. Essential hypertension     2. Stage 3a chronic kidney disease (CMS/[facility])            Plan:       1.  Hypertension.  Well-controlled on his current regimen medications.  Recommend continue the same.  2.  Chronic kidney disease  3.  Vasovagal syncope.  Although his most recent episode sounds unusual I suspect is still due to vasovagal syncope.  At this time continue to monitor his symptoms.  If he has an increased frequency in his episodes whether be near syncope or syncope we will consider further work-up with a monitor placement or even a loop recorder if indicated.  4.  Chronic diastolic function.  Noted to have grade 3 diastolic dysfunction on his last echocardiogram.  He does not have any evidence of volume overload.    I will plan on seeing the patient back again in 6 months.

## 2021-09-21 ENCOUNTER — TELEPHONE (OUTPATIENT)
Dept: GASTROENTEROLOGY | Facility: CLINIC | Age: 46
End: 2021-09-21

## 2021-09-21 NOTE — TELEPHONE ENCOUNTER
----- Message from Tono Romero MD sent at 9/21/2021 12:49 PM EDT -----  Regarding: FW: Complaint  Contact: [phone]  Office visit work and any nurse practitioner that has an opening.  I have no clinic until next week  ----- Message -----  From: Arlen Jimenez RN  Sent: 9/21/2021  12:38 PM EDT  To: Tono Romero MD  Subject: FW: Complaint                                      ----- Message -----  From: Sadi Reyes  Sent: 9/21/2021  12:08 PM EDT  To: [facility]  Subject: RE: Complaint                                    Yes I tried calling to schedule appointment but no answer.  But I had got sick Thursday night Friday morning and caused me to vomit a lot.  Saturday I had stopped vomiting but I notice when I was eating it was a burning sensation on my left side of my stomach.  But now is there constantly no matter if I'm eating or not.      ----- Message -----  From: FRANK DURAN  Sent: 9/21/21, 11:54 AM  To: Sadi Reyes  Subject: RE: Complaint    Mr. Reyes,   Please call the office at [phone] to schedule an appointment. Could you be more specific regarding your pain. I assume you are having left sided pain? How long has this been going on? Are you running a fever?   Arlen DURAN RN      ----- Message -----       From:Sadi Reyes       Sent:9/21/2021 11:43 AM EDT         To:Tono Romero MD    Subject:Complaint    Good morning, I want to come in for an appointment.  I'm having stomach pains on my lunch side burning.

## 2021-09-21 NOTE — TELEPHONE ENCOUNTER
Per Dr. Romero: Office visit work and any nurse practitioner that has an opening.  I have no clinic until next week

## 2021-09-23 ENCOUNTER — OFFICE VISIT (OUTPATIENT)
Dept: GASTROENTEROLOGY | Facility: CLINIC | Age: 46
End: 2021-09-23

## 2021-09-23 VITALS — TEMPERATURE: 98.4 F | BODY MASS INDEX: 26.01 KG/M2 | HEIGHT: 71 IN | WEIGHT: 185.8 LBS

## 2021-09-23 DIAGNOSIS — K21.9 GASTROESOPHAGEAL REFLUX DISEASE, UNSPECIFIED WHETHER ESOPHAGITIS PRESENT: Primary | ICD-10-CM

## 2021-09-23 PROCEDURE — 99214 OFFICE O/P EST MOD 30 MIN: CPT | Performed by: NURSE PRACTITIONER

## 2021-09-23 RX ORDER — SUCRALFATE 1 G/1
1 TABLET ORAL 2 TIMES DAILY
Qty: 28 TABLET | Refills: 0 | Status: SHIPPED | OUTPATIENT
Start: 2021-09-23 | End: 2021-10-07

## 2021-09-23 NOTE — PROGRESS NOTES
"Chief Complaint   Patient presents with   • Nausea   • Vomiting       HPI    Sadi Reyes is a  46 y.o. male here for a follow up visit for nausea and vomiting.    This patient follows with Dr. Romero, new to me.    History of erosive gastritis as cause for epigastric pain and noncardiac chest pain.    Patient reports having an episode of abdominal pain and vomiting after family get together felt like he had food poisoning.  He vomited for 24 hours.  This occurred approximately 1 week ago.  He had continued left upper quadrant pain described as a burning sensation but this resolved and in fact he has been pain-free for the last 48 hours.  He reports pantoprazole \"works wonders.\"  He is taking pantoprazole once daily.  He follows an antireflux diet.  He is not using NSAIDs.  No dysphagia or odynophagia.  Appetite is good.  His weight is stable.    No lower GI complaints.    Past Medical History:   Diagnosis Date   • Acute nephritis    • Acute pyelonephritis    • VANESSA (acute kidney injury) (CMS/HCC)    • Carpal tunnel syndrome    • Cluster headache    • Epigastric pain     epigastric/chest pain   • Headache    • Headache, tension-type    • Hyperlipidemia    • Hypertension    • Migraine    • Palpitations    • Syncope        Past Surgical History:   Procedure Laterality Date   • BUNIONECTOMY     • ENDOSCOPY N/A 11/2/2020    Procedure: ESOPHAGOGASTRODUODENOSCOPY with biopsies;  Surgeon: Tono Romero MD;  Location: [facility] ENDOSCOPY;  Service: Gastroenterology;  Laterality: N/A;  Pre: epigastric pain  Post: duodenitis, gastritis, hiatal hernia   • FOOT SURGERY     • HERNIA REPAIR         Scheduled Meds:     Continuous Infusions: No current facility-administered medications for this visit.      PRN Meds:     Allergies   Allergen Reactions   • Inderal La [Propranolol] Diarrhea and Nausea And Vomiting       Social History     Socioeconomic History   • Marital status:      Spouse name: Not on file   • Number of " children: Not on file   • Years of education: Not on file   • Highest education level: Not on file   Tobacco Use   • Smoking status: Never Smoker   • Smokeless tobacco: Never Used   • Tobacco comment: rare caffeine   Substance and Sexual Activity   • Alcohol use: Yes     Comment: seldom -  1 x weekly   • Drug use: Yes     Frequency: 1.0 times per week     Types: Marijuana   • Sexual activity: Yes     Partners: Female       Family History   Problem Relation Age of Onset   • Hypertension Mother    • No Known Problems Father    • No Known Problems Sister    • Prostate cancer Maternal Grandfather        Review of Systems   Constitutional: Negative for activity change, appetite change, fatigue, fever and unexpected weight change.   HENT: Negative for trouble swallowing.    Respiratory: Negative for apnea, cough, choking, chest tightness, shortness of breath and wheezing.    Cardiovascular: Negative for chest pain, palpitations and leg swelling.   Gastrointestinal: Positive for abdominal pain, nausea and vomiting. Negative for abdominal distention, anal bleeding, blood in stool, constipation, diarrhea and rectal pain.       Vitals:    09/23/21 1332   Temp: 98.4 °F (36.9 °C)       Physical Exam  Constitutional:       Appearance: He is well-developed.   Abdominal:      General: Bowel sounds are normal. There is no distension.      Palpations: Abdomen is soft. There is no mass.      Tenderness: There is no abdominal tenderness. There is no guarding.      Hernia: No hernia is present.   Skin:     General: Skin is warm and dry.      Capillary Refill: Capillary refill takes less than 2 seconds.   Neurological:      Mental Status: He is alert and oriented to person, place, and time.   Psychiatric:         Behavior: Behavior normal.     Assessment    Diagnoses and all orders for this visit:    1. Gastroesophageal reflux disease, unspecified whether esophagitis present (Primary)    Other orders  -     sucralfate (Carafate) 1 g  tablet; Take 1 tablet by mouth 2 (two) times a day for 14 days.  Dispense: 28 tablet; Refill: 0     Plan    Pleasant 46-year-old male seen today in follow-up for an episode of abdominal pain with nausea and vomiting likely triggered by dietary indiscretions during a family get together.  He does have a history of erosive gastritis.  Recommend 2 weeks of Carafate in combination with continue daily PPI therapy.  Follow an antireflux diet.  If symptoms return consider EGD with Dr. Romero.         CHARI Box  [facility]  [address] [place] [address]  Office: [phone]    .diag

## 2021-10-05 RX ORDER — ESCITALOPRAM OXALATE 10 MG/1
10 TABLET ORAL DAILY
Qty: 30 TABLET | Refills: 5 | Status: SHIPPED | OUTPATIENT
Start: 2021-10-05 | End: 2022-06-24 | Stop reason: SDUPTHER

## 2022-01-04 ENCOUNTER — OFFICE VISIT (OUTPATIENT)
Dept: CARDIOLOGY | Facility: CLINIC | Age: 47
End: 2022-01-04

## 2022-01-04 VITALS
BODY MASS INDEX: 26.04 KG/M2 | HEART RATE: 59 BPM | HEIGHT: 71 IN | SYSTOLIC BLOOD PRESSURE: 130 MMHG | DIASTOLIC BLOOD PRESSURE: 80 MMHG | WEIGHT: 186 LBS

## 2022-01-04 DIAGNOSIS — I51.89 DIASTOLIC DYSFUNCTION WITHOUT HEART FAILURE: ICD-10-CM

## 2022-01-04 DIAGNOSIS — N18.31 STAGE 3A CHRONIC KIDNEY DISEASE: Chronic | ICD-10-CM

## 2022-01-04 DIAGNOSIS — R55 VASOVAGAL SYNCOPE: ICD-10-CM

## 2022-01-04 DIAGNOSIS — I10 PRIMARY HYPERTENSION: Primary | Chronic | ICD-10-CM

## 2022-01-04 PROCEDURE — 99214 OFFICE O/P EST MOD 30 MIN: CPT | Performed by: INTERNAL MEDICINE

## 2022-01-04 PROCEDURE — 93000 ELECTROCARDIOGRAM COMPLETE: CPT | Performed by: INTERNAL MEDICINE

## 2022-01-04 NOTE — PROGRESS NOTES
Subjective:     Encounter Date:01/04/2022      Patient ID: Sadi Reyes is a 46 y.o. male.    Chief Complaint:  History of Present Illness    This is a 46-year-old with a history of hypertension, chronic kidney disease, hyperlipidemia, chronic diastolic congestive heart failure, who presents for follow-up.     Presents today for routine follow-up.  Since I saw him last he has been weaned off of hydralazine and metoprolol succinate by Dr. Estrada and remains on amlodipine only.  Blood pressures have remained well controlled.  He reports an episode of near syncope when he was out shopping right before [place].  He was able to sit down and his symptoms resolved.  He denies any other symptoms for several months prior to that or any other symptoms since.  He denies any chest pain, palpitations, orthopnea, near-syncope or syncope, or lower extremity edema.     Prior History:  I saw the patient when he was admitted to the hospital in 10/2020 after presenting with epigastric/chest pain and nausea and vomiting.  He reports that he began having symptoms of diarrhea followed by nausea and vomiting and decreased oral intake.  Following the vomiting he began developing epigastric department pressure that radiated to his chest.  His work-up in the emergency room showed evidence of acute kidney injury.  A CT of the abdomen and pelvis showed evidence of possible pyelonephritis.  His renal function improved with IV fluids.  I felt that his pain was more GI in nature rather than due to cardiac ischemia.  His troponins were negative his EKG was unchanged and an echocardiogram showed normal left ventricular systolic function wall motion with an EF of 60% and grade 3 diastolic dysfunction with no significant valvular disease.  He ended up undergoing an EGD that showed evidence of gastritis and duodenitis.  He was started on pantoprazole and Carafate with improvement in his symptoms.  His blood pressure medications were also  adjusted during his stay.     In follow-up in 12/2020 he was doing better with better blood pressure control.  Plan was to consider beta-blocker therapy if he required further blood pressure management.     In 1/2021 he presented for urgent follow-up for syncope.  Since I saw the patient last he reports that he has had 2 episodes of near syncope and syncope.  First episode occurred the week before Christmas.  He reports that he was getting his haircut and had his head bent down.  He had a sudden onset of nausea and lightheadedness.  He was able to get up out of the chair and lay on the floor.  With that he was able to avoid passing out.  He recovered quickly after laying down and had no further issues that day.  The second episode occurred on New Year's Danelle when he was eating in a restaurant.  He reported that he had a sudden onset of nausea and lightheadedness again.  He went outside to sit on a bench and ended up losing consciousness.  He reported that soon as he regained consciousness he felt fine.  The only other episode he has had a passing out was about 2 to 3 years ago.  The patient reported that he was standing in line to get into a restaurant.  He admitted that he was in line for long period of time.  He reports that he had the same symptoms of nausea and lightheadedness and ended up losing consciousness.  He reported that in between he has had several episodes of similar preceding symptoms but no actual syncope.  I felt that his symptoms were due to vasovagal syncope at that time.  I educated him on vasovagal syncope and maneuvers in order to avoid syncope when he felt symptomatic.    In 5/2021 he went to [facility]'s emergency room last month after having a near syncopal episode.  He reports that he was at [facility] visiting his daughter who just had a baby.  He was seated when he began feeling flushed, lightheaded, nauseous, and close to passing out.  Fortunately he did not pass out.  He reports that  he had eaten and drank that day without any significant issues.  His emergency room work-up was unremarkable and he was discharged from the emergency room.       Review of Systems   Constitutional: Negative for malaise/fatigue.   HENT: Negative for hearing loss, hoarse voice, nosebleeds and sore throat.    Eyes: Negative for pain.   Cardiovascular: Positive for near-syncope. Negative for chest pain, claudication, cyanosis, dyspnea on exertion, irregular heartbeat, leg swelling, orthopnea, palpitations, paroxysmal nocturnal dyspnea and syncope.   Respiratory: Negative for shortness of breath and snoring.    Endocrine: Negative for cold intolerance, heat intolerance, polydipsia, polyphagia and polyuria.   Skin: Negative for itching and rash.   Musculoskeletal: Negative for arthritis, falls, joint pain, joint swelling, muscle cramps, muscle weakness and myalgias.   Gastrointestinal: Negative for constipation, diarrhea, dysphagia, heartburn, hematemesis, hematochezia, melena, nausea and vomiting.   Genitourinary: Negative for frequency, hematuria and hesitancy.   Neurological: Negative for excessive daytime sleepiness, dizziness, headaches, light-headedness, numbness and weakness.   Psychiatric/Behavioral: Negative for depression. The patient is not nervous/anxious.          Current Outpatient Medications:   •  amLODIPine (NORVASC) 10 MG tablet, Take 1 tablet by mouth Daily., Disp: 30 tablet, Rfl: 4  •  atorvastatin (LIPITOR) 20 MG tablet, TAKE 1 TABLET BY MOUTH EVERY DAY, Disp: 90 tablet, Rfl: 0  •  escitalopram (Lexapro) 10 MG tablet, Take 1 tablet by mouth Daily., Disp: 30 tablet, Rfl: 5  •  pantoprazole (PROTONIX) 40 MG EC tablet, Take 1 tablet by mouth Daily., Disp: 30 tablet, Rfl: 11  •  ubrogepant (ubrogepant) 50 MG tablet, Take 1 tablet by mouth As Needed (headache)., Disp: 10 tablet, Rfl: 1    Past Medical History:   Diagnosis Date   • Acute nephritis    • Acute pyelonephritis    • VANESSA (acute kidney injury)  "(HCC)    • Carpal tunnel syndrome    • Cluster headache    • Epigastric pain     epigastric/chest pain   • Headache    • Headache, tension-type    • Hyperlipidemia    • Hypertension    • Migraine    • Palpitations    • Syncope        Past Surgical History:   Procedure Laterality Date   • BUNIONECTOMY     • ENDOSCOPY N/A 11/2/2020    Procedure: ESOPHAGOGASTRODUODENOSCOPY with biopsies;  Surgeon: Tono Romero MD;  Location: [facility] ENDOSCOPY;  Service: Gastroenterology;  Laterality: N/A;  Pre: epigastric pain  Post: duodenitis, gastritis, hiatal hernia   • FOOT SURGERY     • HERNIA REPAIR         Family History   Problem Relation Age of Onset   • Hypertension Mother    • No Known Problems Father    • No Known Problems Sister    • Prostate cancer Maternal Grandfather        Social History     Tobacco Use   • Smoking status: Never Smoker   • Smokeless tobacco: Never Used   • Tobacco comment: rare caffeine   Substance Use Topics   • Alcohol use: Yes     Comment: seldom -  1 x weekly   • Drug use: Yes     Frequency: 1.0 times per week     Types: Marijuana         ECG 12 Lead    Date/Time: 1/4/2022 12:22 PM  Performed by: Marta Qureshi MD  Authorized by: Marta Qureshi MD   Comparison: compared with previous ECG   Similar to previous ECG  Rhythm: sinus rhythm  T flattening: III and aVF                 Objective:     Visit Vitals  /80   Pulse 59   Ht 180.3 cm (71\")   Wt 84.4 kg (186 lb)   BMI 25.94 kg/m²         Constitutional:       Appearance: Normal appearance. Well-developed.   HENT:      Head: Normocephalic and atraumatic.   Neck:      Vascular: No carotid bruit or JVD.   Pulmonary:      Effort: Pulmonary effort is normal.      Breath sounds: Normal breath sounds.   Cardiovascular:      Normal rate. Regular rhythm.      No gallop.   Pulses:     Radial: 2+ bilaterally.  Edema:     Peripheral edema absent.   Abdominal:      Palpations: Abdomen is soft.   Skin:     General: Skin is warm and dry. "   Neurological:      Mental Status: Alert and oriented to person, place, and time.             Assessment:          Diagnosis Plan   1. Primary hypertension     2. Diastolic dysfunction without heart failure     3. Vasovagal syncope     4. Stage 3a chronic kidney disease (HCC)            Plan:         1.  Hypertension.  Now well-controlled on 1 antihypertensive medication.  Continue the same.  2.  Diastolic dysfunction.  Noted on prior echocardiogram.  No evidence of volume overload.  I suspect this is also improved with improvement in his blood pressure control.  3.  Vasovagal syncope.  Recent episode of near syncope but fortunately no syncopal episodes.  4.  Chronic kidney disease stage III.  Followed by nephrology.    I will plan on seeing the patient back again in 1 year or sooner if further issues arise.

## 2022-03-15 ENCOUNTER — PREP FOR SURGERY (OUTPATIENT)
Dept: OTHER | Facility: HOSPITAL | Age: 47
End: 2022-03-15

## 2022-03-15 ENCOUNTER — TELEPHONE (OUTPATIENT)
Dept: GASTROENTEROLOGY | Facility: CLINIC | Age: 47
End: 2022-03-15

## 2022-03-15 DIAGNOSIS — R13.19 OTHER DYSPHAGIA: Primary | ICD-10-CM

## 2022-03-15 NOTE — TELEPHONE ENCOUNTER
Called pt and advised of Dr. Romero's note.  Pt verbalized understanding.     Rx called into pt's [facility] pharmacy.     Pt agreeable to EGD, msg sent to Pamela MURILLO to schedule.

## 2022-03-15 NOTE — TELEPHONE ENCOUNTER
----- Message from Tono Romero MD sent at 3/15/2022  8:35 AM EDT -----  Regarding: FW: Acid reflux   Call in Protonix 40 mg p.o. twice daily, #60, 2 refills, case request is in schedule an EGD but if he would rather, talk to Luana he can do that and overbook with her in the next 2 weeks     his choice  ----- Message -----  From: Augustina Ramsey RN  Sent: 3/15/2022   8:12 AM EDT  To: Tono Romero MD  Subject: FW: Acid reflux                                  Dr Romero,  Please see pt message and advise  Thanks  Augustina Ramsey RN    ----- Message -----  From: Sadi Reyes  Sent: 3/15/2022   7:28 AM EDT  To: [facility]  Subject: Acid reflux                                      Have bad reflux, not any heart burn just feel like what I intake is not going all the way down.  I can feel it at the bottom of my throat and I have to try and belch to have any comfort to continue to eat.

## 2022-03-16 ENCOUNTER — TELEPHONE (OUTPATIENT)
Dept: GASTROENTEROLOGY | Facility: CLINIC | Age: 47
End: 2022-03-16

## 2022-03-16 RX ORDER — PANTOPRAZOLE SODIUM 40 MG/1
40 TABLET, DELAYED RELEASE ORAL 2 TIMES DAILY
Qty: 180 TABLET | Refills: 3 | Status: SHIPPED | OUTPATIENT
Start: 2022-03-16 | End: 2022-08-18

## 2022-03-28 ENCOUNTER — TELEPHONE (OUTPATIENT)
Dept: GASTROENTEROLOGY | Facility: CLINIC | Age: 47
End: 2022-03-28

## 2022-03-28 NOTE — TELEPHONE ENCOUNTER
KRISTINE Johns at [facility] via FAX for EGD on 06/22/2022  arrive at 730am  . Mailed Prep instructions to Mailing address on-file.     Advised that [facility] will call with final arrival time minimum 24 hrs before procedure. IF they do not get a phone call, arrival time will stay the same as given on instructions

## 2022-05-11 ENCOUNTER — TELEPHONE (OUTPATIENT)
Dept: GASTROENTEROLOGY | Facility: CLINIC | Age: 47
End: 2022-05-11

## 2022-05-11 NOTE — TELEPHONE ENCOUNTER
----- Message from Sadi Reyes sent at 5/10/2022  9:17 AM EDT -----  Regarding: Short term disability paper work   Good morning, did your office receive paper work from Rahat for my surgery June 22, 2022?

## 2022-05-31 ENCOUNTER — TELEPHONE (OUTPATIENT)
Dept: GASTROENTEROLOGY | Facility: CLINIC | Age: 47
End: 2022-05-31

## 2022-06-22 ENCOUNTER — OUTSIDE FACILITY SERVICE (OUTPATIENT)
Dept: GASTROENTEROLOGY | Facility: CLINIC | Age: 47
End: 2022-06-22

## 2022-06-22 ENCOUNTER — TELEPHONE (OUTPATIENT)
Dept: GASTROENTEROLOGY | Facility: CLINIC | Age: 47
End: 2022-06-22

## 2022-06-22 DIAGNOSIS — R11.2 NAUSEA VOMITING AND DIARRHEA: ICD-10-CM

## 2022-06-22 DIAGNOSIS — R19.7 NAUSEA VOMITING AND DIARRHEA: ICD-10-CM

## 2022-06-22 DIAGNOSIS — R68.81 EARLY SATIETY: Primary | ICD-10-CM

## 2022-06-22 PROCEDURE — 43239 EGD BIOPSY SINGLE/MULTIPLE: CPT | Performed by: INTERNAL MEDICINE

## 2022-06-22 PROCEDURE — 43450 DILATE ESOPHAGUS 1/MULT PASS: CPT | Performed by: INTERNAL MEDICINE

## 2022-06-22 RX ORDER — ESOMEPRAZOLE MAGNESIUM 40 MG/1
40 CAPSULE, DELAYED RELEASE ORAL
Qty: 60 CAPSULE | Refills: 12 | Status: SHIPPED | OUTPATIENT
Start: 2022-06-22

## 2022-06-22 NOTE — TELEPHONE ENCOUNTER
Caller: Sadi Nuñez    Relationship: Self    Best call back number: [phone]    Who are you requesting to speak with (clinical staff, provider,  specific staff member): DAWSON     Do you know the name of the person who called: STEPHANI NUÑEZ    What was the call regarding: RETURNING CALL FROM DAWSON ABOUT HIS SURGERY TODAY    Do you require a callback: YES

## 2022-06-22 NOTE — TELEPHONE ENCOUNTER
RENÉE faxed to [phone]   Appt given  to be seen 6/28/2022  Patient notified-confirmation recvd-

## 2022-06-24 NOTE — TELEPHONE ENCOUNTER
----- Message from Sadi Reyes sent at 6/24/2022 11:14 AM EDT -----  Regarding: Prescription refill   Good morning, can you please refill my prescription thank you.

## 2022-06-28 RX ORDER — ESCITALOPRAM OXALATE 10 MG/1
10 TABLET ORAL DAILY
Qty: 30 TABLET | Refills: 5 | Status: SHIPPED | OUTPATIENT
Start: 2022-06-28 | End: 2022-07-21 | Stop reason: SDUPTHER

## 2022-06-28 NOTE — PROGRESS NOTES
Pathology benign no evidence of EOE, H. pylori or celiac  Please schedule ultrasound and HIDA scan which I have ordered last week but has not been scheduled  Office visit Luana 6 4 weeks

## 2022-06-29 ENCOUNTER — TELEPHONE (OUTPATIENT)
Dept: GASTROENTEROLOGY | Facility: CLINIC | Age: 47
End: 2022-06-29

## 2022-06-29 NOTE — TELEPHONE ENCOUNTER
Caller: Sadi Reyes    Relationship to patient: Self    Best call back number: [phone]    Patient is needing: PT NEEDS A CALL REGARDING SHORT TERM DIABLITY PAPERS

## 2022-06-30 ENCOUNTER — TELEPHONE (OUTPATIENT)
Dept: GASTROENTEROLOGY | Facility: CLINIC | Age: 47
End: 2022-06-30

## 2022-06-30 NOTE — TELEPHONE ENCOUNTER
Provider: NITHYA     Caller: DAWSON  Relationship to Patient: HANDLING FMLA PAPERWORK   Phone Number: [phone]  Reason for Call: CALLED TO INQUIRE ABOUT 'HEALTHCARE PROVIDER CERTIFICATION', STATED IT IS DUE . THIS IS NEEDED TO COVER ADDITIONAL DAYS FOR FMLA    PLEASE LET DAWSON KNOW WHEN COMPLETED, SHE REQUESTED THAT WE PLEASE LEAVE VM WITH PATIENTS NAME AND  IF SHE DOES NOT ANSWER, IT IS A SECURE MAILBOX.

## 2022-07-08 ENCOUNTER — TELEPHONE (OUTPATIENT)
Dept: GASTROENTEROLOGY | Facility: CLINIC | Age: 47
End: 2022-07-08

## 2022-07-08 NOTE — TELEPHONE ENCOUNTER
Caller: Amy Sadi    Relationship: Self    Best call back number: [phone]    Who are you requesting to speak with (clinical staff, provider,  specific staff member): MARIAN MORALES    What was the call regarding: PT HAS AN APPT ON 07.15.22 ALSO US AND HIDA ON 07.21.22. PT WONDERING WHY HE IS COMING BACK PRIOR TO PROCEDURES AND NOT AFTER?    Do you require a callback: YES

## 2022-07-21 ENCOUNTER — HOSPITAL ENCOUNTER (OUTPATIENT)
Dept: NUCLEAR MEDICINE | Facility: HOSPITAL | Age: 47
Discharge: HOME | End: 2022-07-21

## 2022-07-21 ENCOUNTER — HOSPITAL ENCOUNTER (OUTPATIENT)
Dept: ULTRASOUND IMAGING | Facility: HOSPITAL | Age: 47
Discharge: HOME | End: 2022-07-21
Admitting: INTERNAL MEDICINE

## 2022-07-21 DIAGNOSIS — R19.7 NAUSEA VOMITING AND DIARRHEA: ICD-10-CM

## 2022-07-21 DIAGNOSIS — R68.81 EARLY SATIETY: ICD-10-CM

## 2022-07-21 DIAGNOSIS — R11.2 NAUSEA VOMITING AND DIARRHEA: ICD-10-CM

## 2022-07-21 PROCEDURE — 78226 HEPATOBILIARY SYSTEM IMAGING: CPT

## 2022-07-21 PROCEDURE — 76705 ECHO EXAM OF ABDOMEN: CPT

## 2022-07-21 PROCEDURE — 0 TECHNETIUM TC 99M MEBROFENIN KIT: Performed by: INTERNAL MEDICINE

## 2022-07-21 PROCEDURE — A9537 TC99M MEBROFENIN: HCPCS | Performed by: INTERNAL MEDICINE

## 2022-07-21 RX ORDER — ESCITALOPRAM OXALATE 10 MG/1
10 TABLET ORAL DAILY
Qty: 90 TABLET | Refills: 1 | Status: SHIPPED | OUTPATIENT
Start: 2022-07-21 | End: 2023-07-21

## 2022-07-21 RX ORDER — KIT FOR THE PREPARATION OF TECHNETIUM TC 99M MEBROFENIN 45 MG/10ML
1 INJECTION, POWDER, LYOPHILIZED, FOR SOLUTION INTRAVENOUS
Status: COMPLETED | OUTPATIENT
Start: 2022-07-21 | End: 2022-07-21

## 2022-07-21 RX ADMIN — MEBROFENIN 1 DOSE: 45 INJECTION, POWDER, LYOPHILIZED, FOR SOLUTION INTRAVENOUS at 07:00

## 2022-07-25 ENCOUNTER — TELEPHONE (OUTPATIENT)
Dept: GASTROENTEROLOGY | Facility: CLINIC | Age: 47
End: 2022-07-25

## 2022-07-25 NOTE — TELEPHONE ENCOUNTER
----- Message from Tono Romero MD sent at 7/25/2022 12:00 PM EDT -----  Ultrasound was normal, HIDA scan was normal  Office visit with me or Luana in 3 to 4 weeks to discuss further treatment and symptom

## 2022-07-25 NOTE — PROGRESS NOTES
Ultrasound was normal, HIDA scan was normal  Office visit with me or Luana in 3 to 4 weeks to discuss further treatment and symptom

## 2022-08-18 ENCOUNTER — OFFICE VISIT (OUTPATIENT)
Dept: GASTROENTEROLOGY | Facility: CLINIC | Age: 47
End: 2022-08-18

## 2022-08-18 VITALS — BODY MASS INDEX: 25.2 KG/M2 | TEMPERATURE: 96.8 F | WEIGHT: 180 LBS | HEIGHT: 71 IN

## 2022-08-18 DIAGNOSIS — Z12.11 ENCOUNTER FOR SCREENING FOR MALIGNANT NEOPLASM OF COLON: ICD-10-CM

## 2022-08-18 DIAGNOSIS — K21.9 GASTROESOPHAGEAL REFLUX DISEASE, UNSPECIFIED WHETHER ESOPHAGITIS PRESENT: Primary | ICD-10-CM

## 2022-08-18 PROCEDURE — 99214 OFFICE O/P EST MOD 30 MIN: CPT | Performed by: NURSE PRACTITIONER

## 2022-08-18 NOTE — PROGRESS NOTES
Chief Complaint   Patient presents with   • Heartburn       HPI    Sadi Reyes is a  46 y.o. male here with a history of erosive gastritis for a follow up visit for heartburn.    This patient follows with Dr. Romero and myself.    Reviewed EGD dated 6/22/2022 normal findings however empiric dilation of the esophagus was performed.  Use Nexium p.o. twice daily.    Subsequent gallbladder ultrasound and HIDA scan were normal.    Today he reports feeling quite well.  No further issues with dyspepsia, abdominal pain, nausea, vomiting on twice daily dosing Nexium.  He admits he does not follow an antireflux diet.  No dysphagia or odynophagia.  Appetite is excellent.  His weight is stable.    No lower GI complaints.    Based on his age he will be due for screening colonoscopy before the end of the year.    Past Medical History:   Diagnosis Date   • Acute nephritis    • Acute pyelonephritis    • VANESSA (acute kidney injury) (HCC)    • Carpal tunnel syndrome    • Cluster headache    • Epigastric pain     epigastric/chest pain   • Headache    • Headache, tension-type    • Hyperlipidemia    • Hypertension    • Migraine    • Palpitations    • Syncope        Past Surgical History:   Procedure Laterality Date   • BUNIONECTOMY     • ENDOSCOPY N/A 11/2/2020    Procedure: ESOPHAGOGASTRODUODENOSCOPY with biopsies;  Surgeon: Tono Romero MD;  Location: [facility] ENDOSCOPY;  Service: Gastroenterology;  Laterality: N/A;  Pre: epigastric pain  Post: duodenitis, gastritis, hiatal hernia   • FOOT SURGERY     • HERNIA REPAIR         Scheduled Meds:     Continuous Infusions: No current facility-administered medications for this visit.      PRN Meds:     Allergies   Allergen Reactions   • Inderal La [Propranolol] Diarrhea and Nausea And Vomiting       Social History     Socioeconomic History   • Marital status:    Tobacco Use   • Smoking status: Never Smoker   • Smokeless tobacco: Never Used   • Tobacco comment: rare caffeine    Substance and Sexual Activity   • Alcohol use: Yes     Comment: seldom -  1 x weekly   • Drug use: Yes     Frequency: 1.0 times per week     Types: Marijuana   • Sexual activity: Yes     Partners: Female       Family History   Problem Relation Age of Onset   • Hypertension Mother    • No Known Problems Father    • No Known Problems Sister    • Prostate cancer Maternal Grandfather        Review of Systems   Constitutional: Negative for activity change, appetite change, fatigue, fever and unexpected weight change.   HENT: Negative for trouble swallowing.    Respiratory: Negative for apnea, cough, choking, chest tightness, shortness of breath and wheezing.    Cardiovascular: Negative for chest pain, palpitations and leg swelling.   Gastrointestinal: Negative for abdominal distention, abdominal pain, anal bleeding, blood in stool, constipation, diarrhea, nausea, rectal pain and vomiting.       Vitals:    08/18/22 1036   Temp: 96.8 °F (36 °C)       Physical Exam  Constitutional:       Appearance: He is well-developed.   Abdominal:      General: Bowel sounds are normal. There is no distension.      Palpations: Abdomen is soft. There is no mass.      Tenderness: There is no abdominal tenderness. There is no guarding.      Hernia: No hernia is present.   Skin:     General: Skin is warm and dry.      Capillary Refill: Capillary refill takes less than 2 seconds.   Neurological:      Mental Status: He is alert and oriented to person, place, and time.   Psychiatric:         Behavior: Behavior normal.     Assessment    Diagnoses and all orders for this visit:    1. Gastroesophageal reflux disease, unspecified whether esophagitis present (Primary)    2. Encounter for screening for malignant neoplasm of colon  -     Case Request; Standing  -     Case Request    Plan    Pleasant 46-year-old male seen today in follow-up.  Reviewed recent EGD findings with the patient as well as pathology all questions were answered.  Patient  asymptomatic currently on twice daily dosing Nexium.  Recommend he continue twice daily dosing for a total of 3 months and decrease to once daily dosing.  Adhere to an antireflux diet.    Antireflux measures and dietary modifications reviewed. Low acid diet reviewed. Keep head of bed elevated. Stop eating/drinking at least 3 hours prior to bedtime. Eliminate caffeine and carbonated beverages.     Arrange screening colonoscopy with Dr. Romero before the end of the year.            CHARI Box  [facility], [place], operated by [facility]  [address] [place] [address]  Office: [phone]    I spent 30 minutes caring for Sadi on this date of service. This time includes time spent by me in the following activities: preparing for the visit, reviewing tests, obtaining and/or reviewing a separately obtained history, performing a medically appropriate examination and/or evaluation , counseling and educating the patient/family/caregiver, ordering medications, tests, or procedures, documenting information in the medical record, independently interpreting results and communicating that information with the patient/family/caregiver and care coordination.

## 2022-09-19 ENCOUNTER — TELEPHONE (OUTPATIENT)
Dept: GASTROENTEROLOGY | Facility: CLINIC | Age: 47
End: 2022-09-19

## 2022-09-19 PROBLEM — Z12.11 ENCOUNTER FOR SCREENING FOR MALIGNANT NEOPLASM OF COLON: Status: ACTIVE | Noted: 2022-09-19

## 2022-09-19 NOTE — TELEPHONE ENCOUNTER
KRISTINE Bhagat for COLONOSCOPY  on 11/17/22 arrive at 115pm.Prep instructions mailed to address on file.

## 2022-10-20 ENCOUNTER — TELEPHONE (OUTPATIENT)
Dept: GASTROENTEROLOGY | Facility: CLINIC | Age: 47
End: 2022-10-20

## 2022-10-27 ENCOUNTER — OFFICE VISIT (OUTPATIENT)
Dept: ORTHOPEDIC SURGERY | Facility: CLINIC | Age: 47
End: 2022-10-27

## 2022-10-27 VITALS — HEIGHT: 71 IN | TEMPERATURE: 96.8 F | WEIGHT: 175 LBS | BODY MASS INDEX: 24.5 KG/M2

## 2022-10-27 DIAGNOSIS — M54.2 NECK PAIN: Primary | ICD-10-CM

## 2022-10-27 PROCEDURE — 99204 OFFICE O/P NEW MOD 45 MIN: CPT | Performed by: ORTHOPAEDIC SURGERY

## 2022-10-27 PROCEDURE — 73030 X-RAY EXAM OF SHOULDER: CPT | Performed by: ORTHOPAEDIC SURGERY

## 2022-10-27 PROCEDURE — 72040 X-RAY EXAM NECK SPINE 2-3 VW: CPT | Performed by: ORTHOPAEDIC SURGERY

## 2022-10-27 RX ORDER — METHYLPREDNISOLONE 4 MG/1
TABLET ORAL
Qty: 21 TABLET | Refills: 0 | Status: SHIPPED | OUTPATIENT
Start: 2022-10-27 | End: 2022-11-17 | Stop reason: HOSPADM

## 2022-10-27 NOTE — PROGRESS NOTES
New  Right Shoulder      Patient: Sadi Reyes        YOB: 1975    Medical Record Number: [id number]        Chief Complaints: Right shoulder and neck pain      History of Present Illness: This is a 47-year-old male who works at [employer] who is right-hand dominant presents with right shoulder pain he states is in the back of his shoulder really his neck up into his neck states he works at [employer] he was pushing a fridge and it came back to him so he had essentially catch it felt a pop in his shoulders had pain since that time he has been working but he has night pain but night is his worst time.  It is really neck pain down the posterior aspect of his arm he is never had any this before.  His current symptoms are moderate intermittent aching throbbing stabbing shooting worse with sitting working driving some somewhat better with rest except for tonight and is laying down that it is worse.  His past medical history is remarkable for kidney injury hyperlipidemia hypertension    Allergies:   Allergies   Allergen Reactions   • Inderal La [Propranolol] Diarrhea and Nausea And Vomiting       Medications:   Home Medications:  Current Outpatient Medications on File Prior to Visit   Medication Sig   • amLODIPine (NORVASC) 10 MG tablet Take 1 tablet by mouth Daily.   • atorvastatin (LIPITOR) 20 MG tablet TAKE 1 TABLET BY MOUTH EVERY DAY   • esomeprazole (nexIUM) 40 MG capsule Take 1 capsule by mouth 2 (Two) Times a Day Before Meals.   • escitalopram (Lexapro) 10 MG tablet Take 1 tablet by mouth Daily.   • ubrogepant (ubrogepant) 50 MG tablet Take 1 tablet by mouth As Needed (headache).     No current facility-administered medications on file prior to visit.     Current Medications:  Scheduled Meds:  Continuous Infusions:No current facility-administered medications for this visit.    PRN Meds:.    Past Medical History:   Diagnosis Date   • Acute nephritis    • Acute pyelonephritis    • VANESSA (acute kidney injury) ([facility])   "  • Carpal tunnel syndrome    • Cluster headache    • Epigastric pain     epigastric/chest pain   • Headache    • Headache, tension-type    • Hyperlipidemia    • Hypertension    • Migraine    • Palpitations    • Syncope         Past Surgical History:   Procedure Laterality Date   • BUNIONECTOMY     • ENDOSCOPY N/A 11/2/2020    Procedure: ESOPHAGOGASTRODUODENOSCOPY with biopsies;  Surgeon: Tono Romero MD;  Location: [facility] ENDOSCOPY;  Service: Gastroenterology;  Laterality: N/A;  Pre: epigastric pain  Post: duodenitis, gastritis, hiatal hernia   • FOOT SURGERY     • HERNIA REPAIR          Social History     Occupational History   • Not on file   Tobacco Use   • Smoking status: Never   • Smokeless tobacco: Never   • Tobacco comments:     rare caffeine   Vaping Use   • Vaping Use: Never used   Substance and Sexual Activity   • Alcohol use: Yes     Comment: seldom -  1 x weekly   • Drug use: Yes     Frequency: 1.0 times per week     Types: Marijuana   • Sexual activity: Yes     Partners: Female      Social History     Social History Narrative   • Not on file        Family History   Problem Relation Age of Onset   • Hypertension Mother    • No Known Problems Father    • No Known Problems Sister    • Prostate cancer Maternal Grandfather              Review of Systems:     Review of Systems      Physical Exam: 47 y.o. male  General Appearance:    Alert, cooperative, in no acute distress                   Vitals:    10/27/22 1513   Temp: 96.8 °F (36 °C)   Weight: 79.4 kg (175 lb)   Height: 180.3 cm (71\")   PainSc:   7      Patient is alert and read ×3 no acute distress appears her above-listed at height weight and age.  Affect is normal respiratory rate is normal unlabored. Heart rate regular rate rhythm, sclera, dentition and hearing are normal for the purpose of this exam.    Ortho Exam  Physical exam of the right patient can actively flex to 180, abduction is similar external rotation is to 50, internal rotation to " the upper lumbar spine.  Rotator cuff strength is 4+ to 5 over 5 with isometric strength testing without symptoms.  The patient decrease in cervical range of motion in all directions this does reproduce his pain and the pain goes down posterior aspect of his right arm to his elbow is otherwise neurologically intact cardiomedial muscle test which is 5/5 sensations intact patient has good distal pulses  Procedures          Radiology:   AP, Scapular Y and Axillary Lateral of the right shoulder were ordered/reviewed to evauate shoulder pain.  I have no comparative films he has acromioclavicular arthritis otherwise normal.  Also took AP and lateral cervical spineI have no comparative films he has good maintenance of his disc base and normal lordosis  Imaging Results (Most Recent)     Procedure Component Value Units Date/Time    XR Shoulder 2+ View Right [[id number]] Resulted: 10/27/22 1645     Updated: 10/27/22 1646    Impression:      Ordering physician's impression is located in the Encounter Note dated 10/27/22. X-ray performed in the DR room.     XR Spine Cervical 2 View [[id number]] Resulted: 10/27/22 1645     Updated: 10/27/22 1646    Impression:      Ordering physician's impression is located in the Encounter Note dated 10/27/22. X-ray performed in the DR room.         Assessment/Plan: Right shoulder arm and neck pain I really think this is coming from his neck and is to put him on a steroid Dosepak he will check with his kidney doctor to make sure he can do that we will start him into physical therapy if he fails that we will get an MRI and probably refer him to one of the spine guys

## 2022-11-02 ENCOUNTER — TREATMENT (OUTPATIENT)
Dept: PHYSICAL THERAPY | Facility: CLINIC | Age: 47
End: 2022-11-02

## 2022-11-02 DIAGNOSIS — M25.511 RIGHT SHOULDER PAIN, UNSPECIFIED CHRONICITY: ICD-10-CM

## 2022-11-02 DIAGNOSIS — M54.2 PAIN, NECK: Primary | ICD-10-CM

## 2022-11-02 PROCEDURE — 97161 PT EVAL LOW COMPLEX 20 MIN: CPT | Performed by: PHYSICAL THERAPIST

## 2022-11-02 NOTE — PROGRESS NOTES
Physical Therapy Initial Evaluation and Plan of Care  [address], [address]  [place] [address]    Patient: Sadi Reyes   : 1975  Diagnosis/ICD-10 Code:  Pain, neck [M54.2]  Referring practitioner: Gardenia Norris MD  Date of Initial Visit: 2022  Today's Date: 2022  Patient seen for 1 session         Visit Diagnoses:    ICD-10-CM ICD-9-CM   1. Pain, neck  M54.2 723.1   2. Right shoulder pain, unspecified chronicity  M25.511 719.41         Subjective Questionnaire: NDI: 13      Subjective Evaluation    History of Present Illness  Date of onset: 2022  Mechanism of injury: Patient reports that he was pushing refrigerators forward and they got stuck and cam back a little.  Patient reports feeling a pop in his shoulder.  No history of neck or shoulder injuries.      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    Active Range of Motion   Cervical/Thoracic Spine   Cervical    Flexion: WFL  Extension: WFL  Left lateral flexion: Neck active lateral bend left: about 20.   Right lateral flexion: Neck active lateral bend right: about 20.     Right Shoulder   Flexion: Right shoulder active forward flexion: about 105. with pain  Abduction: Right shoulder active abduction: about 110. with pain  External rotation 0°: Right shoulder active external rotation at 0 degrees: WNL.   Internal rotation BTB: L3     Strength/Myotome Testing     Right Shoulder     Planes of Motion   Flexion: 4+   Abduction: 4+    External rotation at 0°: 4   Internal rotation at 0°: 4     Right Elbow   Flexion: 4+  Extension: 4+    Tests   Cervical     Right   Negative active compression ([place]).     Right Shoulder   Positive empty can and Hawkin's.           Assessment & Plan     Assessment  Impairments: abnormal or restricted ROM, activity intolerance, impaired physical strength, lacks appropriate home exercise program and pain with function    Assessment details: Patient presents with c/o pain, TTP, limited AROM, decreased strength and positive special testing which is limiting his ability to perform ADL'S.    Barriers to therapy: none  Prognosis: good  Prognosis details: STG's to be met by 2 weeks  1)  Independent with HEP  2)  Decrease pain by 50% or more  3)  AROM WNL for the cervical spine  4)  AROM WNL for the right shoulder to allow for [place] activity    LTG's to be met by 4 weeks  1)  Independent with HEP progression  2)  Decrease pain by 75% or more  3)  Increase strength for the right shoulder to 4+/5  4)  Negative special testing  5)  No TTP present  6)  Patient to perform ADL'S without limitations       Plan  Therapy options: will be seen for skilled therapy services  Planned therapy interventions: strengthening, stretching, therapeutic activities, home exercise program, manual therapy and neuromuscular re-education  Frequency: 2x week  Duration in weeks: 4  Treatment plan discussed with: patient            Timed:         Manual Therapy:    0     mins  91265;     Therapeutic Exercise:    0     mins  91158;     Neuromuscular Melvin:    0    mins  61417;    Therapeutic Activity:     0     mins  47828;     Gait Trainin     mins  44794;     Ultrasound:     0     mins  72864;          Un-Timed:  Electrical Stimulation:    0     mins  95766 ( );    Low Eval     20     Mins  27102  Mod Eval     0     Mins  86768  High Eval                       0     Mins  43428        Timed Treatment:   0   mins   Total Treatment:      20   mins          PT: Vahe Nieto, PT     [place] License [id number]  Electronically signed by Vahe Nieto, PT, 11/02/22, 3:37 PM EDT    Certification Period: 11/2/2022 thru 1/30/2023  I certify that the therapy services are furnished while this patient is under my care.  The services outlined above are required by this patient, and will be reviewed every 90 days.    Gardenia Norris Md  [address] [place] [address]   NPI: [id number]      Vahe Nieto, PT   License number: [id number]        Physician Signature:__________________________________________________    PHYSICIAN: Gardenia Norris MD      DATE:     Please sign and return via fax to [email or web address] . Thank you, [facility].

## 2022-11-17 ENCOUNTER — ANESTHESIA EVENT (OUTPATIENT)
Dept: GASTROENTEROLOGY | Facility: HOSPITAL | Age: 47
End: 2022-11-17

## 2022-11-17 ENCOUNTER — HOSPITAL ENCOUNTER (OUTPATIENT)
Facility: HOSPITAL | Age: 47
Setting detail: HOSPITAL OUTPATIENT SURGERY
Discharge: HOME | End: 2022-11-17
Attending: INTERNAL MEDICINE | Admitting: INTERNAL MEDICINE

## 2022-11-17 ENCOUNTER — ANESTHESIA (OUTPATIENT)
Dept: GASTROENTEROLOGY | Facility: HOSPITAL | Age: 47
End: 2022-11-17

## 2022-11-17 VITALS
RESPIRATION RATE: 16 BRPM | BODY MASS INDEX: 23.85 KG/M2 | OXYGEN SATURATION: 100 % | DIASTOLIC BLOOD PRESSURE: 75 MMHG | WEIGHT: 171 LBS | HEART RATE: 58 BPM | SYSTOLIC BLOOD PRESSURE: 115 MMHG

## 2022-11-17 PROCEDURE — 45378 DIAGNOSTIC COLONOSCOPY: CPT | Performed by: INTERNAL MEDICINE

## 2022-11-17 PROCEDURE — S0260 H&P FOR SURGERY: HCPCS | Performed by: INTERNAL MEDICINE

## 2022-11-17 PROCEDURE — 25010000002 PROPOFOL 10 MG/ML EMULSION: Performed by: ANESTHESIOLOGY

## 2022-11-17 RX ORDER — LIDOCAINE HYDROCHLORIDE 20 MG/ML
INJECTION, SOLUTION INFILTRATION; PERINEURAL AS NEEDED
Status: DISCONTINUED | OUTPATIENT
Start: 2022-11-17 | End: 2022-11-17 | Stop reason: SURG

## 2022-11-17 RX ORDER — PROPOFOL 10 MG/ML
VIAL (ML) INTRAVENOUS AS NEEDED
Status: DISCONTINUED | OUTPATIENT
Start: 2022-11-17 | End: 2022-11-17 | Stop reason: SURG

## 2022-11-17 RX ORDER — SODIUM CHLORIDE, SODIUM LACTATE, POTASSIUM CHLORIDE, CALCIUM CHLORIDE 600; 310; 30; 20 MG/100ML; MG/100ML; MG/100ML; MG/100ML
30 INJECTION, SOLUTION INTRAVENOUS CONTINUOUS PRN
Status: DISCONTINUED | OUTPATIENT
Start: 2022-11-17 | End: 2022-11-17 | Stop reason: HOSPADM

## 2022-11-17 RX ADMIN — LIDOCAINE HYDROCHLORIDE 60 MG: 20 INJECTION, SOLUTION INFILTRATION; PERINEURAL at 09:49

## 2022-11-17 RX ADMIN — PROPOFOL 80 MG: 10 INJECTION, EMULSION INTRAVENOUS at 09:49

## 2022-11-17 RX ADMIN — PROPOFOL 200 MCG/KG/MIN: 10 INJECTION, EMULSION INTRAVENOUS at 09:51

## 2022-11-17 RX ADMIN — SODIUM CHLORIDE, POTASSIUM CHLORIDE, SODIUM LACTATE AND CALCIUM CHLORIDE 30 ML/HR: 600; 310; 30; 20 INJECTION, SOLUTION INTRAVENOUS at 09:28

## 2022-11-17 NOTE — H&P
[facility]  Pre Procedure History & Physical    Chief Complaint:   Time for my colonoscopy    Subjective     HPI:   47 y.o. male presenting to endoscopy unit today for  colonoscopy.    Past Medical History:   Past Medical History:   Diagnosis Date   • Acute nephritis    • Acute pyelonephritis    • VANESSA (acute kidney injury) (HCC)    • Carpal tunnel syndrome    • Cluster headache    • Epigastric pain     epigastric/chest pain   • Headache    • Headache, tension-type    • Hyperlipidemia    • Hypertension    • Migraine    • Palpitations    • Syncope        Family History:  Family History   Problem Relation Age of Onset   • Hypertension Mother    • No Known Problems Father    • No Known Problems Sister    • Prostate cancer Maternal Grandfather        Social History:   reports that he has never smoked. He has never used smokeless tobacco. He reports current alcohol use. He reports current drug use. Frequency: 7.00 times per week. Drug: Marijuana.    Medications:   Medications Prior to Admission   Medication Sig Dispense Refill Last Dose   • amLODIPine (NORVASC) 10 MG tablet Take 1 tablet by mouth Daily. 30 tablet 4 11/17/2022   • atorvastatin (LIPITOR) 20 MG tablet TAKE 1 TABLET BY MOUTH EVERY DAY 90 tablet 0 11/16/2022   • esomeprazole (nexIUM) 40 MG capsule Take 1 capsule by mouth 2 (Two) Times a Day Before Meals. 60 capsule 12 Past Month   • escitalopram (Lexapro) 10 MG tablet Take 1 tablet by mouth Daily. 90 tablet 1 More than a month   • methylPREDNISolone (MEDROL) 4 MG dose pack Use as directed by package instructions 21 tablet 0    • ubrogepant (ubrogepant) 50 MG tablet Take 1 tablet by mouth As Needed (headache). 10 tablet 1        Allergies:  Inderal la [propranolol]    ROS:    Pertinent items are noted in HPI     Objective     Blood pressure 133/86, pulse 72, resp. rate 16, weight 77.6 kg (171 lb), SpO2 99 %.    Physical Exam   Constitutional: Pt is oriented to person, place, and time  and well-developed, well-nourished, and in no distress.   Abdominal: Soft.   Psychiatric: Mood, memory, affect and judgment normal.     Assessment & Plan     Diagnosis:  screening    Anticipated Surgical Procedure:  Colonoscopy    The risks, benefits, and alternatives of this procedure have been discussed with the patient or the responsible party- the patient understands and agrees to proceed.

## 2022-11-17 NOTE — ANESTHESIA POSTPROCEDURE EVALUATION
Patient: Sadi Graves    Procedure Summary     Date: 11/17/22 Room / Location:  [facility] /  [facility]    Anesthesia Start: 0948 Anesthesia Stop: 1010    Procedure: COLONOSCOPY TO CECUM AND TERMINAL ILEUM Diagnosis:       Encounter for screening for malignant neoplasm of colon      (Encounter for screening for malignant neoplasm of colon [Z12.11])    Surgeons: Tono Romero MD Provider: Augustina Magana MD    Anesthesia Type: MAC ASA Status: 2          Anesthesia Type: MAC    Vitals  No vitals data found for the desired time range.          Post Anesthesia Care and Evaluation    Patient location during evaluation: bedside  Patient participation: complete - patient participated  Level of consciousness: awake  Pain management: adequate    Airway patency: patent  Anesthetic complications: No anesthetic complications    Cardiovascular status: acceptable  Respiratory status: acceptable  Hydration status: acceptable

## 2022-11-17 NOTE — ANESTHESIA PREPROCEDURE EVALUATION
Anesthesia Evaluation                  Airway   Mallampati: I  TM distance: >3 FB  Neck ROM: full  No difficulty expected  Dental - normal exam     Pulmonary - normal exam   Cardiovascular - normal exam    (+) hypertension, hyperlipidemia,       Neuro/Psych  GI/Hepatic/Renal/Endo    (+)   renal disease,     Musculoskeletal     Abdominal  - normal exam    Bowel sounds: normal.   Substance History      OB/GYN          Other                        Anesthesia Plan    ASA 2     MAC     intravenous induction     Anesthetic plan, risks, benefits, and alternatives have been provided, discussed and informed consent has been obtained with: patient.        CODE STATUS:

## 2022-11-17 NOTE — DISCHARGE INSTRUCTIONS

## 2022-12-02 NOTE — PROGRESS NOTES
Patient: Sadi Reyes  YOB: 1975  Date of Service: 12/2/2022    Chief Complaints: Bilateral shoulder and neck pain    Subjective:    History of Present Illness: Pt is seen in the office today with complaints of bilateral shoulder and neck pain really thought it was more neck when I saw him in October start him on a Dosepak which he states helped immensely we had him see physical therapy they were able to do the initial eval however work comp never got back to them as far as to how many they would cover.  At this point he is only seeing therapy that 1 visit his pain is now in both shoulders and in his neck he is able to do his job but still pretty significant.          Allergies:   Allergies   Allergen Reactions   • Inderal La [Propranolol] Diarrhea and Nausea And Vomiting       Medications:   Home Medications:  Current Outpatient Medications on File Prior to Visit   Medication Sig   • amLODIPine (NORVASC) 10 MG tablet Take 1 tablet by mouth Daily.   • atorvastatin (LIPITOR) 20 MG tablet TAKE 1 TABLET BY MOUTH EVERY DAY   • escitalopram (Lexapro) 10 MG tablet Take 1 tablet by mouth Daily.   • esomeprazole (nexIUM) 40 MG capsule Take 1 capsule by mouth 2 (Two) Times a Day Before Meals.     No current facility-administered medications on file prior to visit.     Current Medications:  Scheduled Meds:  Continuous Infusions:No current facility-administered medications for this visit.    PRN Meds:.    I have reviewed the patient's medical history in detail and updated the computerized patient record.  Review and summarization of old records include:    Past Medical History:   Diagnosis Date   • Acute nephritis    • Acute pyelonephritis    • VANESSA (acute kidney injury) (HCC)    • Carpal tunnel syndrome    • Cluster headache    • Epigastric pain     epigastric/chest pain   • Headache    • Headache, tension-type    • Hyperlipidemia    • Hypertension    • Migraine    • Palpitations    • Syncope         Past  Surgical History:   Procedure Laterality Date   • BUNIONECTOMY     • COLONOSCOPY N/A 11/17/2022    Procedure: COLONOSCOPY TO CECUM AND TERMINAL ILEUM;  Surgeon: Tono Romero MD;  Location: [facility] ENDOSCOPY;  Service: Gastroenterology;  Laterality: N/A;  PRE- SCREENING  POST- HEMORRHOIDS   • ENDOSCOPY N/A 11/2/2020    Procedure: ESOPHAGOGASTRODUODENOSCOPY with biopsies;  Surgeon: Tono Romero MD;  Location: [facility] ENDOSCOPY;  Service: Gastroenterology;  Laterality: N/A;  Pre: epigastric pain  Post: duodenitis, gastritis, hiatal hernia   • FOOT SURGERY     • HERNIA REPAIR          Social History     Occupational History   • Not on file   Tobacco Use   • Smoking status: Never   • Smokeless tobacco: Never   • Tobacco comments:     rare caffeine   Vaping Use   • Vaping Use: Never used   Substance and Sexual Activity   • Alcohol use: Yes     Comment: seldom -  1 x weekly   • Drug use: Yes     Frequency: 7.0 times per week     Types: Marijuana     Comment: uses daily   • Sexual activity: Yes     Partners: Female      Social History     Social History Narrative   • Not on file        Family History   Problem Relation Age of Onset   • Hypertension Mother    • No Known Problems Father    • No Known Problems Sister    • Prostate cancer Maternal Grandfather        ROS: 14 point review of systems was performed and was negative except for documented findings in HPI and today's encounter.     Allergies:   Allergies   Allergen Reactions   • Inderal La [Propranolol] Diarrhea and Nausea And Vomiting     Constitutional:  Denies fever, shaking or chills   Eyes:  Denies change in visual acuity   HENT:  Denies nasal congestion or sore throat   Respiratory:  Denies cough or shortness of breath   Cardiovascular:  Denies chest pain or severe LE edema   GI:  Denies abdominal pain, nausea, vomiting, bloody stools or diarrhea   Musculoskeletal:  Numbness, tingling, or loss of motor function only as noted above in history of present  illness.  : Denies painful urination or hematuria  Integument:  Denies rash, lesion or ulceration   Neurologic:  Denies headache or focal weakness  Endocrine:  Denies lymphadenopathy  Psych:  Denies confusion or change in mental status   Hem:  Denies active bleeding      Physical Exam: 47 y.o. male  Wt Readings from Last 3 Encounters:   11/17/22 77.6 kg (171 lb)   10/27/22 79.4 kg (175 lb)   08/18/22 81.6 kg (180 lb)       There is no height or weight on file to calculate BMI.  No height and weight on file for this encounter.  There were no vitals filed for this visit.  Vital signs reviewed.   General Appearance:    Alert, cooperative, in no acute distress                    Ortho exam    Physical exam of the left and right shoulder reveals no overlying skin changes no lymphedema no lymphadenopathy.  The patient can actively flex to 180, abduction is similar external rotation is to 50, internal rotation to the upper lumbar spine.  Rotator cuff strength is 4+ to 5 over 5 with isometric strength testing without symptoms.  He does have decreased cervical range of motion particularly sidebending and rotation that does reproduce his symptoms patient has good distal pulses         .time    Assessment: Bilateral shoulder pain and neck pain I really think the source is his neck I am disappointed they did not approve more therapy visits.  My plan would be to obtain a CT scan as he cannot have an MRI and I would have him see Arlen for further neck evaluation he can continue working without restriction  Plan:   Follow up as indicated.  Ice, elevate, and rest as needed.  Discussed conservative measures of pain control including ice, bracing.   Gardenia Norris M.D.

## 2022-12-05 ENCOUNTER — OFFICE VISIT (OUTPATIENT)
Dept: ORTHOPEDIC SURGERY | Facility: CLINIC | Age: 47
End: 2022-12-05

## 2022-12-05 VITALS — HEIGHT: 71 IN | TEMPERATURE: 97.6 F | WEIGHT: 177.5 LBS | BODY MASS INDEX: 24.85 KG/M2

## 2022-12-05 DIAGNOSIS — M50.20 HNP (HERNIATED NUCLEUS PULPOSUS), CERVICAL: Primary | ICD-10-CM

## 2022-12-05 PROCEDURE — 99213 OFFICE O/P EST LOW 20 MIN: CPT | Performed by: ORTHOPAEDIC SURGERY

## 2022-12-09 ENCOUNTER — TREATMENT (OUTPATIENT)
Dept: PHYSICAL THERAPY | Facility: CLINIC | Age: 47
End: 2022-12-09

## 2022-12-09 DIAGNOSIS — M25.511 RIGHT SHOULDER PAIN, UNSPECIFIED CHRONICITY: ICD-10-CM

## 2022-12-09 DIAGNOSIS — M54.2 PAIN, NECK: Primary | ICD-10-CM

## 2022-12-09 PROCEDURE — 97110 THERAPEUTIC EXERCISES: CPT | Performed by: PHYSICAL THERAPIST

## 2022-12-09 NOTE — PROGRESS NOTES
"Physical Therapy Daily Treatment Note  [address], [address]  [place] [address]  Visit # 2      Subjective Evaluation    History of Present Illness    Subjective comment: Pt reports that his shoulder is \"rubio tight..just a little bit of pain.\"       Objective   See Exercise, Manual, and Modality Logs for complete treatment.       Assessment & Plan     Assessment    Assessment details: Pt completed treatment with mild c/o's of pain. Pt had c/o pain in (R) bicep following hitch hiker (shoulder ER).  Pt tolerated the rest of his exercise with no issue.  Pt's HEP was printed, explained, and given to him.    Pt has been referred for a MRI, but doesn't have a date yet.                     Manual Therapy:    0     mins  65990;  Therapeutic Exercise:    25     mins  29546;     Neuromuscular Melvin:    0    mins  45183;    Therapeutic Activity:     0     mins  65184;     Gait Trainin     mins  80142;     Ultrasound:     0     mins  06988;    Work Hardening           0      mins 80497  Iontophoresis               0   mins 01793  E-Stim                          _0_ mins 28702 ( )    Timed Treatment:   25   mins   Total Treatment:     25   mins    Ye Bonilla PTA  Physical Therapist Assistant  "

## 2022-12-13 ENCOUNTER — TREATMENT (OUTPATIENT)
Dept: PHYSICAL THERAPY | Facility: CLINIC | Age: 47
End: 2022-12-13

## 2022-12-13 DIAGNOSIS — M54.2 PAIN, NECK: Primary | ICD-10-CM

## 2022-12-13 DIAGNOSIS — M25.511 RIGHT SHOULDER PAIN, UNSPECIFIED CHRONICITY: ICD-10-CM

## 2022-12-13 PROCEDURE — 97110 THERAPEUTIC EXERCISES: CPT | Performed by: PHYSICAL THERAPIST

## 2022-12-13 PROCEDURE — 97112 NEUROMUSCULAR REEDUCATION: CPT | Performed by: PHYSICAL THERAPIST

## 2022-12-13 NOTE — PROGRESS NOTES
Physical Therapy Daily Treatment Note  [address], [address]  [place] [address]      Patient: Sadi Graves   : 1975  Referring practitioner: Gardenia Norris MD  Date of Initial Visit: Type: THERAPY  Noted: 2022  Today's Date: 2022  Patient seen for 3 sessions       Visit Diagnoses:    ICD-10-CM ICD-9-CM   1. Pain, neck  M54.2 723.1   2. Right shoulder pain, unspecified chronicity  M25.511 719.41           Subjective   Patient reports pain in the neck and shoulder rated at 6/10.    Objective          Active Range of Motion     Right Shoulder   Flexion: Right shoulder active forward flexion: about 150.   Abduction: Right shoulder active abduction: about 150.   External rotation 0°: Right shoulder active external rotation at 0 degrees: WNL.     Additional Active Range of Motion Details  Cervical AROM WNL      See Exercise, Manual, and Modality Logs for complete treatment.       Assessment/Plan  Subjective reports of pain are increased from the eval (5/10).  Added KT to the right shoulder to help with the S/S's.  AROM for the right shoulder and cervical spine is improved from the evaluation, but continues to be limited.  Added shoulder flexion/acaption/abduction, in addition to increasing the previous strengthening exercises.  Patient performed the routine without a significant increase in pain.      Timed:         Manual Therapy:    0     mins  28124;     Therapeutic Exercise:    24     mins  76706;     Neuromuscular Melvin:    8    mins  99015;    Therapeutic Activity:     0     mins  66207;     Gait Training      0    mins  01109;  Work Conditioning     0   mins  27805       Untimed:  Electrical Stimulation:    0     mins  93683 ( );      Timed Treatment:   32   mins   Total Treatment:     32   mins    Vahe Nieto, PT  [place] License: [id number]

## 2022-12-16 ENCOUNTER — TREATMENT (OUTPATIENT)
Dept: PHYSICAL THERAPY | Facility: CLINIC | Age: 47
End: 2022-12-16

## 2022-12-16 DIAGNOSIS — M54.2 PAIN, NECK: Primary | ICD-10-CM

## 2022-12-16 DIAGNOSIS — M25.511 RIGHT SHOULDER PAIN, UNSPECIFIED CHRONICITY: ICD-10-CM

## 2022-12-16 PROCEDURE — 97110 THERAPEUTIC EXERCISES: CPT | Performed by: PHYSICAL THERAPIST

## 2022-12-16 NOTE — PROGRESS NOTES
Physical Therapy Daily Treatment Note  [address], [address]  [place] [address]  Visit # 4      Subjective Evaluation    History of Present Illness    Subjective comment: Pt reports he had sharp pain shooting through his shoulder last night.       Objective   See Exercise, Manual, and Modality Logs for complete treatment.   Added Bent row, and prone I    Assessment & Plan     Assessment    Assessment details: Pt completed treatment with no increase in (R) shoulder pain.  Pt benefited from vc to retract his scapula when performing AROM shoulder flexion/scaption.  Pt tolerated the addition of prone I's and bent rows with no issue.                     Manual Therapy:    0     mins  38700;  Therapeutic Exercise:    24     mins  25252;     Neuromuscular Melvin:    0    mins  35454;    Therapeutic Activity:     0     mins  90230;     Gait Trainin     mins  27740;     Ultrasound:     0     mins  37056;    Work Hardening           0      mins 28323  Iontophoresis               0   mins 90689  E-Stim                          _0_ mins 86704 ( )    Timed Treatment:   24   mins   Total Treatment:     24   mins    Ye Bonilla PTA  Physical Therapist Assistant

## 2022-12-21 ENCOUNTER — TREATMENT (OUTPATIENT)
Dept: PHYSICAL THERAPY | Facility: CLINIC | Age: 47
End: 2022-12-21

## 2022-12-21 DIAGNOSIS — M54.2 PAIN, NECK: Primary | ICD-10-CM

## 2022-12-21 DIAGNOSIS — M25.511 RIGHT SHOULDER PAIN, UNSPECIFIED CHRONICITY: ICD-10-CM

## 2022-12-21 PROCEDURE — 97110 THERAPEUTIC EXERCISES: CPT | Performed by: PHYSICAL THERAPIST

## 2022-12-21 NOTE — PROGRESS NOTES
Physical Therapy Daily Treatment Note  [address], [address]  [place] [address]      Patient: Sadi Graves   : 1975  Referring practitioner: Gardenia Norris MD  Date of Initial Visit: Type: THERAPY  Noted: 2022  Today's Date: 2022  Patient seen for 5 sessions       Visit Diagnoses:    ICD-10-CM ICD-9-CM   1. Pain, neck  M54.2 723.1   2. Right shoulder pain, unspecified chronicity  M25.511 719.41           Subjective   Patient reports that his neck and shoulder feel better when he does more activity.  Reports that the bicep lock up and is unable to make a full muscle.    Objective   See Exercise, Manual, and Modality Logs for complete treatment.       Assessment/Plan  No significant improvements with PT thus far.  Added  press, in addition to progressing the previous exercises.  Patient tolerated the progression of strengthening exercises without a significant increase in right shoulder or cervical pain.       Timed:         Manual Therapy:    0     mins  77644;     Therapeutic Exercise:    26     mins  44492;     Neuromuscular Melvin:    0    mins  79912;    Therapeutic Activity:     0     mins  51834;     Gait Training      0    mins  75677;  Work Conditioning     0   mins  17383       Untimed:  Electrical Stimulation:    0     mins  15746 ( );      Timed Treatment:   26   mins   Total Treatment:     26   mins    Vahe Nieto, PT  [place] License: [id number]

## 2022-12-27 ENCOUNTER — TELEPHONE (OUTPATIENT)
Dept: PHYSICAL THERAPY | Facility: CLINIC | Age: 47
End: 2022-12-27

## 2023-01-06 ENCOUNTER — OFFICE VISIT (OUTPATIENT)
Dept: CARDIOLOGY | Facility: CLINIC | Age: 48
End: 2023-01-06
Payer: COMMERCIAL

## 2023-01-06 ENCOUNTER — TELEPHONE (OUTPATIENT)
Dept: ORTHOPEDIC SURGERY | Facility: CLINIC | Age: 48
End: 2023-01-06
Payer: COMMERCIAL

## 2023-01-06 VITALS
WEIGHT: 181 LBS | SYSTOLIC BLOOD PRESSURE: 134 MMHG | HEIGHT: 71 IN | HEART RATE: 67 BPM | DIASTOLIC BLOOD PRESSURE: 88 MMHG | BODY MASS INDEX: 25.34 KG/M2

## 2023-01-06 DIAGNOSIS — I10 PRIMARY HYPERTENSION: Chronic | ICD-10-CM

## 2023-01-06 DIAGNOSIS — N18.31 STAGE 3A CHRONIC KIDNEY DISEASE: Chronic | ICD-10-CM

## 2023-01-06 DIAGNOSIS — R55 VASOVAGAL SYNCOPE: ICD-10-CM

## 2023-01-06 DIAGNOSIS — I51.89 DIASTOLIC DYSFUNCTION WITHOUT HEART FAILURE: Primary | ICD-10-CM

## 2023-01-06 PROCEDURE — 93000 ELECTROCARDIOGRAM COMPLETE: CPT | Performed by: INTERNAL MEDICINE

## 2023-01-06 PROCEDURE — 99213 OFFICE O/P EST LOW 20 MIN: CPT | Performed by: INTERNAL MEDICINE

## 2023-01-06 NOTE — TELEPHONE ENCOUNTER
----- Message from Gardenia Norris MD sent at 1/6/2023  2:12 PM EST -----  Regarding: FW: Light duty  Contact: [phone]        ----- Message -----  From: Viola Garcia MA  Sent: 1/6/2023   1:48 PM EST  To: Gardenia Norris MD  Subject: FW: Light duty                                     ----- Message -----  From: Sadi Reyes  Sent: 1/6/2023   1:45 PM EST  To: Keenan Khan [facility] [place]  Subject: Light duty                                       Good afternoon, Happy New Year.  I believe it’s time for me to go to light duty, neck pain is becoming more severe than my shoulder pain.

## 2023-01-06 NOTE — PROGRESS NOTES
Subjective:     Encounter Date:01/06/2023      Patient ID: Sadi Reyes is a 47 y.o. male.    Chief Complaint:  History of Present Illness    This is a 47-year-old with a history of hypertension, chronic kidney disease, hyperlipidemia, chronic diastolic congestive heart failure, who presents for follow-up.     He presents today for routine annual follow-up.  He feels well.  Denies any chest pain, shortness of breath, palpitation, orthopnea, near-syncope or syncope, or lower extremity edema.  He denies any significant changes in his medications.     Prior History:  I saw the patient when he was admitted to the hospital in 10/2020 after presenting with epigastric/chest pain and nausea and vomiting.  He reports that he began having symptoms of diarrhea followed by nausea and vomiting and decreased oral intake.  Following the vomiting he began developing epigastric department pressure that radiated to his chest.  His work-up in the emergency room showed evidence of acute kidney injury.  A CT of the abdomen and pelvis showed evidence of possible pyelonephritis.  His renal function improved with IV fluids.  I felt that his pain was more GI in nature rather than due to cardiac ischemia.  His troponins were negative his EKG was unchanged and an echocardiogram showed normal left ventricular systolic function wall motion with an EF of 60% and grade 3 diastolic dysfunction with no significant valvular disease.  He ended up undergoing an EGD that showed evidence of gastritis and duodenitis.  He was started on pantoprazole and Carafate with improvement in his symptoms.  His blood pressure medications were also adjusted during his stay.     In follow-up in 12/2020 he was doing better with better blood pressure control.  Plan was to consider beta-blocker therapy if he required further blood pressure management.     In 1/2021 he presented for urgent follow-up for syncope.  Since I saw the patient last he reports that he has  had 2 episodes of near syncope and syncope.  First episode occurred the week before Christmas.  He reports that he was getting his haircut and had his head bent down.  He had a sudden onset of nausea and lightheadedness.  He was able to get up out of the chair and lay on the floor.  With that he was able to avoid passing out.  He recovered quickly after laying down and had no further issues that day.  The second episode occurred on New Year's Danelle when he was eating in a restaurant.  He reported that he had a sudden onset of nausea and lightheadedness again.  He went outside to sit on a bench and ended up losing consciousness.  He reported that soon as he regained consciousness he felt fine.  The only other episode he has had a passing out was about 2 to 3 years ago.  The patient reported that he was standing in line to get into a restaurant.  He admitted that he was in line for long period of time.  He reports that he had the same symptoms of nausea and lightheadedness and ended up losing consciousness.  He reported that in between he has had several episodes of similar preceding symptoms but no actual syncope.  I felt that his symptoms were due to vasovagal syncope at that time.  I educated him on vasovagal syncope and maneuvers in order to avoid syncope when he felt symptomatic.     In 5/2021 he went to [facility] emergency room last month after having a near syncopal episode.  He reports that he was at [facility] visiting his daughter who just had a baby.  He was seated when he began feeling flushed, lightheaded, nauseous, and close to passing out.  Fortunately he did not pass out.  He reports that he had eaten and drank that day without any significant issues.  His emergency room work-up was unremarkable and he was discharged from the emergency room.       In follow-up he has been able to be weaned off of the hydralazine and metoprolol succinate and remains on amlodipine only.    Review of Systems    Constitutional: Negative for malaise/fatigue.   HENT: Negative for hearing loss, hoarse voice, nosebleeds and sore throat.    Eyes: Negative for pain.   Cardiovascular: Negative for chest pain, claudication, cyanosis, dyspnea on exertion, irregular heartbeat, leg swelling, near-syncope, orthopnea, palpitations, paroxysmal nocturnal dyspnea and syncope.   Respiratory: Negative for shortness of breath and snoring.    Endocrine: Negative for cold intolerance, heat intolerance, polydipsia, polyphagia and polyuria.   Skin: Negative for itching and rash.   Musculoskeletal: Negative for arthritis, falls, joint pain, joint swelling, muscle cramps, muscle weakness and myalgias.   Gastrointestinal: Negative for constipation, diarrhea, dysphagia, heartburn, hematemesis, hematochezia, melena, nausea and vomiting.   Genitourinary: Negative for frequency, hematuria and hesitancy.   Neurological: Negative for excessive daytime sleepiness, dizziness, headaches, light-headedness, numbness and weakness.   Psychiatric/Behavioral: Negative for depression. The patient is not nervous/anxious.          Current Outpatient Medications:   •  amLODIPine (NORVASC) 10 MG tablet, Take 1 tablet by mouth Daily., Disp: 30 tablet, Rfl: 4  •  atorvastatin (LIPITOR) 20 MG tablet, TAKE 1 TABLET BY MOUTH EVERY DAY, Disp: 90 tablet, Rfl: 0  •  escitalopram (Lexapro) 10 MG tablet, Take 1 tablet by mouth Daily., Disp: 90 tablet, Rfl: 1  •  esomeprazole (nexIUM) 40 MG capsule, Take 1 capsule by mouth 2 (Two) Times a Day Before Meals., Disp: 60 capsule, Rfl: 12    Past Medical History:   Diagnosis Date   • Acute nephritis    • Acute pyelonephritis    • VANESSA (acute kidney injury) (HCC)    • Carpal tunnel syndrome    • Cluster headache    • Epigastric pain     epigastric/chest pain   • Headache    • Headache, tension-type    • Hyperlipidemia    • Hypertension    • Migraine    • Palpitations    • Syncope        Past Surgical History:   Procedure Laterality  Date   • BUNIONECTOMY     • COLONOSCOPY N/A 11/17/2022    Procedure: COLONOSCOPY TO CECUM AND TERMINAL ILEUM;  Surgeon: Tono Romero MD;  Location: [facility] ENDOSCOPY;  Service: Gastroenterology;  Laterality: N/A;  PRE- SCREENING  POST- HEMORRHOIDS   • ENDOSCOPY N/A 11/2/2020    Procedure: ESOPHAGOGASTRODUODENOSCOPY with biopsies;  Surgeon: Tono Romero MD;  Location: [facility] ENDOSCOPY;  Service: Gastroenterology;  Laterality: N/A;  Pre: epigastric pain  Post: duodenitis, gastritis, hiatal hernia   • FOOT SURGERY     • HERNIA REPAIR         Family History   Problem Relation Age of Onset   • Hypertension Mother    • No Known Problems Father    • No Known Problems Sister    • Prostate cancer Maternal Grandfather        Social History     Tobacco Use   • Smoking status: Never   • Smokeless tobacco: Never   • Tobacco comments:     rare caffeine   Vaping Use   • Vaping Use: Never used   Substance Use Topics   • Alcohol use: Yes     Comment: seldom -  1 x weekly   • Drug use: Yes     Frequency: 7.0 times per week     Types: Marijuana     Comment: uses daily         ECG 12 Lead    Date/Time: 1/6/2023 2:25 PM  Performed by: Marta Qureshi MD  Authorized by: Marta Qureshi MD   Comparison: compared with previous ECG   Similar to previous ECG  Rhythm: sinus rhythm               Objective:     Visit Vitals  /88   Pulse 67   Ht 180.3 cm (71\")   Wt 82.1 kg (181 lb)   BMI 25.24 kg/m²         Constitutional:       Appearance: Normal appearance. Well-developed.   HENT:      Head: Normocephalic and atraumatic.   Neck:      Vascular: No carotid bruit or JVD.   Pulmonary:      Effort: Pulmonary effort is normal.      Breath sounds: Normal breath sounds.   Cardiovascular:      Normal rate. Regular rhythm.      No gallop.   Pulses:     Radial: 2+ bilaterally.  Edema:     Peripheral edema absent.   Abdominal:      Palpations: Abdomen is soft.   Skin:     General: Skin is warm and dry.   Neurological:      Mental Status:  Alert and oriented to person, place, and time.             Assessment:          Diagnosis Plan   1. Diastolic dysfunction without heart failure        2. Primary hypertension        3. Vasovagal syncope        4. Stage 3a chronic kidney disease (HCC)               Plan:         1.  Hypertension.  Well-controlled on his current regimen medications.  Continue the same.  2.  History of diastolic congestive heart failure.  No recent evidence of volume overload.  3.  Vasovagal syncope.  No recent episodes.  4.  Chronic kidney disease.    We will plan on seeing the patient back again in 1 year or sooner if further issues arise.

## 2023-01-10 ENCOUNTER — TELEPHONE (OUTPATIENT)
Dept: PHYSICAL THERAPY | Facility: CLINIC | Age: 48
End: 2023-01-10
Payer: COMMERCIAL

## 2023-01-10 NOTE — TELEPHONE ENCOUNTER
L/M ABOUT MISSED APPT ON 1/10/23. ADVISED NO FUTURE APPTS ARE SCHEDULED AND GAVE CALLBACK NUMBER.

## 2023-01-11 ENCOUNTER — OFFICE VISIT (OUTPATIENT)
Dept: ORTHOPEDIC SURGERY | Facility: CLINIC | Age: 48
End: 2023-01-11
Payer: OTHER MISCELLANEOUS

## 2023-01-11 VITALS — BODY MASS INDEX: 24.89 KG/M2 | TEMPERATURE: 96.4 F | HEIGHT: 71 IN | WEIGHT: 177.8 LBS

## 2023-01-11 DIAGNOSIS — M54.16 LUMBAR RADICULOPATHY: Primary | ICD-10-CM

## 2023-01-11 DIAGNOSIS — R52 PAIN: ICD-10-CM

## 2023-01-11 DIAGNOSIS — M54.12 CERVICAL RADICULOPATHY: ICD-10-CM

## 2023-01-11 PROCEDURE — 99214 OFFICE O/P EST MOD 30 MIN: CPT | Performed by: NURSE PRACTITIONER

## 2023-01-11 PROCEDURE — 72100 X-RAY EXAM L-S SPINE 2/3 VWS: CPT | Performed by: NURSE PRACTITIONER

## 2023-01-11 NOTE — LETTER
January 11, 2023     Patient: Sadi Reyes   YOB: 1975   Date of Visit: 1/11/2023       To Whom It May Concern:    It is my medical opinion that Sadi Reyes may return to light duty immediately with the following restrictions: No pushing, pulling, lifting. Sit/stand at will. No overhead activity. Restrictions until seen back in the office which will depend on timing of radiograhic workup. .           Sincerely,        CHARI Smith    CC: No Recipients

## 2023-01-11 NOTE — PROGRESS NOTES
Patient Name: Sadi Reyes   YOB: 1975  Referring Primary Care Physician: Alek Estrada MD      Chief Complaint:    Chief Complaint   Patient presents with   • Lumbar Spine - Initial Evaluation, Pain        HPI:  Sadi Reyes is a 47 y.o. male who [facility].  Adversity.  The evaluation was for cervical radiculopathy, however patient also has secondary complaint of right-sided low back pain referring diffusely down the right lower extremity.  Patient has been evaluated for right shoulder and neck pain by Dr. Norris related to a reported work injury 9/14/2022.  He says he was pushing a refrigerator at work and met resistance and felt a pop in his right shoulder.  He eventually developed pain in bilateral shoulders in reviewing Dr. Muniz most recent evaluation and she felt this was primarily coming from his neck.  He was referred to physical therapy but apparently there was a work comp issue and he only went to 1 session.  He responded well to a steroid Dosepak temporarily.  He also complains of pain radiating down into the right arm terminating at the wrist.  Since his last visit with Dr. Norris on 12/5/2022, he says around [place] time he rolled over in bed and developed sudden right low back pain that eventually radiated down the right lower extremity.  This is an established patient of the practice, new to me.  Prior pertinent records were reviewed.  He apparently has an appointment later today with Kendra norman.  We did discuss this in the office and he states he may cancel.    PFSH:  See attached    ROS: As per HPI, otherwise negative    Objective:    Vitals:    01/11/23 0932   Temp: 96.4 °F (35.8 °C)     Body mass index is 24.8 kg/m².      Physical Exam  Constitutional:       Appearance: He is well-developed and well-nourished.   Eyes:      General: No scleral icterus.  Skin:     General: Skin is intact.    Neurological:      Mental Status: He is alert.   Psychiatric:         Mood and Affect: Mood and affect normal.       Spine Musculoskeletal Exam    Gait    Limp: right    Palpation    Cervical Spine    Tenderness: present      Paraspinous: right      Trapezius: right    Right      Muscle tone: normal    Left      Muscle tone: normal    Thoracolumbar    Tenderness: present      Paraspinous: right      SI Joint: right    Right      Muscle tone: normal    Left      Muscle tone: normal    Range of Motion    Cervical Spine    Cervical flexion detail: crepitus.     Cervical extension detail: crepitus.       Right      Lateral bending detail: crepitus.       Lateral rotation detail: crepitus.       Left      Lateral bending detail: crepitus.       Lateral rotation detail: crepitus.      Strength    Cervical Spine    Cervical spine motor exam is normal.    Thoracolumbar    Thoracolumbar motor exam is normal.     Sensory    Cervical Spine    Cervical spine sensation is normal.    Thoracolumbar    Thoracolumbar sensation is normal.    Reflexes    Right      Fung: absent      Clonus: normal    Left      Fung: absent      Clonus: normal    Spine reflexes additional comments: Reflexes slightly more brisk than expected throughout 2+/4    Special Tests    Thoracolumbar      Right      SLR: pain radiates to right leg      SLR pain at: 50 degrees      Left      SLR: pain radiates to right leg      SLR pain at: 90 degrees    General      Constitutional: well-developed and well-nourished    Scleral icterus: no    Labored breathing: no    Psychiatric: normal mood and affect and no acute distress    Neurological: alert and oriented x3    Skin: intact        IMAGING:     Indication: pain related symptoms,  Views: 2V AP&LAT lumbar  Findings: Personally reviewed with the patient and reveals disc space narrowing and spondylosis at L5-S1 with slight retrolisthesis L4 on L5.  Comparison views: None for direct comparison.  He did have a  lumbar CT at [place] on 1/6/2023 which is not available to review images, but report indicates no acute fracture or malalignment, severe degenerative disc space narrowing L5-S1 with disc bulge and probable right posterior disc herniation causing right subarticular zone encroachment and possible displacement of the right S1 nerve root.  Patient also had cervical CT which was reviewed at [facility] on 1/9/2023 that revealed no fractures or subluxation, right paracentral disc bulge with uncovertebral spurring contributing to mild right foraminal narrowing at C3-C4, central disc protrusion at C5-6 contributing to possible cord flattening although the radiologist reports no canal stenosis, otherwise agree with report.    Assessment:           Diagnoses and all orders for this visit:    1. Lumbar radiculopathy (Primary)  -     IR Myelogram 2 or More Areas; Future  -     CT Lumbar Spine With Intrathecal Contrast; Future    2. Pain  -     XR Spine Lumbar 2 or 3 View    3. Cervical radiculopathy  -     IR Myelogram 2 or More Areas; Future  -     CT Cervical Spine With Intrathecal Contrast; Future    Other orders  -     No Lab Testing Needed; Standing             Plan:  His symptoms are fairly diffuse and difficult for him to pinpoint but he does describe some radicular pain in the right arm and in the right leg.  He does have quicker than average reflexes, would not quite characterize these as hyperreflexia and he does not have any other myelopathic findings on exam, but at least raises concern.  He cannot have MRI due to some shrapnel in his head so will obtain cervical and lumbar CT myelograms to rule out cord compression and confirms suspicion of disc herniation at L5-S1 as well as the pathology seen on CT of the cervical spine at C5-6.  Depending on those results, we will either initiate formal physical therapy for both the neck and the low back, refer him to neurosurgery or if there is nothing of surgical concern, we will  "likely refer him to physical medicine and rehab as he has several work concerns that are primarily related to work function.  I do not do functional capacity exams, therefore may refer him out to a rehab specialist for possible functional capacity exam.  We will keep him on light duty initiated by Dr. Norris with no pushing, pulling or lifting, sit/stand at will until seen back in the office with the myelogram results.  We will also approve intermittent FMLA as he states he has missed several days due to his symptoms, but that is not something that we will manage long-term, just for this 12-month period.  He does ask for pain medication and was advised that we do not manage narcotics nor recommend them.  Depending on myelogram results and treatment plan, could also consider referral to pain management if indicated.  Greater than 30 minutes spent reviewing records, exam, reviewing radiographics and discussing treatment plan.  Return for After radiographic tests.     Addendum:  Patient called the office on 1/18/2023 wanting to clarify how and when his back pain started.  To minimize any misunderstanding, his message is copied and pasted below.    \"I believe it was a misunderstanding about my back pain.  Around [place] time I rolled over to get out of bed and had back pains that caused me to not be able to get up out of bed right away.  My back pains just didn’t happen to start that day, I have been working every since I reported my injury in October and my pain went down from my shoulder to my back, it just gotten worse to the point I couldn’t move correctly anymore and that was around [place] time when this pain worsen to the point I couldn’t take it.  My job just called and from the notes they saying it looks like I hurt my back when I was trying to get out of bed.\"      I am not asked to establish causation nor will I argue semantics.  If he states his pain started with the prior work injury, then I will take " him at his word and otherwise he and his  will figure out how to proceed.

## 2023-01-18 ENCOUNTER — TELEPHONE (OUTPATIENT)
Dept: ORTHOPEDIC SURGERY | Facility: CLINIC | Age: 48
End: 2023-01-18
Payer: COMMERCIAL

## 2023-01-18 NOTE — TELEPHONE ENCOUNTER
----- Message from Neetu Childs MA sent at 1/18/2023  4:20 PM EST -----  Regarding: Workman’s comp misunderstanding   Contact: [phone]  Please review and advise       ----- Message -----  From: Sadi Reyes  Sent: 1/18/2023   4:16 PM EST  To: [facility]  Subject: Workman’s comp misunderstanding                     I believe it was a misunderstanding about my back pain.  Around [place] time I rolled over to get out of bed and had back pains that caused me to not be able to get up out of bed right away.  My back pains just didn’t happen to start that day, I have been working every since I reported my injury in October and my pain went down from my shoulder to my back, it just gotten worse to the point I couldn’t move correctly anymore and that was around [place] time when this pain worsen to the point I couldn’t take it.  My job just called and from the notes they saying it looks like I hurt my back when I was trying to get out of bed.

## 2023-01-19 NOTE — TELEPHONE ENCOUNTER
Please let me know once the note is done. I have called the pt and made him aware you will amend the note.

## 2023-01-24 NOTE — TELEPHONE ENCOUNTER
Pt is aware of office note being completed. Pt asked about his CT being scheduled. I gave patient [facility] scheduling number [phone] and advised pt to give them a call. Pt verbally understanding.

## 2023-01-25 ENCOUNTER — TELEPHONE (OUTPATIENT)
Dept: ORTHOPEDIC SURGERY | Facility: CLINIC | Age: 48
End: 2023-01-25

## 2023-01-25 NOTE — TELEPHONE ENCOUNTER
Caller: JOANN    Relationship to patient: [facility]PT    Best call back number: [phone]    Patient is needing: JOANN WITH [facility]PT WAS CALLING TO TALK TO SOMEONE IN THE OFFICE REGARDING THE IMAGING ORDERED FOR PATIENT. JOANN STATED THEIR WAS A LOT OF IMAGING ORDERED FOR THE CERVICAL AND LUMBAR SPINE BUT SHE  NEEDED TO KNOW IF THIS WOULD BE WORKERS COMP OR NOT. I ATTEMPTED TO WARM TRANSFER CALL TO THE OFFICE BUT RECEIVED NO ANSWER. JOANN IS REQUESTING A CALL BACK TO DISCUSS THIS. THANK YOU!

## 2023-01-26 NOTE — TELEPHONE ENCOUNTER
Caller: JOANN    Relationship: [facility]    Best call back number:    What was the call regarding: WHETHER OR NOT THE PATIENT'S IMAGING IS WORK COMP RELATED    Do you require a callback: YES

## 2023-01-27 ENCOUNTER — TELEPHONE (OUTPATIENT)
Dept: ORTHOPEDIC SURGERY | Facility: CLINIC | Age: 48
End: 2023-01-27

## 2023-01-27 NOTE — TELEPHONE ENCOUNTER
Caller: MELANIE     Relationship: SCHEDULE    Best call back number: [phone]    What form or medical record are you requesting: CT CERVICAL SPINE WITH CONTRAST    Who is requesting this form or medical record from you: [facility]    How would you like to receive the form or medical records (pick-up, mail, fax): IN EPIC      Timeframe paperwork needed: ASAP     Additional notes: WORKER'S COMP COMPANY DENIED CT CERVICAL SPINE

## 2023-01-27 NOTE — TELEPHONE ENCOUNTER
Yes, just wanted to let you know and unsure of next steps. It is W/C, so I will also send to Daniel.

## 2023-01-30 ENCOUNTER — TELEPHONE (OUTPATIENT)
Dept: ORTHOPEDIC SURGERY | Facility: CLINIC | Age: 48
End: 2023-01-30
Payer: COMMERCIAL

## 2023-01-30 DIAGNOSIS — M54.12 CERVICAL RADICULOPATHY: Primary | ICD-10-CM

## 2023-01-30 NOTE — TELEPHONE ENCOUNTER
Caller: ASHELY CHATMAN    Relationship: [facility]    Best call back number: [phone]    What orders are you requesting (i.e. lab or imaging): NEW AND UPDATED ORDER FOR MYELOGRAM OF THE CERVICAL SPINE.     In what timeframe would the patient need to come in: ASAP    Where will you receive your lab/imaging services: [facility]     Additional notes: ASHELY WITH [facility] RADIOLOGY DEPT WAS CALLING TO REQUEST A NEW AND UPDATED ORDER FOR MYELOGRAM OF THE CERVICAL SPINE. ASHELY STATED PATIENT'S INSURANCE COMPANY DENIED THE LUMBAR SPINE SO THEY NEED A NEW ORDER FOR THE CERVICAL SPINE ONLY. IF YOU HAVE ANY QUESTIONS YOU CAN REACH ASHELY AT THE NUMBER ABOVE. THANK YOU!

## 2023-01-30 NOTE — TELEPHONE ENCOUNTER
Andie can you look into this message.  It was sent to me instead of you for WC. Please follow up with Deborah.

## 2023-01-31 ENCOUNTER — DOCUMENTATION (OUTPATIENT)
Dept: PHYSICAL THERAPY | Facility: CLINIC | Age: 48
End: 2023-01-31
Payer: COMMERCIAL

## 2023-02-01 ENCOUNTER — HOSPITAL ENCOUNTER (OUTPATIENT)
Dept: GENERAL RADIOLOGY | Facility: HOSPITAL | Age: 48
Discharge: HOME | End: 2023-02-01
Payer: OTHER MISCELLANEOUS

## 2023-02-01 ENCOUNTER — HOSPITAL ENCOUNTER (OUTPATIENT)
Dept: CT IMAGING | Facility: HOSPITAL | Age: 48
Discharge: HOME | End: 2023-02-01
Payer: OTHER MISCELLANEOUS

## 2023-02-01 ENCOUNTER — TELEPHONE (OUTPATIENT)
Dept: ORTHOPEDIC SURGERY | Facility: CLINIC | Age: 48
End: 2023-02-01

## 2023-02-01 ENCOUNTER — HOSPITAL ENCOUNTER (OUTPATIENT)
Dept: CT IMAGING | Facility: HOSPITAL | Age: 48
End: 2023-02-01
Payer: OTHER MISCELLANEOUS

## 2023-02-01 ENCOUNTER — TELEPHONE (OUTPATIENT)
Dept: ORTHOPEDIC SURGERY | Facility: CLINIC | Age: 48
End: 2023-02-01
Payer: COMMERCIAL

## 2023-02-01 VITALS
TEMPERATURE: 98.4 F | HEIGHT: 71 IN | WEIGHT: 180 LBS | OXYGEN SATURATION: 98 % | BODY MASS INDEX: 25.2 KG/M2 | DIASTOLIC BLOOD PRESSURE: 91 MMHG | RESPIRATION RATE: 18 BRPM | SYSTOLIC BLOOD PRESSURE: 140 MMHG | HEART RATE: 67 BPM

## 2023-02-01 DIAGNOSIS — M54.12 CERVICAL RADICULOPATHY: ICD-10-CM

## 2023-02-01 PROCEDURE — 72126 CT NECK SPINE W/DYE: CPT

## 2023-02-01 PROCEDURE — 0 LIDOCAINE 1 % SOLUTION: Performed by: NURSE PRACTITIONER

## 2023-02-01 PROCEDURE — 25010000002 IOPAMIDOL 61 % SOLUTION: Performed by: NURSE PRACTITIONER

## 2023-02-01 PROCEDURE — 62302 MYELOGRAPHY LUMBAR INJECTION: CPT

## 2023-02-01 PROCEDURE — 72240 MYELOGRAPHY NECK SPINE: CPT

## 2023-02-01 RX ORDER — SODIUM CHLORIDE 0.9 % (FLUSH) 0.9 %
10 SYRINGE (ML) INJECTION AS NEEDED
Status: DISCONTINUED | OUTPATIENT
Start: 2023-02-01 | End: 2023-02-02 | Stop reason: HOSPADM

## 2023-02-01 RX ORDER — LIDOCAINE HYDROCHLORIDE 10 MG/ML
10 INJECTION, SOLUTION INFILTRATION; PERINEURAL ONCE
Status: COMPLETED | OUTPATIENT
Start: 2023-02-01 | End: 2023-02-01

## 2023-02-01 RX ORDER — ALPRAZOLAM 0.5 MG/1
0.5 TABLET ORAL ONCE
Status: COMPLETED | OUTPATIENT
Start: 2023-02-01 | End: 2023-02-01

## 2023-02-01 RX ORDER — SODIUM CHLORIDE 0.9 % (FLUSH) 0.9 %
3 SYRINGE (ML) INJECTION EVERY 12 HOURS SCHEDULED
Status: DISCONTINUED | OUTPATIENT
Start: 2023-02-01 | End: 2023-02-02 | Stop reason: HOSPADM

## 2023-02-01 RX ADMIN — IOPAMIDOL 10 ML: 612 INJECTION, SOLUTION INTRATHECAL at 14:07

## 2023-02-01 RX ADMIN — ALPRAZOLAM 0.5 MG: 0.5 TABLET ORAL at 12:54

## 2023-02-01 RX ADMIN — LIDOCAINE HYDROCHLORIDE 2 ML: 10 INJECTION, SOLUTION INFILTRATION; PERINEURAL at 14:04

## 2023-02-01 NOTE — DISCHARGE INSTRUCTIONS
EDUCATION /DISCHARGE INSTRUCTIONS:  A myelogram is a special radiology procedure of the spinal cord, spinal nerves and other related structures.  You will be awake during the examination.  An area of your lower back will be cleansed with an antiseptic solution.  The physician will inject a numbing medication in your lower back.  While your back is numb, a needle will be placed in the lower back area.  A small amount of spinal fluid may be withdrawn and sent to the lab if ordered by your physician. While the needle is in the back, an injection of a contrast material (xray dye) will be given through the needle.  The contrast material will allow the physician to see the spinal cord and spinal nerves.  Once injected, the needle will be removed and a band aid will be placed over the injection site.  The table will be tilted during the process to allow the contrast material to flow to particular areas in the spine.  Following the injection and xrays, you will be taken to the CT scan where more pictures will be taken. After the procedure is finished, the contrast material will be absorbed by your body and eliminated through your kidneys.  The radiologist will study and interpret your myelogram and send the results to your physician.  Procedure risks of a myelogram include, but are not limited to:  *  Bleeding   *  Seizure  *  Infection   *  Headache, possibly severe requiring a blood patch  *  Contrast reaction  *  Nerve or cord injury  *  Paralysis and death    Benefits of the procedure:    Best examination for delineating pathology related to spinal cord compression from a disc and/or nerve root compression  Alternatives to the procedure:MRI - a non invasive procedure requiring intravenous contrast injection. Cannot be done on patients with certain pacemakers or metal in the body.  MRI risks include possible reaction to the contrast material, movement of metal located in the body.   Benefit to MRI:  Non-invasive and  usually painless procedure.  THIS EDUCATION INFORMATION WAS REVIEWED PRIOR TO THE PROCEDURE AND CONSENT. Patient initials __________________Time____1233_____________      Important information following your myelogram:    *  When you get home, lie down with no more than 2 pillows under your head.  *  Sit up in the car going home. At home, sit up to eat and use the restroom only, then lie back down.   *  24 HOURS COMPLETE AT ________________________   *  Tomorrow, after 24 hours completed, take it easy and rest the next 2-3 days.  *  Do not drive for 24 hours following a myelogram  *  You may remove the bandage and shower in the morning  *  Increase your fluids for the next 24 hours.  Caffeinated drinks are encouraged.Increase your intake of fluids the next 2-3 days      CALL YOUR PHYSICIAN FOR THE FOLLOWING:  * Pain at the injection site  * Redness, swelling, bruising or drainage at the injection site  * A fever by mouth of 101.0  * Any new symptoms  If you have problems with a headache that is not relieved with rest and medication, please call the Radiology Triage Nurse desk [phone]

## 2023-02-01 NOTE — TELEPHONE ENCOUNTER
Caller: MAKENZIE NUÑEZ     Relationship to patient: PATIENT     Best call back number: [phone]    PATIENT HAS NOT BEEN CONTACT FOR CT LUMBAR .  PLEASE CALL PATIENT TO DISCUSS

## 2023-02-01 NOTE — TELEPHONE ENCOUNTER
Called radiology to inform them that patient had requested myelogram of the lumbar spine to be done through his regular insurance since work comp denied lumbar but approved cervical.  We did not give him the option of canceling myelogram today while we await insurance approval of the lumbar.  Had also sent a message to staff to clarify if the myelogram could be split between insurances so that patient did not then have to go back for a second myelogram.

## 2023-02-02 ENCOUNTER — TELEPHONE (OUTPATIENT)
Dept: INTERVENTIONAL RADIOLOGY/VASCULAR | Facility: HOSPITAL | Age: 48
End: 2023-02-02
Payer: COMMERCIAL

## 2023-02-04 ENCOUNTER — ANESTHESIA (OUTPATIENT)
Dept: PREOP | Facility: HOSPITAL | Age: 48
End: 2023-02-04
Payer: OTHER MISCELLANEOUS

## 2023-02-04 ENCOUNTER — HOSPITAL ENCOUNTER (OUTPATIENT)
Dept: PREOP | Facility: HOSPITAL | Age: 48
Discharge: HOME | End: 2023-02-04
Payer: OTHER MISCELLANEOUS

## 2023-02-04 ENCOUNTER — TELEPHONE (OUTPATIENT)
Dept: INTERVENTIONAL RADIOLOGY/VASCULAR | Facility: HOSPITAL | Age: 48
End: 2023-02-04
Payer: COMMERCIAL

## 2023-02-04 ENCOUNTER — ANESTHESIA EVENT (OUTPATIENT)
Dept: PREOP | Facility: HOSPITAL | Age: 48
End: 2023-02-04
Payer: OTHER MISCELLANEOUS

## 2023-02-04 VITALS
DIASTOLIC BLOOD PRESSURE: 81 MMHG | HEART RATE: 64 BPM | OXYGEN SATURATION: 99 % | SYSTOLIC BLOOD PRESSURE: 132 MMHG | RESPIRATION RATE: 20 BRPM

## 2023-02-04 RX ORDER — SODIUM CHLORIDE, SODIUM LACTATE, POTASSIUM CHLORIDE, AND CALCIUM CHLORIDE .6; .31; .03; .02 G/100ML; G/100ML; G/100ML; G/100ML
500 INJECTION, SOLUTION INTRAVENOUS ONCE
Status: DISCONTINUED | OUTPATIENT
Start: 2023-02-04 | End: 2023-02-04

## 2023-02-04 NOTE — ANESTHESIA PROCEDURE NOTES
Blood Patch      Patient reassessed immediately prior to procedure    Patient location during procedure: holding area  Blood patch placed: 2/4/2023 2:35 PM  Stop Time:2/4/2023 2:52 PM    Indications for Blood Patch: dural puncture and headache  Preanesthetic Checklist  Completed: patient identified, IV checked, site marked, risks and benefits discussed, surgical consent, monitors and equipment checked, pre-op evaluation and timeout performed  Blood Patch Prep  Patient position: sitting  Sterile Tech: sterile barrier, cap and gloves.  Prep: ChloraPrep  Patient monitoring: EKG, continuous pulse ox and blood pressure monitoring  Location: L3-L4  Blood patch source: left antecubital IV.  Blood Patch Procedure  Sedation:no    Approach: midline   Guidance: palpation technique  Needle Gauge 17 G  Needle Type: Tuohy  Solution used: autologous blood  Loss of Resistance: 5 cm  Loss of Resistance Medium: saline  Blood Patch Source:venous    Amount injected: 20 mL  Assessment  Patient tolerance:patient tolerated the procedure well with no apparent complications  Complications:no  Additional Notes  20 ml injected slowly into epidural space until patient had mild lower extremity pressure. Patient tolerated procedure well. Instructions given for patient to remain flat for a minimum of six hours

## 2023-02-04 NOTE — ANESTHESIA POSTPROCEDURE EVALUATION
Patient: Sadi Graves    Procedure Summary     Date: 02/04/23 Room / Location: [facility] PRE OP PHASE II    Anesthesia Start:  Anesthesia Stop:     Procedure: BLOOD PATCH Diagnosis:     Scheduled Providers:  Provider:     Anesthesia Type: epidural ASA Status: 2          Anesthesia Type: epidural    Vitals  Vitals Value Taken Time   /84 02/04/23 1447   Temp     Pulse 61 02/04/23 1447   Resp 18 02/04/23 1447   SpO2 99 % 02/04/23 1447           Post Anesthesia Care and Evaluation    Patient location during evaluation: bedside  Patient participation: complete - patient participated  Level of consciousness: awake and alert  Pain management: adequate    Airway patency: patent  Anesthetic complications: No anesthetic complications  PONV Status: controlled  Cardiovascular status: acceptable  Respiratory status: acceptable  Hydration status: acceptable    Comments: Patient endorses headache improvement. Instructions given re: supine positioning for as long as possible, caffeine, hydration, and to return if HA worsens.

## 2023-02-04 NOTE — ANESTHESIA PREPROCEDURE EVALUATION
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 I have explained the risks of epidural anesthesia including but not limited to hypotension, PDPH, nerve injury, and risk of failure/replacement. Patient understands risks and agrees to proceed.    Epidural blood patch. I explained to the patient the risk of failure, need for another blood patch, worsening the PDPH, infection, bleeding. Patient agrees to proceed. )    Anesthetic plan, risks, benefits, and alternatives have been provided, discussed and informed consent has been obtained with: patient.        CODE STATUS:

## 2023-02-04 NOTE — ADDENDUM NOTE
Addendum  created 02/04/23 1610 by Yudith Fonseca MD    Order Reconciliation Section accessed, Order list changed, Problem List reviewed

## 2023-02-04 NOTE — ADDENDUM NOTE
Addendum  created 02/04/23 1606 by Yudith Fonseca MD    Medication List reviewed, Order Reconciliation Section accessed, Order list changed, Pharmacy for encounter modified

## 2023-02-06 ENCOUNTER — OFFICE VISIT (OUTPATIENT)
Dept: ORTHOPEDIC SURGERY | Facility: CLINIC | Age: 48
End: 2023-02-06
Payer: OTHER MISCELLANEOUS

## 2023-02-06 ENCOUNTER — TELEPHONE (OUTPATIENT)
Dept: ORTHOPEDIC SURGERY | Facility: CLINIC | Age: 48
End: 2023-02-06

## 2023-02-06 VITALS — HEIGHT: 71 IN | WEIGHT: 180.6 LBS | BODY MASS INDEX: 25.28 KG/M2 | TEMPERATURE: 98.6 F

## 2023-02-06 DIAGNOSIS — M25.511 ACUTE PAIN OF RIGHT SHOULDER: ICD-10-CM

## 2023-02-06 DIAGNOSIS — M54.16 LUMBAR RADICULOPATHY: Primary | ICD-10-CM

## 2023-02-06 DIAGNOSIS — M54.2 NECK PAIN: ICD-10-CM

## 2023-02-06 PROCEDURE — 99213 OFFICE O/P EST LOW 20 MIN: CPT | Performed by: NURSE PRACTITIONER

## 2023-02-06 NOTE — PROGRESS NOTES
Patient Name: Sadi Reyes   YOB: 1975  Referring Primary Care Physician: Alek Estrada MD      Chief Complaint:    Chief Complaint   Patient presents with   • Cervical Spine - Follow-up, Pain        HPI:  Sadi Reyes is a 47 y.o. male who presents to [facility] for follow-up after cervical myelogram.  Patient has cervical myelogram last week, we did call radiology on that morning to give him the option of canceling cervical myelogram since the lumbar myelogram had not yet yet been approved through his primary insurance.  Work comp denied treatment of the lumbar spine.  Today his primary complaint is low back pain referring down the right lower extremity with numbness and tingling in the calf.    PFSH:  See attached    ROS: As per HPI, otherwise negative    Objective:    Vitals:    02/06/23 1034   Temp: 98.6 °F (37 °C)     Body mass index is 25.19 kg/m².      Physical Exam  Constitutional:       Appearance: He is well-developed and well-nourished.   Eyes:      General: No scleral icterus.  Skin:     General: Skin is intact.   Neurological:      Mental Status: He is alert.   Psychiatric:         Mood and Affect: Mood and affect normal.       Spine Musculoskeletal Exam    Gait    Antalgic: right    Palpation    Thoracolumbar    Right      Muscle tone: normal    Left      Muscle tone: normal    Strength    Thoracolumbar    Thoracolumbar motor exam is normal.     Sensory    Thoracolumbar      Right      Medial leg: decreased      Lateral leg: decreased    Reflexes    Right      Quadriceps: hyporeflexic      Achilles: hyporeflexic     Left      Quadriceps: hyporeflexic      Achilles: hyporeflexic    Special Tests    Thoracolumbar      Right      SLR: no back or leg pain      Left      SLR: no back or leg pain    General      Constitutional: well-developed and well-nourished    Scleral icterus: no    Labored breathing: no    Psychiatric: normal mood and affect and no  acute distress    Neurological: alert and oriented x3    Skin: intact        IMAGING:       No imaging office today.  Cervical milligram was reviewed and reveals mild disc bulge at C5-6 without any canal or foraminal narrowing at this or any other level.    Lumbar CT at [place] was not available for direct review, but report suggests severe degenerative space narrowing at L5-S1 with disc bulge and probable right posterior disc herniation causing right subarticular zone encroachment and possible to placement of the traversing right S1 nerve root.  He cannot have MRI due to reported shrapnel.  Assessment:           Diagnoses and all orders for this visit:    1. Lumbar radiculopathy (Primary)  -     Ambulatory Referral to Physical Therapy Evaluate and treat  -     Ambulatory Referral to Pain Management    2. Neck pain  -     Ambulatory Referral to Physical Therapy Evaluate and treat    3. Acute pain of right shoulder  -     Ambulatory Referral to Physical Therapy Evaluate and treat             Plan:  He was referred to physical therapy for the neck and shoulder by Dr. Norris, but for some reason it was not completely approved and he only went for 1 evaluation.  We will try therapy again for the neck, shoulder and low back.  In regards to ongoing shoulder pain, he was advised to follow-up with Dr. Norris since the cervical myelogram does not explain the shoulder pain.  We will also refer him to pain management to try epidural injections for the lumbar complaints.  He did have lumbar CT scan at [place] which revealed degenerative change at L5-S1 with some right sided neuroforaminal narrowing.  We will keep him off of work for 8 weeks while he focuses on treatment.  We will see him back in 3 months to see how he is progressing, he will call after the 8 weeks if he does not feel ready to return to work.  He was advised to work with his  regarding the work comp claim as that is out of my control.  Depending on results  from therapy and injections, would consider a lumbar CT myelogram, but do not want to do that in the short-term since he had a cervical myelogram especially since he required a blood patch.  He cannot have MRI due to reported shrapnel.    Return in about 3 months (around 5/6/2023).

## 2023-02-06 NOTE — TELEPHONE ENCOUNTER
Caller: MAKENZIE GRAVES    Relationship: SELF    Best call back number: [phone]    What form or medical record are you requesting: OFF WORK LETTER FOR 8 WEEKS    Who is requesting this form or medical record from you: PATIENT    How would you like to receive the form or medical records (pick-up, mail, fax): PICKUP      Timeframe paperwork needed: ASAP    Additional notes: PLEASE CALL PATIENT WHEN IT'S AVAILABLE.

## 2023-02-07 ENCOUNTER — HOSPITAL ENCOUNTER (OUTPATIENT)
Dept: PHYSICAL THERAPY | Facility: HOSPITAL | Age: 48
Setting detail: THERAPIES SERIES
Discharge: HOME | End: 2023-02-07
Payer: OTHER MISCELLANEOUS

## 2023-02-07 DIAGNOSIS — M54.41 RIGHT-SIDED LOW BACK PAIN WITH RIGHT-SIDED SCIATICA, UNSPECIFIED CHRONICITY: ICD-10-CM

## 2023-02-07 DIAGNOSIS — M25.511 RIGHT SHOULDER PAIN, UNSPECIFIED CHRONICITY: ICD-10-CM

## 2023-02-07 DIAGNOSIS — M54.2 NECK PAIN: ICD-10-CM

## 2023-02-07 PROCEDURE — 97161 PT EVAL LOW COMPLEX 20 MIN: CPT | Performed by: PHYSICAL THERAPIST

## 2023-02-07 PROCEDURE — 97110 THERAPEUTIC EXERCISES: CPT | Performed by: PHYSICAL THERAPIST

## 2023-02-07 NOTE — THERAPY EVALUATION
Outpatient Physical Therapy Ortho Initial Evaluation  [facility]     Patient Name: Sadi Reyes  : 1975  MRN: [id number]  Today's Date: 2023      Visit Date: 2023    Patient Active Problem List   Diagnosis   • Acute pyelonephritis   • VANESSA (acute kidney injury) (HCC)   • Leucocytosis   • Dehydration   • Nausea vomiting and diarrhea   • Acute abdominal pain   • Stage 3a chronic kidney disease (HCC)   • Hypertension   • Diastolic dysfunction without heart failure   • Acute gastritis without bleeding   • Acute duodenitis   • Non-cardiac chest pain   • Cervicogenic headache   • Vasovagal syncope   • Encounter for screening for malignant neoplasm of colon        Past Medical History:   Diagnosis Date   • Acute nephritis    • Acute pyelonephritis    • VANESSA (acute kidney injury) (HCC)    • Carpal tunnel syndrome    • Cluster headache    • Epigastric pain     epigastric/chest pain   • Headache    • Headache, tension-type    • Hyperlipidemia    • Hypertension    • Migraine    • Palpitations    • Syncope         Past Surgical History:   Procedure Laterality Date   • BUNIONECTOMY     • COLONOSCOPY N/A 2022    Procedure: COLONOSCOPY TO CECUM AND TERMINAL ILEUM;  Surgeon: Tono Romero MD;  Location: [facility] ENDOSCOPY;  Service: Gastroenterology;  Laterality: N/A;  PRE- SCREENING  POST- HEMORRHOIDS   • ENDOSCOPY N/A 2020    Procedure: ESOPHAGOGASTRODUODENOSCOPY with biopsies;  Surgeon: Tono Romero MD;  Location: [facility] ENDOSCOPY;  Service: Gastroenterology;  Laterality: N/A;  Pre: epigastric pain  Post: duodenitis, gastritis, hiatal hernia   • FOOT SURGERY     • HERNIA REPAIR         Visit Dx:     ICD-10-CM ICD-9-CM   1. Right-sided low back pain with right-sided sciatica, unspecified chronicity  M54.41 724.3   2. Right shoulder pain, unspecified chronicity  M25.511 719.41   3. Neck pain  M54.2 723.1          Patient History     Row Name 23 0830             History    Chief  "Complaint Pain  -JS      Type of Pain Back pain;Neck pain;Shoulder pain  -JS      Date Current Problem(s) Began 09/14/22  -JS      Brief Description of Current Complaint Pt reports pushing refridgerator down ramp at work on 9/15/22 when it became stuck & pushed back against him resulting in R shoulder \"pop\" and pain, LBP, neck pain. At this time, c/o LBP, neck & R shoulder pain, though LBP is the worst. Pt had received PT for R shoulder & neck at [facility] through Jan 2023, but notes that back & R LE pain became worse and started to interfere with attendance to therapy.  Has stopped doing all HEP that was previously issued for neck/shoulder.  Lives with wife/son in home, basement bedroom.  -JS      Patient/Caregiver Goals Relieve pain  -JS      Hand Dominance right-handed  -JS      Occupation/sports/leisure activities Off work from [employer] since 12/22/22. Currently on Worker's Comp.  -JS      How has patient tried to help current problem? Previous rehab for neck/shoulder, has now been referred to pain management.  -JS      What clinical tests have you had for this problem? Myelogram  -JS         Pain     Pain Location Neck;Back;Shoulder  -JS      Pain at Present 5  -JS      Pain at Best 4  -JS      Pain at Worst 10  -JS      What Performance Factors Make the Current Problem(s) WORSE? Standing/Sitting >10 min, Walking, Bending, Lifting  -JS      What Performance Factors Make the Current Problem(s) BETTER? Lie flat (back), avoid reaching  -JS      Is your sleep disturbed? Yes  -JS      Total hours of sleep per night 2-3 hours at a time  -JS      What position do you sleep in? Supine  -JS      Difficulties at work? Off work since 12/22/22  -JS      Difficulties with ADL's? Dressing, bending to tie shoes, lifting, walking. stairs (13 steps to basement bedroom)  -JS         Fall Risk Assessment    Any falls in the past year: No  -JS         Daily Activities    Primary Language English  -JS      Are you able " to read Yes  -JS      Are you able to write Yes  -JS      Pt Participated in POC and Goals Yes  -JS         Safety    Are you being hurt, hit, or frightened by anyone at home or in your life? No  -JS      Are you being neglected by a caregiver No  -JS            User Key  (r) = Recorded By, (t) = Taken By, (c) = Cosigned By    Initials Name Provider Type    Clemencia Newman PT Physical Therapist                 PT Ortho     Row Name 02/07/23 8450       Subjective Comments    Subjective Comments Back pain is greatest area of pain. Pain extends down R LE. Also c/o neck pain & R shoulder pain. Received PT in the past for neck/shoulder- finished in Dec 2022.  -JS       Subjective Pain    Able to rate subjective pain? yes  -JS    Pre-Treatment Pain Level 5  -JS       Posture/Observations    Alignment Options Forward head;Rounded shoulders  -JS    Forward Head Mild  -JS    Rounded Shoulders Bilateral:;Mild  -JS       Quarter Clearing    Quarter Clearing Lower Quarter Clearing  -JS       Neural Tension Signs- Lower Quarter Clearing    Slump Right:;Positive;Left:;Negative  -JS    Well Slump Positive;Left:  -JS    SLR Positive;Right:  -JS       Sensory Screen for Light Touch- Lower Quarter Clearing    L1 (inguinal area) Bilateral:;Intact  -JS    L2 (anterior mid thigh) Bilateral:;Intact  -JS    L3 (distal anterior thigh) Bilateral:;Intact  -JS    L4 (medial lower leg/foot) Right:;Diminished  -JS    L5 (lateral lower leg/great toe) Right:;Diminished  -JS    S1 (bottom of foot) Right:;Diminished  -JS       Myotomal Screen- Lower Quarter Clearing    Hip flexion (L2) Right:;3+ (Fair +);Left:;4- (Good -)  fair core stabilization during resisted movement  -JS    Knee extension (L3) Right:;4 (Good);Left:;5 (Normal)  -JS    Ankle DF (L4) Right:;4 (Good);Left:;5 (Normal)  -JS    Ankle PF (S1) Bilateral:;5 (Normal)  -JS    Knee flexion (S2) Right:;4 (Good);Left:;4+ (Good +)  -JS       Lumbar ROM Screen- Lower Quarter Clearing    Lumbar  Flexion Impaired  Completes 25% with R sided LBP  -JS    Lumbar Extension Impaired  Completes 50% with R sided LBP  -JS    Lumbar Lateral Flexion Impaired  Completes 50% with R sided LBP  -JS    Lumbar Rotation Impaired  Completes 25% with R sided LBP  -JS       Special Tests/Palpation    Special Tests/Palpation Lumbar/SI  -JS       General ROM    RT Lower Ext Rt Hip Flexion;Rt Hip Internal Rotation;Rt Hip External Rotation  -JS    LT Lower Ext Lt Hip Flexion;Lt Hip External Rotation;Lt Hip Internal Rotation  -JS       Right Lower Ext    Rt Hip Flexion PROM Limited to 90 degrees- pt resists further motion due to c/o R hip pain  -JS       Left Lower Ext    Lt Hip Flexion PROM Limited to 90 degrees- pt resists further motion due to c/o R hip pain  -JS       MMT (Manual Muscle Testing)    Rt Lower Ext Rt Hip ABduction;Rt Hip Extension  -JS    Lt Lower Ext Lt Hip ABduction;Lt Hip Extension  -JS       MMT Right Lower Ext    Rt Hip Extension MMT, Gross Movement (2/5) poor  unable to raise antigravity in prone due to pain  -JS    Rt Hip ABduction MMT, Gross Movement (3-/5) fair minus  -JS       MMT Left Lower Ext    Lt Hip Extension MMT, Gross Movement (2/5) poor  unable to raise antigravity in prone due to pain  -JS    Lt Hip ABduction MMT, Gross Movement (3/5) fair  -JS       Flexibility    Flexibility Tested? Lower Extremity  -JS       Lower Extremity Flexibility    Hamstrings Bilateral:;Moderately limited  -JS    Quadriceps Bilateral:;Moderately limited  -JS    Hip External Rotators Bilateral:;Mildly limited  -JS    Hip Internal Rotators Bilateral:;Mildly limited  -JS       Gait/Stairs (Locomotion)    Comment, (Gait/Stairs) Amb with upright posture, decreased weight shift to R due to R LE pain, resulting in antalgic gait pattern  -JS          User Key  (r) = Recorded By, (t) = Taken By, (c) = Cosigned By    Initials Name Provider Type    Clemencia Newman PT Physical Therapist                            Therapy  Education  Education Details: Instruction in initial HEP with written instruction issued via [employer] [id number].      PT OP Goals     Row Name 02/07/23 0830          PT Short Term Goals    STG Date to Achieve 03/09/23  -JS     STG 1 Pt will be independent and compliant in initial HEP.  -JS     STG 1 Progress New  -JS     STG 2 Pt will demonstrate sit<->stand with proper core stabilization, minimal UE use.  -JS     STG 2 Progress New  -JS     STG 3 Pt will demonstrate proper postural awareness with sitting, standing activities.  -JS     STG 3 Progress New  -JS        Long Term Goals    LTG Date to Achieve 05/08/23  -JS     LTG 1 Pt will be independent in comprehensive HEP to allow ongoing self-management of condition.  -JS     LTG 1 Progress New  -JS     LTG 2 Pt will normalize ambulation, tolerating walking >/=20 min without increased symptoms for improved tolerance to functional and work related activities.  -JS     LTG 2 Progress New  -JS     LTG 3 Pt will improve lumbar spine AROM to 75% or greater for improved functional mobility.  -JS     LTG 3 Progress New  -JS     LTG 4 Pt will demonstrate hip/LE strength to 4+/5 or greater for improved mechanics during functional, work related activities.  -JS     LTG 4 Progress New  -JS     LTG 5 Pt will be independent in postural awareness, proper body mechanics, including bending, lifting to reduce risk for reinjury during work, functional activities.  -JS     LTG 5 Progress New  -JS        Time Calculation    PT Goal Re-Cert Due Date 05/08/23  -JS           User Key  (r) = Recorded By, (t) = Taken By, (c) = Cosigned By    Initials Name Provider Type    Clemencia Newman, PT Physical Therapist                 PT Assessment/Plan     Row Name 02/07/23 0830          PT Assessment    Functional Limitations Impaired gait;Limitation in home management;Limitations in community activities;Limitations in functional capacity and performance;Performance in work activities;Performance  in self-care ADL  -JS     Impairments Gait;Impaired flexibility;Muscle strength;Pain;Poor body mechanics;Posture;Range of motion  -     Assessment Comments Sadi Reyes is a 47 y.o. year-old male referred to physical therapy for LBP, neck and shoulder pain, following work injury at [employer] on 9/14/22. He presents with a evolving clinical presentation,noting that R sided LBP & R LE pain are his primary issue at this time, though neck and R shoulder pain remain present.  Patient had received PHYSICAL THERAPY at [facility] to address neck & R shoulder pain as recently as this past Dec 2022, though patient reports that LBP & R LE pain began to interfere with his ability to attend treatment sessions. Patient is currently off work on worker's compensation.  Lumbar spine evaluation performed today, with patient demonstrating decreased lumbar ROM, decreased core/LE strength limited by pain, decreased LE flexibility, (+) slump and SLR on the right.  Patient does report decreased sensation R lower leg, but does not follow a specific dermatomal region. Patient demonstrates antalgic gait pattern, with R weight shifting limited by pain.  Patient will benefit from skilled physical therapy to address impairments and progress toward goals.  Will plan for formal assessment of cervical spine and R shoulder with appropriate goals to be established as indicated next visit, while continuing to address pt's primary issue of LBP, radicular pain.  -JS     Please refer to paper survey for additional self-reported information Yes  -JS     Rehab Potential Good  -JS     Patient/caregiver participated in establishment of treatment plan and goals Yes  -JS     Patient would benefit from skilled therapy intervention Yes  -JS        PT Plan    PT Frequency 2x/week  -JS     Predicted Duration of Therapy Intervention (PT) 8 visits  -JS     Physical Therapy Interventions (Optional Details) gait training;home exercise program;lumbar  stabilization;manual lymphatic drainage;manual therapy techniques;neuromuscular re-education;ROM (Range of Motion);strengthening;stair training;stretching  -JS     PT Plan Comments Continue PT.  Establish baseline measurements of cervical spine, R shoulder next treatment.  Consider NuStep warm up, sit<->stand, sciatic nerve gliding.  -JS           User Key  (r) = Recorded By, (t) = Taken By, (c) = Cosigned By    Initials Name Provider Type    Clemencia Newman, PT Physical Therapist                   OP Exercises     Row Name 02/07/23 0830             Subjective Comments    Subjective Comments Back pain is greatest area of pain. Pain extends down R LE. Also c/o neck pain & R shoulder pain. Received PT in the past for neck/shoulder- finished in Dec 2022.  -JS         Subjective Pain    Able to rate subjective pain? yes  -JS      Pre-Treatment Pain Level 5  -JS         Total Minutes    42181 - PT Therapeutic Exercise Minutes 10  -JS         Exercise 1    Exercise Name 1 LTR  -JS      Cueing 1 Verbal;Demo  -JS      Reps 1 10  -JS         Exercise 2    Exercise Name 2 PPT  -JS      Cueing 2 Verbal;Demo  -JS      Reps 2 10  -JS         Exercise 3    Exercise Name 3 Hooklying hip adduction  -JS      Cueing 3 Verbal;Demo  -JS      Reps 3 10  -JS      Time 3 5 sec  -JS      Additional Comments small ball  -JS         Exercise 4    Exercise Name 4 Hooklying hip abd  -JS      Cueing 4 Verbal;Demo  -JS      Reps 4 10  -JS      Time 4 YTB  -JS         Exercise 5    Exercise Name 5 TrA isometric  -JS      Cueing 5 Verbal;Tactile;Demo  -JS      Reps 5 10  -JS      Time 5 5 sec  -JS      Additional Comments to be performed in sitting, standing, hooklying  -JS            User Key  (r) = Recorded By, (t) = Taken By, (c) = Cosigned By    Initials Name Provider Type    Clemencia Newman, PT Physical Therapist                              Outcome Measure Options: Modified Oswestry, Neck Disability Index (NDI), Quick DASH  Quick DASH  Open a  tight or new jar.: Moderate Difficulty  Do heavy household chores (e.g., wash walls, wash floors): Moderate Difficulty  Carry a shopping bag or briefcase: Moderate Difficulty  Wash your back: Severe Difficulty  Use a knife to cut food: No Difficulty  Recreational activities in which you take some force or impact through your arm, should or hand (e.g. golf, hammering, tennis, etc.): Severe Difficulty  During the past week, to what extent has your arm, shoulder, or hand problem interfered with your normal social activites with family, friends, neighbors or groups?: Quite a bit  During the past week, were you limited in your work or other regular daily activities as a result of your arm, shoulder or hand problem?: Very limited  Arm, Shoulder, or hand pain: Severe  Tingling (pins and needles) in your arm, shoulder, or hand: Severe  During the past week, how much difficulty have you had sleeping because of the pain in your arm, shoulder or hand?: Moderate Difficiculty  Number of Questions Answered: 11  Quick DASH Score: 59.09  Modified Oswestry  Modified Oswestry Score/Comments: 41/50=82%  Neck Disability Index  Section 1 - Pain Intensity: The pain is moderate and does not vary much.  Section 2 - Personal Care: I can look after myself normally, but it causes extra pain.  Section 3 - Lifting: I can lift very light weights.  Section 4 - Work: I cannot do any work at all.  Section 5 - Headaches: I have severe headaches that come frequently.  Section 6 - Concentration: I have a fair degree of difficulty in concentrating when I want to.  Section 7 - Sleeping: My sleep is moderately disturbed (2-3 hours sleepless).  Section 8 - Driving: I cannot drive my car as long as I want because of moderate neck pain.  Section 9 - Reading: I cannot read as much as I want because of moderate neck pain.  Section 10 - Recreation: I am able to engage in a few of my usual recreational activities because of pain in my neck.  Neck Disability  Index Score: 31  Neck Disability Index Comments: 62%      Time Calculation:     Start Time: 0830  Stop Time: 0915  Time Calculation (min): 45 min  Timed Charges  69769 - PT Therapeutic Exercise Minutes: 10  Total Minutes  Timed Charges Total Minutes: 10   Total Minutes: 10     Therapy Charges for Today     Code Description Service Date Service Provider Modifiers Qty    48899657987 HC PT THER PROC EA 15 MIN 2/7/2023 Clemencia Jerry, PT GP 1    [id number] HC PT EVAL LOW COMPLEXITY 2 2/7/2023 Clemencia Jerry, PT GP 1          PT G-Codes  Outcome Measure Options: Modified Oswestry, Neck Disability Index (NDI), Quick DASH  Quick DASH Score: 59.09  Modified Oswestry Score/Comments: 41/50=82%  Neck Disability Index Score: 31         Clemencia Jerry, PT  2/7/2023

## 2023-02-08 NOTE — TELEPHONE ENCOUNTER
----- Message from Sadi Reyes sent at 5/31/2022  9:11 AM EDT -----  Regarding: Short term disability paper work   Good morning, was checking to see if my short term paper work for June 22 has been faxed.     Satisfactory

## 2023-02-13 ENCOUNTER — PREP FOR SURGERY (OUTPATIENT)
Dept: SURGERY | Facility: SURGERY CENTER | Age: 48
End: 2023-02-13
Payer: COMMERCIAL

## 2023-02-13 ENCOUNTER — OFFICE VISIT (OUTPATIENT)
Dept: PAIN MEDICINE | Facility: CLINIC | Age: 48
End: 2023-02-13
Payer: COMMERCIAL

## 2023-02-13 VITALS
HEIGHT: 71 IN | TEMPERATURE: 96.6 F | WEIGHT: 183.4 LBS | OXYGEN SATURATION: 96 % | BODY MASS INDEX: 25.68 KG/M2 | HEART RATE: 75 BPM | DIASTOLIC BLOOD PRESSURE: 86 MMHG | SYSTOLIC BLOOD PRESSURE: 132 MMHG | RESPIRATION RATE: 12 BRPM

## 2023-02-13 DIAGNOSIS — M51.16 LUMBAR DISC HERNIATION WITH RADICULOPATHY: Primary | ICD-10-CM

## 2023-02-13 PROCEDURE — 99214 OFFICE O/P EST MOD 30 MIN: CPT | Performed by: NURSE PRACTITIONER

## 2023-02-13 RX ORDER — DIAZEPAM 5 MG/1
10 TABLET ORAL ONCE
Status: CANCELLED | OUTPATIENT
Start: 2023-02-13

## 2023-02-13 NOTE — PROGRESS NOTES
CHIEF COMPLAINT  New patient evaluation Deborah Paloimno APRN.M54.16 (ICD-10-CM) - Lumbar radiculopathy.eval for injection for ARABELLA, translaminar vs right L5-S1 transforaminal ARABELLA.  Patient in office to evaluate lumbar pain onset  9/14/2022     Yue Reyes is a 47 y.o. male  who presents to the office for evaluation of low back pain. He was referred by CHARI Perez.      Mr. 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 His back pain was initially intermittent and was not his primary concern at that time. In December of last year his back pain significantly worsened.     Today his pain is 5/10VAS in severity. He describes his pain as continuous aching, burning, and numbing pain that starts at the top of his right buttock radiating into the posterior aspect of his right thigh, calf and heel.  He reports a loss of sensation in his right lower extremity as well. His pain is worsened by prolonged sitting, standing, activity, walking, bending, and twisting; it is improved by lying in a supine position.     Past pain medications: Steroid pack--no relief     Current pain medications: OTC Tylenol, Ibuprofen, OTC voltaren. This regimen is minimally helpful     Past therapies:  Physical Therapy: He has had his initial evaluation and starts PT for lumbar spine tomorrow.   Chiropractor: None  Massage Therapy: None  TENS: None  Neck or back surgery: None  Past pain management: None     Previous Injections: None    Back Pain  The current episode started more than 1 month ago. The problem occurs constantly. The problem has been gradually worsening since onset. The pain is present in the lumbar spine. The quality of the pain is described as aching and burning (numbing). The pain radiates to the right thigh and right foot (right calf). The pain is at a severity of 5/10. The symptoms are aggravated by standing, sitting, bending and  twisting (walking). Associated symptoms include headaches (migraines), numbness (right leg) and weakness (right leg). Pertinent negatives include no abdominal pain, chest pain, dysuria or fever. He has tried ice, heat, NSAIDs and analgesics for the symptoms.      PEG Assessment   What number best describes your pain on average in the past week?7  What number best describes how, during the past week, pain has interfered with your enjoyment of life?9  What number best describes how, during the past week, pain has interfered with your general activity?  9    Review of Pertinent Medical Data ---    FACILITY:  [facility]   UNIT/AGE/GENDER: G.CT  OP      AGE:47 Y          SEX:M   PATIENT NAME/:  MAKENZIE NUÑEZ R    1975   UNIT NUMBER:  [id number]   ACCOUNT NUMBER:  [id number]   ACCESSION NUMBER:  [id number]     CT OF THE LUMBAR SPINE WITHOUT CONTRAST, 2023 9:36 AM     HISTORY: Lumbar radiculopathy, symptoms persist with > 6 wks treatment     COMPARISON: None     INTRAVENOUS CONTRAST ADMINISTRATION (milliliters of Isovue): No IV contrast administered     TECHNIQUE: Helical CT was performed of the lumbar spine with axial coronal and sagittal reformatted images provided.  CT scans at this facility use dose modulation, iterative reconstruction and weight based dosing  to reduce radiation dose to as low as   reasonably achievable.  DICOM format image data is available to non-affiliated external healthcare facilities or entities on a secure, media-free, reciprocally searchable basis with patient authorization for at least a 12-month period after study.     FINDINGS:     No acute fracture or malalignment of the lumbar spine.     Advanced degenerative disc space narrowing at L5-S1 associated with a disc bulge. There is a locule of air within the right ventral epidural region at L5-S1 contiguous with the disc space suspicious for a right posterior disc herniation which appears to    contact and displace the traversing right S1 nerve root however this would be better characterized with MR imaging.     IMPRESSION:     No acute fracture or alignment abnormalities.     Severe degenerative disc space narrowing at L5-S1 with a disc bulge and a probable right posterior disc herniation causing right subarticular zone encroachment and possible displacement of the traversing right S1 nerve root. Given the right radiculopathy    symptoms, consider MRI lumbar spine for definitive characterization.     Dictated by: Ken Robin M.D.     Images and Report reviewed and interpreted by: Ken Robin M.D.     <PS><Electronically signed by: Ken Robin M.D.>   01/06/2023 1140     D: 01/06/2023 1128   T: 01/06/2023 1128      The following portions of the patient's history were reviewed and updated as appropriate: allergies, current medications, past family history, past medical history, past social history, past surgical history and problem list.    Review of Systems   Constitutional: Negative for activity change (less), fatigue and fever.   HENT: Negative for congestion.    Eyes: Negative for visual disturbance.   Respiratory: Negative for cough and chest tightness.    Cardiovascular: Negative for chest pain.   Gastrointestinal: Negative for abdominal pain, constipation and diarrhea.   Genitourinary: Negative for difficulty urinating and dysuria.   Musculoskeletal: Positive for back pain.   Neurological: Positive for weakness (right leg), numbness (right leg) and headaches (migraines). Negative for dizziness and light-headedness.   Psychiatric/Behavioral: Positive for agitation (pain) and sleep disturbance (pain). Negative for suicidal ideas. The patient is not nervous/anxious.      --  The aforementioned information the Chief Complaint section and above subjective data including any HPI data, and also the Review of Systems data, has been personally reviewed and affirmed.  --     Vitals:    02/13/23 0957  "  BP: 132/86   BP Location: Right arm   Patient Position: Sitting   Cuff Size: Large Adult   Pulse: 75   Resp: 12   Temp: 96.6 °F (35.9 °C)   TempSrc: Temporal   SpO2: 96%   Weight: 83.2 kg (183 lb 6.4 oz)   Height: 180.3 cm (71\")   PainSc:   5     Objective   Physical Exam  Vitals and nursing note reviewed.   Constitutional:       Appearance: Normal appearance. He is well-developed.   Eyes:      General: Lids are normal.   Cardiovascular:      Rate and Rhythm: Normal rate.   Pulmonary:      Effort: Pulmonary effort is normal.   Musculoskeletal:      Lumbar back: Tenderness present. No bony tenderness. Decreased range of motion. Positive right straight leg raise test. Negative left straight leg raise test.   Neurological:      Mental Status: He is alert and oriented to person, place, and time.   Psychiatric:         Speech: Speech normal.         Behavior: Behavior normal.         Judgment: Judgment normal.       Assessment & Plan   Diagnoses and all orders for this visit:    1. Lumbar disc herniation with radiculopathy (Primary)      --- Start PT, if no improvement with PT then the plan will be to proceed with Right S1 TFESI.    --- Right S1 TFESI   Reviewed the procedure at length with the patient.  Included in the review was expectations, complications, risk and benefits.The procedure was described in detail and the risks, benefits and alternatives were discussed with the patient (including but not limited to: bleeding, infection, nerve damage, worsening of pain, inability to perform injection, paralysis, seizures, coma, no pain relief and death) who agreed to proceed.  Discussed the potential for sedation if warranted/wanted.  The procedure will plan to be performed at [facility] with fluoroscopic guidance(unless ultrasound is indicated) and could potentially have steroids and contrast dye used. Questions were answered and in a way the patient could understand.  Patient verbalized " understanding and wishes to proceed.  This intervention will be ordered.  Discussed with patient that all procedures are part of a multimodal plan of care and include either formal PT or a home exercise program.  Patient has no evidence of coagulopathy or current infection.    --- Follow-up after procedure     JEFFERY REPORT    As the clinician, I personally reviewed the JEFFERY from 2/13/2023 while the patient was in the office today.    Dictated utilizing Dragon dictation.      Patient remained masked during entire encounter. No cough present. I donned a mask and eye protection throughout entire visit. Prior to donning mask and eye protection, hand hygiene was performed, as well as when it was doffed.  I was closer than 6 feet, but not for an extended period of time. No obvious exposure to any bodily fluids.

## 2023-02-14 ENCOUNTER — HOSPITAL ENCOUNTER (OUTPATIENT)
Dept: PHYSICAL THERAPY | Facility: HOSPITAL | Age: 48
Setting detail: THERAPIES SERIES
Discharge: HOME | End: 2023-02-14
Payer: OTHER MISCELLANEOUS

## 2023-02-14 ENCOUNTER — OFFICE VISIT (OUTPATIENT)
Dept: ORTHOPEDIC SURGERY | Facility: CLINIC | Age: 48
End: 2023-02-14
Payer: OTHER MISCELLANEOUS

## 2023-02-14 VITALS — WEIGHT: 186.2 LBS | BODY MASS INDEX: 26.07 KG/M2 | TEMPERATURE: 97.1 F | HEIGHT: 71 IN

## 2023-02-14 DIAGNOSIS — M54.41 RIGHT-SIDED LOW BACK PAIN WITH RIGHT-SIDED SCIATICA, UNSPECIFIED CHRONICITY: Primary | ICD-10-CM

## 2023-02-14 DIAGNOSIS — S46.011D TRAUMATIC TEAR OF RIGHT ROTATOR CUFF, UNSPECIFIED TEAR EXTENT, SUBSEQUENT ENCOUNTER: Primary | ICD-10-CM

## 2023-02-14 DIAGNOSIS — M25.511 RIGHT SHOULDER PAIN, UNSPECIFIED CHRONICITY: ICD-10-CM

## 2023-02-14 DIAGNOSIS — M54.2 PAIN, NECK: ICD-10-CM

## 2023-02-14 DIAGNOSIS — M54.2 NECK PAIN: ICD-10-CM

## 2023-02-14 PROCEDURE — 97140 MANUAL THERAPY 1/> REGIONS: CPT | Performed by: PHYSICAL THERAPIST

## 2023-02-14 PROCEDURE — 97110 THERAPEUTIC EXERCISES: CPT | Performed by: PHYSICAL THERAPIST

## 2023-02-14 PROCEDURE — 99213 OFFICE O/P EST LOW 20 MIN: CPT | Performed by: ORTHOPAEDIC SURGERY

## 2023-02-14 PROCEDURE — 97530 THERAPEUTIC ACTIVITIES: CPT | Performed by: PHYSICAL THERAPIST

## 2023-02-14 NOTE — PROGRESS NOTES
Patient: Sadi Reyes  YOB: 1975  Date of Service: 2/14/2023    Chief Complaints: Right shoulder and neck pain    Subjective:    History of Present Illness: Pt is seen in the office today with complaints of right shoulder and neck pain I do think some of it today seems a little more shoulder he is working up his neck was showing and perhaps he has pathology in both shoulder still bothering him he finally did get to start physical therapy        Allergies:   Allergies   Allergen Reactions   • Inderal La [Propranolol] Diarrhea and Nausea And Vomiting       Medications:   Home Medications:  Current Outpatient Medications on File Prior to Visit   Medication Sig   • amLODIPine (NORVASC) 10 MG tablet Take 1 tablet by mouth Daily.   • atorvastatin (LIPITOR) 20 MG tablet TAKE 1 TABLET BY MOUTH EVERY DAY   • escitalopram (Lexapro) 10 MG tablet Take 1 tablet by mouth Daily.   • esomeprazole (nexIUM) 40 MG capsule Take 1 capsule by mouth 2 (Two) Times a Day Before Meals.     No current facility-administered medications on file prior to visit.     Current Medications:  Scheduled Meds:  Continuous Infusions:No current facility-administered medications for this visit.    PRN Meds:.    I have reviewed the patient's medical history in detail and updated the computerized patient record.  Review and summarization of old records include:    Past Medical History:   Diagnosis Date   • Acute nephritis    • Acute pyelonephritis    • VANESSA (acute kidney injury) (HCC)    • Carpal tunnel syndrome    • Cluster headache    • Epigastric pain     epigastric/chest pain   • Headache    • Headache, tension-type    • Hyperlipidemia    • Hypertension    • Migraine    • Palpitations    • Syncope         Past Surgical History:   Procedure Laterality Date   • BUNIONECTOMY     • COLONOSCOPY N/A 11/17/2022    Procedure: COLONOSCOPY TO CECUM AND TERMINAL ILEUM;  Surgeon: Tono Romero MD;  Location: [facility] ENDOSCOPY;  Service:  Gastroenterology;  Laterality: N/A;  PRE- SCREENING  POST- HEMORRHOIDS   • ENDOSCOPY N/A 11/02/2020    Procedure: ESOPHAGOGASTRODUODENOSCOPY with biopsies;  Surgeon: Tono Romero MD;  Location: [facility] ENDOSCOPY;  Service: Gastroenterology;  Laterality: N/A;  Pre: epigastric pain  Post: duodenitis, gastritis, hiatal hernia   • FOOT SURGERY     • HERNIA REPAIR          Social History     Occupational History   • Not on file   Tobacco Use   • Smoking status: Never   • Smokeless tobacco: Never   • Tobacco comments:     rare caffeine   Vaping Use   • Vaping Use: Never used   Substance and Sexual Activity   • Alcohol use: Yes     Comment: Twice a month   • Drug use: Yes     Frequency: 1.0 times per week     Types: Marijuana     Comment: uses daily   • Sexual activity: Yes     Partners: Female      Social History     Social History Narrative   • Not on file        Family History   Problem Relation Age of Onset   • Hypertension Mother    • No Known Problems Father    • No Known Problems Sister    • Prostate cancer Maternal Grandfather        ROS: 14 point review of systems was performed and was negative except for documented findings in HPI and today's encounter.     Allergies:   Allergies   Allergen Reactions   • Inderal La [Propranolol] Diarrhea and Nausea And Vomiting     Constitutional:  Denies fever, shaking or chills   Eyes:  Denies change in visual acuity   HENT:  Denies nasal congestion or sore throat   Respiratory:  Denies cough or shortness of breath   Cardiovascular:  Denies chest pain or severe LE edema   GI:  Denies abdominal pain, nausea, vomiting, bloody stools or diarrhea   Musculoskeletal:  Numbness, tingling, or loss of motor function only as noted above in history of present illness.  : Denies painful urination or hematuria  Integument:  Denies rash, lesion or ulceration   Neurologic:  Denies headache or focal weakness  Endocrine:  Denies lymphadenopathy  Psych:  Denies confusion or change in mental  status   Hem:  Denies active bleeding      Physical Exam: 47 y.o. male  Wt Readings from Last 3 Encounters:   02/13/23 83.2 kg (183 lb 6.4 oz)   02/06/23 81.9 kg (180 lb 9.6 oz)   02/01/23 81.6 kg (180 lb)       There is no height or weight on file to calculate BMI.    There were no vitals filed for this visit.  Vital signs reviewed.   General Appearance:    Alert, cooperative, in no acute distress                    Ortho exam    Physical exam of the right shoulder reveals no overlying skin changes no lymphedema no lymphadenopathy.  Patient has active flexion 180 with mild symptoms abduction is similar external rotation is to 50 and internal rotation to the upper lumbar spine with mild symptoms.  Patient has good rotator cuff strength 4+ over 5 with isometric strength testing with pain.  Patient has a positive impingement and a positive Pack sign.  Patient has good cervical range of motion which is full and asymptomatic no radicular symptoms.  Patient has a normal elbow exam.  Good distal pulses are present  Patient has pain with overhead activity and a positive Neer sign and a positive empty can sign , a positive drop arm and a definitive painful arc  He does have pain with stressing of his biceps itself and also the biceps anchor           Assessment: Persistent right shoulder pain despite time at rest therapy again, I do think there is both shoulder and neck pathology plan is to proceed with a CT arthrogram of his right shoulder he cannot have an MRI    Plan: Is as above  Follow up as indicated.  Ice, elevate, and rest as needed.  Discussed conservative measures of pain control including ice, bracing.    Gardenia Norris M.D.

## 2023-02-14 NOTE — THERAPY TREATMENT NOTE
Outpatient Physical Therapy Ortho Progress Note  [facility]     Patient Name: Sadi Reyes  : 1975  MRN: [id number]  Today's Date: 2023      Visit Date: 2023    Visit Dx:    ICD-10-CM ICD-9-CM   1. Right-sided low back pain with right-sided sciatica, unspecified chronicity  M54.41 724.3   2. Right shoulder pain, unspecified chronicity  M25.511 719.41   3. Neck pain  M54.2 723.1   4. Pain, neck  M54.2 723.1       Patient Active Problem List   Diagnosis   • Acute pyelonephritis   • VANESSA (acute kidney injury) (Formerly [facility])   • Leucocytosis   • Dehydration   • Nausea vomiting and diarrhea   • Acute abdominal pain   • Stage 3a chronic kidney disease (HCC)   • Hypertension   • Diastolic dysfunction without heart failure   • Acute gastritis without bleeding   • Acute duodenitis   • Non-cardiac chest pain   • Cervicogenic headache   • Vasovagal syncope   • Encounter for screening for malignant neoplasm of colon   • Lumbar disc herniation with radiculopathy        Past Medical History:   Diagnosis Date   • Acute nephritis    • Acute pyelonephritis    • VANESSA (acute kidney injury) (Formerly [facility])    • Carpal tunnel syndrome    • Cluster headache    • Epigastric pain     epigastric/chest pain   • Headache    • Headache, tension-type    • Hyperlipidemia    • Hypertension    • Migraine    • Palpitations    • Syncope         Past Surgical History:   Procedure Laterality Date   • BUNIONECTOMY     • COLONOSCOPY N/A 2022    Procedure: COLONOSCOPY TO CECUM AND TERMINAL ILEUM;  Surgeon: Tono Romero MD;  Location: [facility] ENDOSCOPY;  Service: Gastroenterology;  Laterality: N/A;  PRE- SCREENING  POST- HEMORRHOIDS   • ENDOSCOPY N/A 2020    Procedure: ESOPHAGOGASTRODUODENOSCOPY with biopsies;  Surgeon: Tono Romero MD;  Location: [facility] ENDOSCOPY;  Service: Gastroenterology;  Laterality: N/A;  Pre: epigastric pain  Post: duodenitis, gastritis, hiatal hernia   • FOOT SURGERY     • HERNIA REPAIR          PT Ortho      Row Name 02/14/23 0900       Posture/Observations    Forward Head Mild  -GJ    Rounded Shoulders Bilateral:;Mild  -GJ       Quarter Clearing    Quarter Clearing Upper Quarter Clearing  -GJ       DTR- Upper Quarter Clearing    Biceps (C5/6) Bilateral:;2- Normal response  -GJ    Brachioradialis (C6) Bilateral:;2- Normal response  -GJ    Triceps (C7) Bilateral:;2- Normal response  -GJ       Myotomal Screen- Upper Quarter Clearing    Shoulder flexion (C5) Left:;4+ (Good +);Right:;4 (Good)  within available range to isometric testing  -GJ    Elbow flexion/wrist extension (C6) Bilateral:;4+ (Good +)  -GJ    Elbow extension/wrist flexion (C7) Bilateral:;4+ (Good +)  -GJ    Finger abduction (T1) Bilateral:;4+ (Good +)  -GJ       Cervical/Shoulder ROM Screen    Cervical flexion Normal  painful end range  -GJ    Cervical extension Normal  painful end range  -GJ    Cervical rotation Normal  L 75, R 60  -GJ       Special Tests/Palpation    Special Tests/Palpation Shoulder;Cervical/Thoracic  -GJ       Cervical Palpation    Cervical Palpation- Location? Suboccipital;Levator scapula;Upper traps  -GJ    Suboccipital Bilateral:;Tender  -GJ    Levator Scapula Bilateral:;Tender  -GJ    Upper Traps Bilateral:;Tender  -GJ       Cervical/Thoracic Special Tests    Spurlings (Foraminal Compression) --  guarded bilaterally  -GJ       Shoulder Impingement/Rotator Cuff Special Tests    Pack-Herb Test (RC Lesion vs. Bursitis) Right:;Negative  -GJ    Full Can Test (RC Lesion) Right:;Negative  -GJ    Empty Can Test (RC Lesion) Right:;Negative  -GJ       General ROM    RT Upper Ext Rt Shoulder ABduction;Rt Shoulder Flexion;Rt Shoulder External Rotation;Rt Shoulder Internal Rotation  -GJ    LT Upper Ext Lt Shoulder ABduction;Lt Shoulder Flexion;Lt Shoulder External Rotation;Lt Shoulder Internal Rotation  -GJ       Right Upper Ext    Rt Shoulder Abduction AROM 105 seated  -GJ    Rt Shoulder Abduction PROM 140 supine, very guarded  -GJ     Rt Shoulder Flexion AROM 115 seated  -GJ    Rt Shoulder Flexion PROM 150 supine very guarded  -GJ    Rt Shoulder External Rotation AROM MIREYA unable to get to head  -GJ    Rt Shoulder External Rotation PROM 40, supine pos, very guarded  -GJ    Rt Shoulder Internal Rotation AROM FIR ipsilateral hip pocket  -GJ    Rt Shoulder Internal Rotation PROM 40 supine, pos very guarded  -GJ       Left Upper Ext    Lt Shoulder Abduction AROM 170 seate  -GJ    Lt Shoulder Abduction PROM 185 supine  -GJ    Lt Shoulder Flexion AROM 165 seated  -GJ    Lt Shoulder Flexion PROM 180 supine  -GJ    Lt Shoulder External Rotation AROM MIREYA midline T 3  -GJ    Lt Shoulder External Rotation PROM 95 supine, pos,  -GJ    Lt Shoulder Internal Rotation AROM FIR midline T 5  -GJ    Lt Shoulder Internal Rotation PROM 80 supine pos  -GJ       MMT (Manual Muscle Testing)    Rt Upper Ext Rt Shoulder ABduction;Rt Shoulder Internal Rotation;Rt Shoulder External Rotation  -GJ    Lt Upper Ext Lt Shoulder ABduction;Lt Shoulder External Rotation;Lt Shoulder Internal Rotation  -GJ    General MMT Comments bilateral elbow/wrist AROM grossly symetric and WFL  -GJ       MMT Right Upper Ext    Rt Shoulder ABduction MMT, Gross Movement (4/5) good  within available range to isometric testing  -GJ    Rt Shoulder Internal Rotation MMT, Gross Movement (4+/5) good plus  -GJ    Rt Shoulder External Rotation MMT, Gross Movement (4+/5) good plus  -GJ       MMT Left Upper Ext    Lt Shoulder ABduction MMT, Gross Movement (5/5) normal  -GJ    Lt Shoulder Internal Rotation MMT, Gross Movement (5/5) normal  -GJ    Lt Shoulder External Rotation MMT, Gross Movement (5/5) normal  -GJ        Strength Right    # Reps 3  -GJ    Right Rung 2  -GJ    Right  Test 1 30  -GJ    Right  Test 2 25  -GJ    Right  Test 3 35  -GJ     Strength Average Right 30  -GJ        Strength Left    # Reps 3  -GJ    Left Rung 2  -GJ    Left  Test 1 65  -GJ    Left  Test 2 40   -GJ    Left  Test 3 40  -GJ     Strength Average Left 48.33  -GJ       Flexibility    Flexibility Tested? Upper Extremity  -GJ       Upper Extremity Flexibility    Suboccipitals Bilateral:;Mildly limited;Moderately limited  -GJ    Scalenes Bilateral:;Moderately limited;Mildly limited  -GJ    Upper Trapezius Bilateral:;Mildly limited;Moderately limited  -GJ    Levator Scapula Bilateral:;Mildly limited;Moderately limited  -GJ    Pect Minor Bilateral:;Mildly limited;Moderately limited  -GJ    Pect Major Bilateral:;Mildly limited;Moderately limited  -GJ    Latissimus Dorsi Bilateral:;Moderately limited  -GJ       Hand  Strength     Strength Affected Side Bilateral  -GJ          User Key  (r) = Recorded By, (t) = Taken By, (c) = Cosigned By    Initials Name Provider Type    GJ Jr Kaur, PT Physical Therapist                             PT Assessment/Plan     Row Name 02/14/23 1006          PT Assessment    Assessment Comments Mr. Reyes is a 48 y/o R handed male.  He returns for his first follow up session of PT for his LBP/R shoulde/R sided c spine pain.  We assessed his cervical spine/R shoulder today, see ortho section.  He demosntrates signifcanta limitations in R shoulder A/PROM, good strength in available range.  We performed trial of trigger point release to R posterior/lateral hip girdle tissue with elevated pain response today. Encouraged pt to continue current flexibility gentle core exercises.  He is to see ortho today for further assessment of his R shoulder. Mr. Reyes continues to be a candidate for skilled physical therapy.  -        PT Plan    PT Plan Comments assess response to STM/trigger point release to R posterior hip girdle, repeat if beneficial. Assess Dr. Norris visit, Greater focus on active vs. passive modalities. warm up on Nustep, HS stretch, 90/90 with DF for neural gliding, initiate supine AAROm flexionwith cane, chest press, SL ER, FIR with towel stretch, HL TA   -GJ           User Key  (r) = Recorded By, (t) = Taken By, (c) = Cosigned By    Initials Name Provider Type    Jr Marc, PT Physical Therapist                   OP Exercises     Row Name 02/14/23 0914 02/14/23 0900          Subjective Comments    Subjective Comments -- I'm having pain in my R leg, especially after I drive for 20 min or so, from my hip to my foot  -GJ        Total Minutes    35862 - PT Therapeutic Exercise Minutes 10  -GJ --     76084 - PT Therapeutic Activity Minutes 20  -GJ --     94740 - PT Manual Therapy Minutes 13  -GJ --        Exercise 1    Exercise Name 1 -- Nustep  -GJ     Time 1 -- 5 min  -GJ     Additional Comments -- UE/LE  -GJ        Exercise 6    Exercise Name 6 -- LTR  -GJ     Cueing 6 -- Verbal;Demo  -GJ     Reps 6 -- 10  -GJ     Time 6 -- 5s  -GJ        Exercise 7    Exercise Name 7 -- piriformis stretch  -GJ     Cueing 7 -- Verbal;Demo  -GJ     Reps 7 -- 3  -GJ     Time 7 -- 20s  -GJ        Exercise 8    Exercise Name 8 -- assessement of c spine/R shoulder  -GJ           User Key  (r) = Recorded By, (t) = Taken By, (c) = Cosigned By    Initials Name Provider Type    Jr Marc, PT Physical Therapist                         Manual Rx (last 36 hours)     Manual Treatments     Row Name 02/14/23 0914 02/14/23 0900          Total Minutes    71163 - PT Manual Therapy Minutes 13  -GJ --        Manual Rx 1    Manual Rx 1 Location -- STM/trigger point release to R posterior/lateral hip girdle tissue,  -GJ     Manual Rx 1 Type -- pt in L SL with pillow between knees  -GJ           User Key  (r) = Recorded By, (t) = Taken By, (c) = Cosigned By    Initials Name Provider Type    Jr Marc, PT Physical Therapist                 PT OP Goals     Row Name 02/14/23 0900          PT Short Term Goals    STG Date to Achieve 03/09/23  -GJ     STG 1 Pt will be independent and compliant in initial HEP.  -GJ     STG 1 Progress Ongoing  -GJ     STG 2 Pt will demonstrate sit<->stand  with proper core stabilization, minimal UE use.  -GJ     STG 2 Progress Ongoing  -GJ     STG 3 Pt will demonstrate proper postural awareness with sitting, standing activities.  -GJ     STG 3 Progress Ongoing  -GJ        Long Term Goals    LTG Date to Achieve 05/08/23  -GJ     LTG 1 Pt will be independent in comprehensive HEP to allow ongoing self-management of condition.  -GJ     LTG 1 Progress Ongoing  -GJ     LTG 2 Pt will normalize ambulation, tolerating walking >/=20 min without increased symptoms for improved tolerance to functional and work related activities.  -GJ     LTG 2 Progress Ongoing  -GJ     LTG 3 Pt will improve lumbar spine AROM to 75% or greater for improved functional mobility.  -GJ     LTG 3 Progress Ongoing  -GJ     LTG 4 Pt will demonstrate hip/LE strength to 4+/5 or greater for improved mechanics during functional, work related activities.  -GJ     LTG 4 Progress Ongoing  -GJ     LTG 5 Pt will be independent in postural awareness, proper body mechanics, including bending, lifting to reduce risk for reinjury during work, functional activities.  -GJ     LTG 5 Progress Ongoing  -GJ     LTG 6 pt to demonstrate R girp strength (average of 3) >/= 45# to faciliate ease with performing daily activities  -GJ     LTG 6 Progress New  -GJ     LTG 7 pt to demosntrate R shoulder AROM flexion/abd >/= 155 to facilitate ease with performing dressing/household activities  -GJ     LTG 7 Progress New  -GJ     LTG 8 pt to demonstrate R shoulder AROM FIR >/= midline T12 to facilitate ease with dressing  -GJ     LTG 8 Progress New  -GJ           User Key  (r) = Recorded By, (t) = Taken By, (c) = Cosigned By    Initials Name Provider Type    Jr Marc, PT Physical Therapist                Therapy Education  Education Details: reviewed assessement of neck/shoulder, encouraged mobility discssed self trigger point massage, discussed risks/benefits of DDN  Given: HEP, Symptoms/condition management, Pain  management, Posture/body mechanics, Mobility training  Program: Reinforced, New  How Provided: Verbal, Demonstration  Provided to: Patient  Level of Understanding: Verbalized, Teach back education performed, Demonstrated              Time Calculation:   Start Time: 0915  Stop Time: 1000  Time Calculation (min): 45 min  Timed Charges  59310 - PT Therapeutic Exercise Minutes: 10  81940 - PT Manual Therapy Minutes: 13  03658 - PT Therapeutic Activity Minutes: 20  Total Minutes  Timed Charges Total Minutes: 43   Total Minutes: 43  Therapy Charges for Today     Code Description Service Date Service Provider Modifiers Qty    35920656837 HC PT THER PROC EA 15 MIN 2/14/2023 Jr Kaur, PT GP 1    35552104866 HC PT THERAPEUTIC ACT EA 15 MIN 2/14/2023 Jr Kaur, PT GP 1    60585096538 HC PT MANUAL THERAPY EA 15 MIN 2/14/2023 Jr Kaur, PT GP 1                    Jr Kaur, PT  2/14/2023

## 2023-02-21 ENCOUNTER — HOSPITAL ENCOUNTER (OUTPATIENT)
Dept: PHYSICAL THERAPY | Facility: HOSPITAL | Age: 48
Setting detail: THERAPIES SERIES
Discharge: HOME | End: 2023-02-21
Payer: OTHER MISCELLANEOUS

## 2023-02-21 DIAGNOSIS — M54.41 RIGHT-SIDED LOW BACK PAIN WITH RIGHT-SIDED SCIATICA, UNSPECIFIED CHRONICITY: Primary | ICD-10-CM

## 2023-02-21 DIAGNOSIS — M25.511 RIGHT SHOULDER PAIN, UNSPECIFIED CHRONICITY: ICD-10-CM

## 2023-02-21 DIAGNOSIS — M54.2 PAIN, NECK: ICD-10-CM

## 2023-02-21 DIAGNOSIS — M54.2 NECK PAIN: ICD-10-CM

## 2023-02-21 PROCEDURE — 97110 THERAPEUTIC EXERCISES: CPT

## 2023-02-21 PROCEDURE — 97140 MANUAL THERAPY 1/> REGIONS: CPT

## 2023-02-21 NOTE — THERAPY TREATMENT NOTE
Outpatient Physical Therapy Ortho Treatment Note  [facility]     Patient Name: Sadi Graves  : 1975  MRN: [id number]  Today's Date: 2023      Visit Date: 2023    Visit Dx:    ICD-10-CM ICD-9-CM   1. Right-sided low back pain with right-sided sciatica, unspecified chronicity  M54.41 724.3   2. Right shoulder pain, unspecified chronicity  M25.511 719.41   3. Neck pain  M54.2 723.1   4. Pain, neck  M54.2 723.1       Patient Active Problem List   Diagnosis   • Acute pyelonephritis   • VANESSA (acute kidney injury) (Formerly [facility])   • Leucocytosis   • Dehydration   • Nausea vomiting and diarrhea   • Acute abdominal pain   • Stage 3a chronic kidney disease (HCC)   • Hypertension   • Diastolic dysfunction without heart failure   • Acute gastritis without bleeding   • Acute duodenitis   • Non-cardiac chest pain   • Cervicogenic headache   • Vasovagal syncope   • Encounter for screening for malignant neoplasm of colon   • Lumbar disc herniation with radiculopathy        Past Medical History:   Diagnosis Date   • Acute nephritis    • Acute pyelonephritis    • VANESSA (acute kidney injury) (Formerly [facility])    • Carpal tunnel syndrome    • Cluster headache    • Epigastric pain     epigastric/chest pain   • Headache    • Headache, tension-type    • Hyperlipidemia    • Hypertension    • Migraine    • Palpitations    • Syncope         Past Surgical History:   Procedure Laterality Date   • BUNIONECTOMY     • COLONOSCOPY N/A 2022    Procedure: COLONOSCOPY TO CECUM AND TERMINAL ILEUM;  Surgeon: Tono Romero MD;  Location: [facility] ENDOSCOPY;  Service: Gastroenterology;  Laterality: N/A;  PRE- SCREENING  POST- HEMORRHOIDS   • ENDOSCOPY N/A 2020    Procedure: ESOPHAGOGASTRODUODENOSCOPY with biopsies;  Surgeon: Tono Romero MD;  Location: [facility] ENDOSCOPY;  Service: Gastroenterology;  Laterality: N/A;  Pre: epigastric pain  Post: duodenitis, gastritis, hiatal hernia   • FOOT SURGERY     • HERNIA REPAIR                           PT Assessment/Plan     Row Name 02/21/23 0900          PT Assessment    Assessment Comments Mr. Reyes returns to clinic with no new complaints, back pain remains moderate 7/10 with some aggravation with shoulder over weekend. Continued manual interventions on R hip girdle with good benefit. Focused on total body interventions inclueding shoulder stabilization, controlled thoracolumbar mobility and improved TrA engagement/core control. He reports improved symptoms at conclusion of session. He reports complinace with HEP, benefits from cueing on progressed exercises to prevent over recruitment particualrly with PPT, bridges, and alt UE shoulder extension. He will continue to benefit from skilled PT.  -        PT Plan    PT Plan Comments 90/90 sciatic floss, post shoulder mobs, rows?  -           User Key  (r) = Recorded By, (t) = Taken By, (c) = Cosigned By    Initials Name Provider Type     Halle Baires, PT Physical Therapist                   OP Exercises     Row Name 02/21/23 0900             Subjective Comments    Subjective Comments My back is about a 7/10, my shoulder has bene bothering me since the weekend really  -         Subjective Pain    Able to rate subjective pain? yes  -      Pre-Treatment Pain Level 7  -MH         Total Minutes    46412 - PT Therapeutic Exercise Minutes 39  -MH      82757 - PT Manual Therapy Minutes 14  -MH         Exercise 1    Exercise Name 1 Nustep  -      Cueing 1 Verbal;Demo  -MH      Time 1 5 min  -      Additional Comments UE/LE  -MH         Exercise 2    Exercise Name 2 LTR  -      Cueing 2 Verbal;Demo  -MH      Reps 2 10ea  -MH      Time 2 5sec  -         Exercise 3    Exercise Name 3 piriformis/fig 4 stretch  -      Cueing 3 Verbal;Demo  -MH      Reps 3 3e  -MH      Time 3 20sec  -         Exercise 4    Exercise Name 4 beach chair stretch  -      Cueing 4 Verbal;Demo  -MH      Reps 4 10  -MH      Time 4 10sec  -MH          Exercise 5    Exercise Name 5 S/L thoracolumbar rotation  -      Cueing 5 Verbal;Demo  -      Reps 5 10e  -MH      Time 5 5sec  -      Additional Comments when rotating to R, elbow bent 2' pain but progressed to  arm extended  -         Exercise 6    Exercise Name 6 PPT  -MH      Cueing 6 Verbal;Demo  -      Reps 6 10  -MH      Time 6 5sec  -         Exercise 7    Exercise Name 7 supine ABC  -      Cueing 7 Verbal;Demo  -      Sets 7 1  -MH      Reps 7 A-Z  -      Time 7 2#  -      Additional Comments cued to maintain TrA engagement  -         Exercise 8    Exercise Name 8 bridge w/ OH DB hold  -MH      Cueing 8 Verbal;Demo  -      Reps 8 15  -MH      Time 8 2# DB  -         Exercise 9    Exercise Name 9 alt shoulder ext  -      Cueing 9 Verbal;Demo;Tactile  -      Reps 9 10e  -MH      Time 9 RTB  -      Additional Comments cues to reduce UUT recriutment, posterior lean  -         Exercise 10    Exercise Name 10 door way stretch  -      Cueing 10 Verbal;Demo  -      Reps 10 3  -MH      Time 10 20sec  -      Additional Comments low V  -            User Key  (r) = Recorded By, (t) = Taken By, (c) = Cosigned By    Initials Name Provider Type     Halle Baires, PT Physical Therapist                         Manual Rx (last 36 hours)     Manual Treatments     Row Name 02/21/23 0900             Total Minutes    81265 - PT Manual Therapy Minutes 14  -         Manual Rx 1    Manual Rx 1 Location STM/trigger point release to R posterior/lateral hip girdle tissue,  -      Manual Rx 1 Type pt in L SL with pillow between knees  -            User Key  (r) = Recorded By, (t) = Taken By, (c) = Cosigned By    Initials Name Provider Type     Halle Baires, PT Physical Therapist                 PT OP Goals     Row Name 02/21/23 0900          PT Short Term Goals    STG Date to Achieve 03/09/23  -     STG 1 Pt will be independent and compliant in initial HEP.  -     STG 1  Progress Ongoing  -     STG 1 Progress Comments reporting compliance, able to verbalize current exercises  -     STG 2 Pt will demonstrate sit<->stand with proper core stabilization, minimal UE use.  -     STG 2 Progress Ongoing  -     STG 3 Pt will demonstrate proper postural awareness with sitting, standing activities.  -     STG 3 Progress Ongoing  -        Long Term Goals    LTG Date to Achieve 05/08/23  -     LTG 1 Pt will be independent in comprehensive HEP to allow ongoing self-management of condition.  -     LTG 1 Progress Ongoing  -     LTG 2 Pt will normalize ambulation, tolerating walking >/=20 min without increased symptoms for improved tolerance to functional and work related activities.  -     LTG 2 Progress Ongoing  -     LTG 3 Pt will improve lumbar spine AROM to 75% or greater for improved functional mobility.  -     LTG 3 Progress Ongoing  -     LTG 4 Pt will demonstrate hip/LE strength to 4+/5 or greater for improved mechanics during functional, work related activities.  -     LTG 4 Progress Ongoing  -     LTG 5 Pt will be independent in postural awareness, proper body mechanics, including bending, lifting to reduce risk for reinjury during work, functional activities.  -     LTG 5 Progress Ongoing  -     LTG 6 pt to demonstrate R girp strength (average of 3) >/= 45# to faciliate ease with performing daily activities  -     LTG 6 Progress New  -     LTG 7 pt to demosntrate R shoulder AROM flexion/abd >/= 155 to facilitate ease with performing dressing/household activities  -     LTG 7 Progress New  [facility]     LTG 8 pt to demonstrate R shoulder AROM FIR >/= midline T12 to facilitate ease with dressing  -     LTG 8 Progress New  [facility]           User Key  (r) = Recorded By, (t) = Taken By, (c) = Cosigned By    Initials Name Provider Type    Halle Reid, PT Physical Therapist                Therapy Education  Education Details: Updated HEP Access Code:  [email or web address]  Given: HEP, Symptoms/condition management  Program: Reinforced, Progressed  How Provided: Verbal, Demonstration, Written  Provided to: Patient  Level of Understanding: Verbalized, Demonstrated              Time Calculation:   Start Time: 0900  Stop Time: 0953  Time Calculation (min): 53 min  Total Timed Code Minutes- PT: 53 minute(s)  Timed Charges  61127 - PT Therapeutic Exercise Minutes: 39  02471 - PT Manual Therapy Minutes: 14  Total Minutes  Timed Charges Total Minutes: 53   Total Minutes: 53  Therapy Charges for Today     Code Description Service Date Service Provider Modifiers Qty    82184159536 HC PT MANUAL THERAPY EA 15 MIN 2/21/2023 Halle Baires, PT GP 1    67617909986 HC PT THER PROC EA 15 MIN 2/21/2023 Halle Baires, PT GP 3                    Halle Baires PT  2/21/2023

## 2023-02-28 ENCOUNTER — TRANSCRIBE ORDERS (OUTPATIENT)
Dept: SURGERY | Facility: SURGERY CENTER | Age: 48
End: 2023-02-28
Payer: COMMERCIAL

## 2023-02-28 ENCOUNTER — HOSPITAL ENCOUNTER (OUTPATIENT)
Dept: PHYSICAL THERAPY | Facility: HOSPITAL | Age: 48
Setting detail: THERAPIES SERIES
Discharge: HOME | End: 2023-02-28
Payer: COMMERCIAL

## 2023-02-28 DIAGNOSIS — M54.41 RIGHT-SIDED LOW BACK PAIN WITH RIGHT-SIDED SCIATICA, UNSPECIFIED CHRONICITY: Primary | ICD-10-CM

## 2023-02-28 DIAGNOSIS — M25.511 RIGHT SHOULDER PAIN, UNSPECIFIED CHRONICITY: ICD-10-CM

## 2023-02-28 DIAGNOSIS — M54.2 PAIN, NECK: ICD-10-CM

## 2023-02-28 DIAGNOSIS — Z41.9 SURGERY, ELECTIVE: Primary | ICD-10-CM

## 2023-02-28 DIAGNOSIS — M54.2 NECK PAIN: ICD-10-CM

## 2023-02-28 PROCEDURE — 97110 THERAPEUTIC EXERCISES: CPT

## 2023-03-06 ENCOUNTER — HOSPITAL ENCOUNTER (OUTPATIENT)
Dept: PHYSICAL THERAPY | Facility: HOSPITAL | Age: 48
Setting detail: THERAPIES SERIES
Discharge: HOME | End: 2023-03-06
Payer: OTHER MISCELLANEOUS

## 2023-03-06 DIAGNOSIS — M25.511 RIGHT SHOULDER PAIN, UNSPECIFIED CHRONICITY: ICD-10-CM

## 2023-03-06 DIAGNOSIS — M54.2 NECK PAIN: ICD-10-CM

## 2023-03-06 DIAGNOSIS — M54.41 RIGHT-SIDED LOW BACK PAIN WITH RIGHT-SIDED SCIATICA, UNSPECIFIED CHRONICITY: Primary | ICD-10-CM

## 2023-03-06 PROCEDURE — 97110 THERAPEUTIC EXERCISES: CPT | Performed by: PHYSICAL THERAPIST
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
## 2023-03-08 ENCOUNTER — TELEPHONE (OUTPATIENT)
Dept: ORTHOPEDIC SURGERY | Facility: CLINIC | Age: 48
End: 2023-03-08
Payer: COMMERCIAL

## 2023-03-08 ENCOUNTER — HOSPITAL ENCOUNTER (OUTPATIENT)
Dept: PHYSICAL THERAPY | Facility: HOSPITAL | Age: 48
Setting detail: THERAPIES SERIES
Discharge: HOME | End: 2023-03-08
Payer: OTHER MISCELLANEOUS

## 2023-03-08 DIAGNOSIS — M54.41 RIGHT-SIDED LOW BACK PAIN WITH RIGHT-SIDED SCIATICA, UNSPECIFIED CHRONICITY: Primary | ICD-10-CM

## 2023-03-08 DIAGNOSIS — M54.2 NECK PAIN: ICD-10-CM

## 2023-03-08 DIAGNOSIS — M25.511 RIGHT SHOULDER PAIN, UNSPECIFIED CHRONICITY: ICD-10-CM

## 2023-03-08 DIAGNOSIS — S46.011D TRAUMATIC TEAR OF RIGHT ROTATOR CUFF, UNSPECIFIED TEAR EXTENT, SUBSEQUENT ENCOUNTER: ICD-10-CM

## 2023-03-08 DIAGNOSIS — M54.2 PAIN, NECK: ICD-10-CM

## 2023-03-08 PROCEDURE — 97110 THERAPEUTIC EXERCISES: CPT

## 2023-03-08 NOTE — THERAPY TREATMENT NOTE
Outpatient Physical Therapy Ortho Treatment Note  [facility]     Patient Name: Sadi Graves  : 1975  MRN: [id number]  Today's Date: 3/8/2023      Visit Date: 2023    Visit Dx:    ICD-10-CM ICD-9-CM   1. Right-sided low back pain with right-sided sciatica, unspecified chronicity  M54.41 724.3   2. Right shoulder pain, unspecified chronicity  M25.511 719.41   3. Neck pain  M54.2 723.1   4. Pain, neck  M54.2 723.1       Patient Active Problem List   Diagnosis   • Acute pyelonephritis   • VANESSA (acute kidney injury) ([facility])   • Leucocytosis   • Dehydration   • Nausea vomiting and diarrhea   • Acute abdominal pain   • Stage 3a chronic kidney disease (HCC)   • Hypertension   • Diastolic dysfunction without heart failure   • Acute gastritis without bleeding   • Acute duodenitis   • Non-cardiac chest pain   • Cervicogenic headache   • Vasovagal syncope   • Encounter for screening for malignant neoplasm of colon   • Lumbar disc herniation with radiculopathy        Past Medical History:   Diagnosis Date   • Acute nephritis    • Acute pyelonephritis    • VANESSA (acute kidney injury) ([facility])    • Carpal tunnel syndrome    • Cluster headache    • Epigastric pain     epigastric/chest pain   • Headache    • Headache, tension-type    • Hyperlipidemia    • Hypertension    • Migraine    • Palpitations    • Syncope         Past Surgical History:   Procedure Laterality Date   • BUNIONECTOMY     • COLONOSCOPY N/A 2022    Procedure: COLONOSCOPY TO CECUM AND TERMINAL ILEUM;  Surgeon: Tono Romero MD;  Location: [facility] ENDOSCOPY;  Service: Gastroenterology;  Laterality: N/A;  PRE- SCREENING  POST- HEMORRHOIDS   • ENDOSCOPY N/A 2020    Procedure: ESOPHAGOGASTRODUODENOSCOPY with biopsies;  Surgeon: Tono Romero MD;  Location: [facility] ENDOSCOPY;  Service: Gastroenterology;  Laterality: N/A;  Pre: epigastric pain  Post: duodenitis, gastritis, hiatal hernia   • FOOT SURGERY     • HERNIA REPAIR                           PT Assessment/Plan     Row Name 03/08/23 0800          PT Assessment    Assessment Comments Mr. Reyes arrives to appointment reporting increased shoulder soreness following injection for imaging yesterday, will get results Monday at MD appointment. Progressed shoulder stability with closed chain challenges (ball circles at wall and shoulder taps) as well as overhead stability with squat + OH shoulder flexion. He tolerated increased focus on shoulder well without complaint, updated HEP. He remains a good candidate for skilled PT.  -        PT Plan    PT Plan Comments consider cat camel, shoulder taps in qped, alt UE flex in qped?  -           User Key  (r) = Recorded By, (t) = Taken By, (c) = Cosigned By    Initials Name Provider Type    Halle Reid, PT Physical Therapist                   OP Exercises     Row Name 03/08/23 0800             Subjective Comments    Subjective Comments My shoulder is a little sore because I had to have a procedure done where they put the dye in it but I see Dr. Norris Monday and we will go over results.  -         Total Minutes    27634 - PT Therapeutic Exercise Minutes 39  -MH         Exercise 1    Exercise Name 1 Nustep  -      Cueing 1 Verbal;Demo  -MH      Time 1 5 min  -MH      Additional Comments UE/LE  -MH         Exercise 2    Exercise Name 2 LTR  -MH      Cueing 2 Verbal;Demo  -MH      Reps 2 10ea  -MH         Exercise 3    Exercise Name 3 piriformis/fig 4 stretch  -      Cueing 3 Verbal;Demo  -MH      Reps 3 3e  -MH      Time 3 20sec  -MH         Exercise 5    Exercise Name 5 S/L thoracolumbar rotation  -MH      Cueing 5 Verbal;Demo  -MH      Reps 5 10e  -MH      Time 5 5sec  -MH         Exercise 8    Exercise Name 8 bridge w/ OH DB hold  -MH      Cueing 8 Verbal;Demo  -MH      Sets 8 2  -MH      Reps 8 10  -MH      Time 8 5# DB  -MH         Exercise 13    Exercise Name 13 sidelying ER  -MH      Cueing 13 Verbal;Demo  -      Sets 13 2   -      Reps 13 10  -MH      Time 13 1#  -         Exercise 14    Exercise Name 14 squat (to mat) +OH shoulder flex  -      Cueing 14 Verbal;Demo  -      Sets 14 2  -MH      Reps 14 10  -MH      Time 14 YTB slight tension overhead  -      Additional Comments tap to low blue mat with pillow  -         Exercise 15    Exercise Name 15 ball circles at wall  -      Cueing 15 Verbal;Demo  -      Sets 15 4  -MH      Reps 15 10ea  -MH      Time 15 CW/CCW  -      Additional Comments L1  -         Exercise 16    Exercise Name 16 shoulder taps  -      Cueing 16 Verbal;Demo  -MH      Reps 16 20ea  -MH      Time 16 at wall  -            User Key  (r) = Recorded By, (t) = Taken By, (c) = Cosigned By    Initials Name Provider Type    Halle Reid, PT Physical Therapist                              PT OP Goals     Row Name 03/08/23 0800          PT Short Term Goals    STG Date to Achieve 03/09/23  -     STG 1 Pt will be independent and compliant in initial HEP.  -     STG 1 Progress Met  -     STG 2 Pt will demonstrate sit<->stand with proper core stabilization, minimal UE use.  -     STG 2 Progress Met  -     STG 3 Pt will demonstrate proper postural awareness with sitting, standing activities.  -     STG 3 Progress Ongoing;Progressing  -        Long Term Goals    LTG Date to Achieve 05/08/23  -     LTG 1 Pt will be independent in comprehensive HEP to allow ongoing self-management of condition.  -     LTG 1 Progress Ongoing  -     LTG 2 Pt will normalize ambulation, tolerating walking >/=20 min without increased symptoms for improved tolerance to functional and work related activities.  -     LTG 2 Progress Ongoing  -     LTG 3 Pt will improve lumbar spine AROM to 75% or greater for improved functional mobility.  -     LTG 3 Progress Met  -     LTG 4 Pt will demonstrate hip/LE strength to 4+/5 or greater for improved mechanics during functional, work related activities.   -     LTG 4 Progress Ongoing;Progressing  -     LTG 5 Pt will be independent in postural awareness, proper body mechanics, including bending, lifting to reduce risk for reinjury during work, functional activities.  -     LTG 5 Progress Ongoing  -     LTG 6 pt to demonstrate R  strength (average of 3) >/= 45# to faciliate ease with performing daily activities  -     LTG 6 Progress Ongoing;Progressing  -     LTG 7 pt to demosntrate R shoulder AROM flexion/abd >/= 155 to facilitate ease with performing dressing/household activities  -     LTG 7 Progress Ongoing;Progressing  -     LTG 8 pt to demonstrate R shoulder AROM FIR >/= midline T12 to facilitate ease with dressing  -     LTG 8 Progress Ongoing;Progressing  -           User Key  (r) = Recorded By, (t) = Taken By, (c) = Cosigned By    Initials Name Provider Type    Halle Reid PT Physical Therapist                Therapy Education  Education Details: Updated HEP Access Code: [email or web address]  Given: HEP, Symptoms/condition management  Program: Reinforced, Progressed  How Provided: Verbal, Demonstration, Written  Provided to: Patient  Level of Understanding: Verbalized, Demonstrated, Teach back education performed              Time Calculation:   Start Time: 0834  Stop Time: 0913  Time Calculation (min): 39 min  Total Timed Code Minutes- PT: 39 minute(s)  Timed Charges  58250 - PT Therapeutic Exercise Minutes: 39  Total Minutes  Timed Charges Total Minutes: 39   Total Minutes: 39  Therapy Charges for Today     Code Description Service Date Service Provider Modifiers Qty    02950536716 HC PT THER PROC EA 15 MIN 3/8/2023 Halle Baires, PT GP 3                    Halle Baires PT  3/8/2023

## 2023-03-09 ENCOUNTER — TELEPHONE (OUTPATIENT)
Dept: ORTHOPEDIC SURGERY | Facility: CLINIC | Age: 48
End: 2023-03-09
Payer: COMMERCIAL

## 2023-03-09 DIAGNOSIS — S46.011D TRAUMATIC TEAR OF RIGHT ROTATOR CUFF, UNSPECIFIED TEAR EXTENT, SUBSEQUENT ENCOUNTER: ICD-10-CM

## 2023-03-10 ENCOUNTER — TELEPHONE (OUTPATIENT)
Dept: ORTHOPEDIC SURGERY | Facility: CLINIC | Age: 48
End: 2023-03-10
Payer: COMMERCIAL

## 2023-03-10 NOTE — PROGRESS NOTES
Patient: Sadi Reyes  YOB: 1975  Date of Service: 3/10/2023    Chief Complaints: Right shoulder pain    Subjective:    History of Present Illness: Pt is seen in the office today with complaints of right shoulder pain is here for follow-up of his CT arthrogram.  This is normal other than some acromioclavicular arthritis he is progressing with physical therapy is getting some better but still having some pain        Allergies:   Allergies   Allergen Reactions   • Inderal La [Propranolol] Diarrhea and Nausea And Vomiting       Medications:   Home Medications:  Current Outpatient Medications on File Prior to Visit   Medication Sig   • amLODIPine (NORVASC) 10 MG tablet Take 1 tablet by mouth Daily.   • atorvastatin (LIPITOR) 20 MG tablet TAKE 1 TABLET BY MOUTH EVERY DAY   • escitalopram (Lexapro) 10 MG tablet Take 1 tablet by mouth Daily.   • esomeprazole (nexIUM) 40 MG capsule Take 1 capsule by mouth 2 (Two) Times a Day Before Meals.     No current facility-administered medications on file prior to visit.     Current Medications:  Scheduled Meds:  Continuous Infusions:No current facility-administered medications for this visit.    PRN Meds:.    I have reviewed the patient's medical history in detail and updated the computerized patient record.  Review and summarization of old records include:    Past Medical History:   Diagnosis Date   • Acute nephritis    • Acute pyelonephritis    • VANESSA (acute kidney injury) (HCC)    • Carpal tunnel syndrome    • Cluster headache    • Epigastric pain     epigastric/chest pain   • Headache    • Headache, tension-type    • Hyperlipidemia    • Hypertension    • Migraine    • Palpitations    • Syncope         Past Surgical History:   Procedure Laterality Date   • BUNIONECTOMY     • COLONOSCOPY N/A 11/17/2022    Procedure: COLONOSCOPY TO CECUM AND TERMINAL ILEUM;  Surgeon: Tono Romero MD;  Location: [facility] ENDOSCOPY;  Service: Gastroenterology;  Laterality: N/A;   PRE- SCREENING  POST- HEMORRHOIDS   • ENDOSCOPY N/A 11/02/2020    Procedure: ESOPHAGOGASTRODUODENOSCOPY with biopsies;  Surgeon: Tono Romero MD;  Location: [facility] ENDOSCOPY;  Service: Gastroenterology;  Laterality: N/A;  Pre: epigastric pain  Post: duodenitis, gastritis, hiatal hernia   • FOOT SURGERY     • HERNIA REPAIR          Social History     Occupational History   • Not on file   Tobacco Use   • Smoking status: Never   • Smokeless tobacco: Never   • Tobacco comments:     rare caffeine   Vaping Use   • Vaping Use: Never used   Substance and Sexual Activity   • Alcohol use: Yes     Comment: Twice a month   • Drug use: Yes     Frequency: 1.0 times per week     Types: Marijuana     Comment: uses daily   • Sexual activity: Yes     Partners: Female      Social History     Social History Narrative   • Not on file        Family History   Problem Relation Age of Onset   • Hypertension Mother    • No Known Problems Father    • No Known Problems Sister    • Prostate cancer Maternal Grandfather        ROS: 14 point review of systems was performed and was negative except for documented findings in HPI and today's encounter.     Allergies:   Allergies   Allergen Reactions   • Inderal La [Propranolol] Diarrhea and Nausea And Vomiting     Constitutional:  Denies fever, shaking or chills   Eyes:  Denies change in visual acuity   HENT:  Denies nasal congestion or sore throat   Respiratory:  Denies cough or shortness of breath   Cardiovascular:  Denies chest pain or severe LE edema   GI:  Denies abdominal pain, nausea, vomiting, bloody stools or diarrhea   Musculoskeletal:  Numbness, tingling, or loss of motor function only as noted above in history of present illness.  : Denies painful urination or hematuria  Integument:  Denies rash, lesion or ulceration   Neurologic:  Denies headache or focal weakness  Endocrine:  Denies lymphadenopathy  Psych:  Denies confusion or change in mental status   Hem:  Denies active  bleeding      Physical Exam: 47 y.o. male  Wt Readings from Last 3 Encounters:   02/14/23 84.5 kg (186 lb 3.2 oz)   02/13/23 83.2 kg (183 lb 6.4 oz)   02/06/23 81.9 kg (180 lb 9.6 oz)       There is no height or weight on file to calculate BMI.    There were no vitals filed for this visit.  Vital signs reviewed.   General Appearance:    Alert, cooperative, in no acute distress                    Ortho exam      Physical exam of the right shoulder reveals no overlying skin changes no lymphedema no lymphadenopathy.  Patient has active flexion 180 with mild symptoms abduction is similar external rotation is to 50 and internal rotation to the upper lumbar spine with mild symptoms.  Patient has good rotator cuff strength 4+ over 5 with isometric strength testing with pain.  Patient has a positive impingement and a positive Pack sign.  Patient has good cervical range of motion which is full and asymptomatic no radicular symptoms.  Patient has a normal elbow exam.  Good distal pulses are present  Patient has pain with overhead activity and a positive Neer sign and a positive empty can sign , a positive drop arm and a definitive painful arc         Assessment:   Right shoulder pain a CT arthrogram look good.  Admitting only, that is not the best test but I think it is good that that is normal.  We have not yet injected him and I think that would be a reasonable thing to do and then have him continue with his physical therapy.  I think from a shoulder standpoint he can work 1 arm work only we will say that I think he is currently still off for his back should he fail to get adequate improvement from this might consider arthroscopy  Plan: Is as above  Follow up as indicated.  Ice, elevate, and rest as needed.  Discussed conservative measures of pain control including ice, bracing.  Also talked about the importance of strengthening  Gardenia Norris M.D.

## 2023-03-10 NOTE — TELEPHONE ENCOUNTER
----- Message from Gardenia Norris MD sent at 3/10/2023 10:32 AM EST -----  I think so if they are still hurting I think it is reasonable to discuss what our next steps would be

## 2023-03-13 ENCOUNTER — HOSPITAL ENCOUNTER (OUTPATIENT)
Dept: PHYSICAL THERAPY | Facility: HOSPITAL | Age: 48
Setting detail: THERAPIES SERIES
Discharge: HOME | End: 2023-03-13
Payer: OTHER MISCELLANEOUS

## 2023-03-13 ENCOUNTER — OFFICE VISIT (OUTPATIENT)
Dept: ORTHOPEDIC SURGERY | Facility: CLINIC | Age: 48
End: 2023-03-13
Payer: OTHER MISCELLANEOUS

## 2023-03-13 VITALS — HEIGHT: 71 IN | TEMPERATURE: 97.6 F | BODY MASS INDEX: 26.23 KG/M2 | WEIGHT: 187.4 LBS

## 2023-03-13 DIAGNOSIS — M54.2 NECK PAIN: ICD-10-CM

## 2023-03-13 DIAGNOSIS — M25.511 RIGHT SHOULDER PAIN, UNSPECIFIED CHRONICITY: ICD-10-CM

## 2023-03-13 DIAGNOSIS — M54.2 PAIN, NECK: ICD-10-CM

## 2023-03-13 DIAGNOSIS — M54.41 RIGHT-SIDED LOW BACK PAIN WITH RIGHT-SIDED SCIATICA, UNSPECIFIED CHRONICITY: Primary | ICD-10-CM

## 2023-03-13 DIAGNOSIS — M75.41 IMPINGEMENT SYNDROME OF RIGHT SHOULDER: Primary | ICD-10-CM

## 2023-03-13 PROCEDURE — 97110 THERAPEUTIC EXERCISES: CPT | Performed by: PHYSICAL THERAPIST

## 2023-03-13 PROCEDURE — 99213 OFFICE O/P EST LOW 20 MIN: CPT | Performed by: ORTHOPAEDIC SURGERY

## 2023-03-13 NOTE — THERAPY TREATMENT NOTE
Outpatient Physical Therapy Ortho Treatment Note  [facility]     Patient Name: Sadi Graves  : 1975  MRN: [id number]  Today's Date: 3/13/2023      Visit Date: 2023    Visit Dx:    ICD-10-CM ICD-9-CM   1. Right-sided low back pain with right-sided sciatica, unspecified chronicity  M54.41 724.3   2. Right shoulder pain, unspecified chronicity  M25.511 719.41   3. Neck pain  M54.2 723.1   4. Pain, neck  M54.2 723.1       Patient Active Problem List   Diagnosis   • Acute pyelonephritis   • VANESSA (acute kidney injury) ([facility])   • Leucocytosis   • Dehydration   • Nausea vomiting and diarrhea   • Acute abdominal pain   • Stage 3a chronic kidney disease (HCC)   • Hypertension   • Diastolic dysfunction without heart failure   • Acute gastritis without bleeding   • Acute duodenitis   • Non-cardiac chest pain   • Cervicogenic headache   • Vasovagal syncope   • Encounter for screening for malignant neoplasm of colon   • Lumbar disc herniation with radiculopathy        Past Medical History:   Diagnosis Date   • Acute nephritis    • Acute pyelonephritis    • VANESSA (acute kidney injury) ([facility])    • Carpal tunnel syndrome    • Cluster headache    • Epigastric pain     epigastric/chest pain   • Headache    • Headache, tension-type    • Hyperlipidemia    • Hypertension    • Migraine    • Palpitations    • Syncope         Past Surgical History:   Procedure Laterality Date   • BUNIONECTOMY     • COLONOSCOPY N/A 2022    Procedure: COLONOSCOPY TO CECUM AND TERMINAL ILEUM;  Surgeon: Tono Romero MD;  Location: [facility] ENDOSCOPY;  Service: Gastroenterology;  Laterality: N/A;  PRE- SCREENING  POST- HEMORRHOIDS   • ENDOSCOPY N/A 2020    Procedure: ESOPHAGOGASTRODUODENOSCOPY with biopsies;  Surgeon: Tono Romero MD;  Location: [facility] ENDOSCOPY;  Service: Gastroenterology;  Laterality: N/A;  Pre: epigastric pain  Post: duodenitis, gastritis, hiatal hernia   • FOOT SURGERY     • HERNIA REPAIR          PT Ortho      Row Name 03/13/23 0830       Subjective Comments    Subjective Comments R biceps and shoulder pain is primary compliant- increases with lifting, use of R arm. Neck & low back pain improved.  -JS       Subjective Pain    Able to rate subjective pain? yes  -JS    Pre-Treatment Pain Level 3  -JS    Subjective Pain Comment 3/10 R shoulder, elbow; neck; low back  -JS       Right Upper Ext    Rt Shoulder Abduction AROM 125 with pain  -JS    Rt Shoulder Flexion AROM 140 with pain  -JS    Rt Shoulder External Rotation AROM MIREYA to post head with shoulder/biceps pain  -JS    Rt Shoulder Internal Rotation AROM L5 with effort/ biceps &shoulder pain  -JS          User Key  (r) = Recorded By, (t) = Taken By, (c) = Cosigned By    Initials Name Provider Type    Clemencia Newman, PT Physical Therapist                             PT Assessment/Plan     Row Name 03/13/23 0830          PT Assessment    Assessment Comments Pt presents with primary c/o R shoulder & elbow pain that occurs during lifting, reaching. Also describes neck & LBP that is improved but persistent. Reports compliance with HEP. Progression to further strengthening/stabilization exercises without further exacerbation of symptoms. STG's met. LTG's 1/8 met with all others progressing.  -JS        PT Plan    PT Plan Comments Pt to follow up with Dr. Norris today regarding recent test results. Continue PT, consider sidestepping, ball rolling up wall for ROM.  -JS           User Key  (r) = Recorded By, (t) = Taken By, (c) = Cosigned By    Initials Name Provider Type    Clemencia Newman, PT Physical Therapist                   OP Exercises     Row Name 03/13/23 0830 03/13/23 0800          Subjective Comments    Subjective Comments R biceps and shoulder pain is primary compliant- increases with lifting, use of R arm. Neck & low back pain improved.  -JS --        Subjective Pain    Able to rate subjective pain? yes  -JS --     Pre-Treatment Pain Level 3  -JS --      Subjective Pain Comment 3/10 R shoulder, elbow; neck; low back  -JS --        Total Minutes    37973 - PT Therapeutic Exercise Minutes 40  -JS --        Exercise 1    Exercise Name 1 Nustep  -JS --     Cueing 1 Verbal;Demo  -JS --     Time 1 5 min  -JS --     Additional Comments UE/LE  -JS --        Exercise 2    Exercise Name 2 LTR  -JS --     Cueing 2 Verbal;Demo  -JS --     Reps 2 10ea  -JS --     Time 2 5sec  -JS --        Exercise 3    Exercise Name 3 piriformis/fig 4 stretch  -JS --     Cueing 3 Verbal;Demo  -JS --     Reps 3 3e  -JS --     Time 3 20sec  -JS --        Exercise 5    Exercise Name 5 S/L thoracolumbar rotation  -JS --     Cueing 5 Verbal;Demo  -JS --     Reps 5 10e  -JS --     Time 5 5sec  -JS --        Exercise 8    Exercise Name 8 bridge w/ OH DB hold  -JS --     Cueing 8 Verbal;Demo  -JS --     Sets 8 2  -JS --     Reps 8 10  -JS --     Time 8 5# DB  -JS --        Exercise 13    Exercise Name 13 sidelying ER  -JS --     Cueing 13 Verbal;Demo  -JS --     Sets 13 2  -JS --     Reps 13 10  -JS --     Time 13 1#  -JS --        Exercise 14    Exercise Name 14 squat (to mat) +OH shoulder flex  -JS --  -JS     Cueing 14 Verbal;Demo  -JS --  -JS     Sets 14 2  -JS --  -JS     Reps 14 10  -JS --  -JS     Time 14 YTB slight tension overhead  -JS --  -JS     Additional Comments tap to low blue mat  -JS --  -JS        Exercise 15    Exercise Name 15 ball circles at wall  -JS --  -JS     Cueing 15 Verbal;Demo  -JS --  -JS     Sets 15 4  -JS --  -JS     Reps 15 10ea  -JS --  -JS     Time 15 CW/CCW  -JS --  -JS     Additional Comments L1  -JS --  -JS        Exercise 16    Exercise Name 16 shoulder taps  -JS --  -JS     Cueing 16 Verbal;Demo  -JS --  -JS     Reps 16 20ea  -JS --  -JS     Time 16 at bannister  -JS --  -JS        Exercise 17    Exercise Name 17 quadruped- alt UE raises  -JS --     Cueing 17 Verbal;Demo  -JS --     Reps 17 10 each  -JS --        Exercise 18    Exercise Name 18 quadruped  cat/camel  -JS --     Cueing 18 Verbal;Demo  -JS --     Reps 18 10  -JS --           User Key  (r) = Recorded By, (t) = Taken By, (c) = Cosigned By    Initials Name Provider Type    Clemencia Newman, PT Physical Therapist                              PT OP Goals     Row Name 03/13/23 0830          PT Short Term Goals    STG Date to Achieve 03/09/23  -JS     STG 1 Pt will be independent and compliant in initial HEP.  -JS     STG 1 Progress Met  -JS     STG 2 Pt will demonstrate sit<->stand with proper core stabilization, minimal UE use.  -JS     STG 2 Progress Met  -JS     STG 3 Pt will demonstrate proper postural awareness with sitting, standing activities.  -JS     STG 3 Progress Ongoing;Progressing  -JS        Long Term Goals    LTG Date to Achieve 05/08/23  -JS     LTG 1 Pt will be independent in comprehensive HEP to allow ongoing self-management of condition.  -JS     LTG 1 Progress Ongoing  -JS     LTG 2 Pt will normalize ambulation, tolerating walking >/=20 min without increased symptoms for improved tolerance to functional and work related activities.  -JS     LTG 2 Progress Ongoing;Progressing  -JS     LTG 2 Progress Comments Amb around 20 min one time, ongoing for consistent ability to tolerate >/=20 min  -JS     LTG 3 Pt will improve lumbar spine AROM to 75% or greater for improved functional mobility.  -JS     LTG 3 Progress Met  -JS     LTG 4 Pt will demonstrate hip/LE strength to 4+/5 or greater for improved mechanics during functional, work related activities.  -JS     LTG 4 Progress Ongoing;Progressing  -JS     LTG 5 Pt will be independent in postural awareness, proper body mechanics, including bending, lifting to reduce risk for reinjury during work, functional activities.  -JS     LTG 5 Progress Ongoing  -JS     LTG 6 pt to demonstrate R  strength (average of 3) >/= 45# to faciliate ease with performing daily activities  -JS     LTG 6 Progress Ongoing;Progressing  -JS     LTG 7 pt to demosntrate R  shoulder AROM flexion/abd >/= 155 to facilitate ease with performing dressing/household activities  -     LTG 7 Progress Ongoing;Progressing  -     LTG 8 pt to demonstrate R shoulder AROM FIR >/= midline T12 to facilitate ease with dressing  -     LTG 8 Progress Ongoing;Progressing  -           User Key  (r) = Recorded By, (t) = Taken By, (c) = Cosigned By    Initials Name Provider Type    Clemencia Newman, PT Physical Therapist                Therapy Education  Education Details: Reviewed HEP. Added quadruped UE raises, cat camel.              Time Calculation:   Start Time: 0835  Stop Time: 0915  Time Calculation (min): 40 min  Timed Charges  82633 - PT Therapeutic Exercise Minutes: 40  Total Minutes  Timed Charges Total Minutes: 40   Total Minutes: 40  Therapy Charges for Today     Code Description Service Date Service Provider Modifiers Qty    32386519474 HC PT THER PROC EA 15 MIN 3/13/2023 Clemencia Jerry, PT GP 3                    Clemencia Jerry PT  3/13/2023

## 2023-03-14 ENCOUNTER — TELEPHONE (OUTPATIENT)
Dept: ORTHOPEDIC SURGERY | Facility: CLINIC | Age: 48
End: 2023-03-14
Payer: COMMERCIAL

## 2023-03-14 NOTE — TELEPHONE ENCOUNTER
----- Message from Laura Hsu sent at 3/14/2023  9:39 AM EDT -----  Regarding: FW: Short term disability   Contact: [phone]    ----- Message -----  From: Sadi Reyes  Sent: 3/14/2023   9:36 AM EDT  To: [facility]  Subject: Short term disability                            Good morning, they said I need to schedule an appointment and get a new work note stating I’m off til 5/8/2023.

## 2023-03-17 ENCOUNTER — OFFICE VISIT (OUTPATIENT)
Dept: ORTHOPEDIC SURGERY | Facility: CLINIC | Age: 48
End: 2023-03-17
Payer: COMMERCIAL

## 2023-03-17 VITALS — BODY MASS INDEX: 25.9 KG/M2 | WEIGHT: 185 LBS | TEMPERATURE: 97.3 F | HEIGHT: 71 IN

## 2023-03-17 DIAGNOSIS — M54.2 NECK PAIN: ICD-10-CM

## 2023-03-17 DIAGNOSIS — M54.16 LUMBAR RADICULOPATHY: Primary | ICD-10-CM

## 2023-03-17 PROCEDURE — 99213 OFFICE O/P EST LOW 20 MIN: CPT | Performed by: NURSE PRACTITIONER

## 2023-03-17 NOTE — PROGRESS NOTES
Patient Name: Sadi Reyes   YOB: 1975  Referring Primary Care Physician: Alek Estrada MD      Chief Complaint:    Chief Complaint   Patient presents with   • Lumbar Spine - Follow-up, Pain        HPI:  Sadi Reyes is a 47 y.o. male who presents to [facility] for follow-up.  He is completing physical therapy.  He has his lumbar epidural scheduled the week after next.    PFSH:  See attached    ROS: As per HPI, otherwise negative    Objective:    Vitals:    03/17/23 0940   Temp: 97.3 °F (36.3 °C)     Body mass index is 25.8 kg/m².            IMAGING:     No new imaging today    Assessment:           Diagnoses and all orders for this visit:    1. Lumbar radiculopathy (Primary)    2. Neck pain             Plan:  He is completing physical therapy, he does feel like he is getting good results.  Ultimately work comp is not covering the lumbar spine treatment.  Currently getting injections into the shoulder with Dr. Norris he does state the last injection was very beneficial.  We will keep him off of work for now while he completes therapy and hopefully to epidural injections.  He has follow-up appointment on 5/8/2023 so we will determine return to work at that point.  If he is not ready to return to work, may need lumbar MRI or referral to physical medicine and rehab.      Return for Next scheduled follow up.

## 2023-03-20 ENCOUNTER — HOSPITAL ENCOUNTER (OUTPATIENT)
Dept: PHYSICAL THERAPY | Facility: HOSPITAL | Age: 48
Setting detail: THERAPIES SERIES
Discharge: HOME | End: 2023-03-20
Payer: OTHER MISCELLANEOUS

## 2023-03-20 DIAGNOSIS — M54.41 RIGHT-SIDED LOW BACK PAIN WITH RIGHT-SIDED SCIATICA, UNSPECIFIED CHRONICITY: Primary | ICD-10-CM

## 2023-03-20 DIAGNOSIS — M54.2 NECK PAIN: ICD-10-CM

## 2023-03-20 DIAGNOSIS — M54.2 PAIN, NECK: ICD-10-CM

## 2023-03-20 DIAGNOSIS — M25.511 RIGHT SHOULDER PAIN, UNSPECIFIED CHRONICITY: ICD-10-CM

## 2023-03-20 PROCEDURE — 97110 THERAPEUTIC EXERCISES: CPT

## 2023-03-20 NOTE — THERAPY TREATMENT NOTE
Outpatient Physical Therapy Ortho Treatment Note  [facility]     Patient Name: Sadi Graves  : 1975  MRN: [id number]  Today's Date: 3/20/2023      Visit Date: 2023    Visit Dx:    ICD-10-CM ICD-9-CM   1. Right-sided low back pain with right-sided sciatica, unspecified chronicity  M54.41 724.3   2. Right shoulder pain, unspecified chronicity  M25.511 719.41   3. Neck pain  M54.2 723.1   4. Pain, neck  M54.2 723.1       Patient Active Problem List   Diagnosis   • Acute pyelonephritis   • VANESSA (acute kidney injury) ([facility])   • Leucocytosis   • Dehydration   • Nausea vomiting and diarrhea   • Acute abdominal pain   • Stage 3a chronic kidney disease (HCC)   • Hypertension   • Diastolic dysfunction without heart failure   • Acute gastritis without bleeding   • Acute duodenitis   • Non-cardiac chest pain   • Cervicogenic headache   • Vasovagal syncope   • Encounter for screening for malignant neoplasm of colon   • Lumbar disc herniation with radiculopathy        Past Medical History:   Diagnosis Date   • Acute nephritis    • Acute pyelonephritis    • VANESSA (acute kidney injury) ([facility])    • Carpal tunnel syndrome    • Cluster headache    • Epigastric pain     epigastric/chest pain   • Headache    • Headache, tension-type    • Hyperlipidemia    • Hypertension    • Migraine    • Palpitations    • Syncope         Past Surgical History:   Procedure Laterality Date   • BUNIONECTOMY     • COLONOSCOPY N/A 2022    Procedure: COLONOSCOPY TO CECUM AND TERMINAL ILEUM;  Surgeon: Tono Romero MD;  Location: [facility] ENDOSCOPY;  Service: Gastroenterology;  Laterality: N/A;  PRE- SCREENING  POST- HEMORRHOIDS   • ENDOSCOPY N/A 2020    Procedure: ESOPHAGOGASTRODUODENOSCOPY with biopsies;  Surgeon: Tono Romero MD;  Location: [facility] ENDOSCOPY;  Service: Gastroenterology;  Laterality: N/A;  Pre: epigastric pain  Post: duodenitis, gastritis, hiatal hernia   • FOOT SURGERY     • HERNIA REPAIR                           PT Assessment/Plan     Row Name 03/20/23 0800          PT Assessment    Assessment Comments Sadi returns to clinic after f/u with MD. CT arthrogram (-) for acute findings aside from some AC joint arthritis, he did receive cortisone injection in shoulder 3/13 but thus far has not noticed any significant improvement. Per MD note may consider need for arthroscopy in future if he fails to improve. In regards to the shoulder MD has cleared him to return to work in limited capacity only using L UE although he remains off work now on STD secondary to his back. He has also been seen for f/u on LBP, he is scheduled for lumbar ARABELLA next week. He has 2 remaining skilled PT sessions at 2x/week frequency and will then likely decrease to 1x/week in anticipation to return to work in limited capacity though pt. is hopefully to potentially be fully released following epidural for his back and f/u with Dr. Shipman for shoulder 4/10/2023. He reports this weekend he stood for 90 minutes and the next day was in significant pain, if he were to return to work he is uncertain the expectation he would have in regards to standing/walking. Conntinued to cahllenge both shoulder and lumbar stability, resumed shoulder ext + TrA, and increased weight on bridge + OH flexion and added resistance to alt UT flex in q-ped. He benefits from cueing to ensure TrA engagement throughout to improve lumbar support.  -        PT Plan    PT Plan Comments side step + press out?  -           User Key  (r) = Recorded By, (t) = Taken By, (c) = Cosigned By    Initials Name Provider Type     Halle Baires, PT Physical Therapist                   OP Exercises     Row Name 03/20/23 0800             Subjective Comments    Subjective Comments I got a shot in my shoulder which is why I canceled Wednesday because it was so sore.  -         Total Minutes    36651 - PT Therapeutic Exercise Minutes 40  -         Exercise 1    Exercise Name  1 Nustep  -MH      Cueing 1 Verbal;Demo  -MH      Time 1 5 min  -MH      Additional Comments UE/LE  -MH         Exercise 2    Exercise Name 2 LTR  -MH      Cueing 2 Verbal;Demo  -MH      Reps 2 10ea  -MH      Time 2 5sec  -MH         Exercise 3    Exercise Name 3 piriformis/fig 4 stretch  -MH      Cueing 3 Verbal;Demo  -MH      Reps 3 3e  -MH      Time 3 20sec  -MH         Exercise 5    Exercise Name 5 S/L thoracolumbar rotation  -MH      Cueing 5 Verbal;Demo  -MH      Reps 5 10e  -MH      Time 5 5sec  -MH         Exercise 8    Exercise Name 8 bridge w/ OH DB hold  -MH      Cueing 8 Verbal;Demo  -MH      Sets 8 2  -MH      Reps 8 10  -MH      Time 8 8# DB  -MH         Exercise 11    Exercise Name 11 90/90 sciatic n glide  -MH      Cueing 11 Verbal  -MH      Reps 11 20 ea leg  -MH         Exercise 12    Exercise Name 12 shoulder ext + TrA  -MH      Cueing 12 Verbal;Demo  -MH      Reps 12 2x10  -MH      Time 12 GTB  -MH         Exercise 16    Exercise Name 16 shoulder taps  -MH      Cueing 16 Verbal;Demo  -MH      Reps 16 20ea  -MH      Time 16 at wall  -MH         Exercise 17    Exercise Name 17 quadruped- alt UE raises  -MH      Cueing 17 Verbal;Demo  -MH      Reps 17 10 each  -MH      Time 17 YTB  -MH         Exercise 18    Exercise Name 18 quadruped cat/camel  -MH      Cueing 18 Verbal;Demo  -MH      Reps 18 10  -MH         Exercise 19    Exercise Name 19 shaorn pose  -MH      Cueing 19 Verbal;Demo  -MH      Reps 19 3  -MH      Time 19 20sec  -MH            User Key  (r) = Recorded By, (t) = Taken By, (c) = Cosigned By    Initials Name Provider Type    Halle Reid, PT Physical Therapist                              PT OP Goals     Row Name 03/20/23 0800          PT Short Term Goals    STG Date to Achieve 03/09/23  -MH     STG 1 Pt will be independent and compliant in initial HEP.  -MH     STG 1 Progress Met  -MH     STG 2 Pt will demonstrate sit<->stand with proper core stabilization, minimal UE use.  -MH      STG 2 Progress Met  -     STG 3 Pt will demonstrate proper postural awareness with sitting, standing activities.  -     STG 3 Progress Ongoing;Progressing  -        Long Term Goals    LTG Date to Achieve 05/08/23  -     LTG 1 Pt will be independent in comprehensive HEP to allow ongoing self-management of condition.  -     LTG 1 Progress Ongoing  -     LTG 2 Pt will normalize ambulation, tolerating walking >/=20 min without increased symptoms for improved tolerance to functional and work related activities.  -     LTG 2 Progress Ongoing;Progressing  -     LTG 3 Pt will improve lumbar spine AROM to 75% or greater for improved functional mobility.  -     LTG 3 Progress Met  -     LTG 4 Pt will demonstrate hip/LE strength to 4+/5 or greater for improved mechanics during functional, work related activities.  -     LTG 4 Progress Ongoing;Progressing  -     LTG 5 Pt will be independent in postural awareness, proper body mechanics, including bending, lifting to reduce risk for reinjury during work, functional activities.  -     LTG 5 Progress Ongoing  -     LTG 6 pt to demonstrate R  strength (average of 3) >/= 45# to faciliate ease with performing daily activities  -     LTG 6 Progress Ongoing;Progressing  -     LTG 7 pt to demosntrate R shoulder AROM flexion/abd >/= 155 to facilitate ease with performing dressing/household activities  -     LTG 7 Progress Ongoing;Progressing  -     LTG 8 pt to demonstrate R shoulder AROM FIR >/= midline T12 to facilitate ease with dressing  -     LTG 8 Progress Ongoing;Progressing  -           User Key  (r) = Recorded By, (t) = Taken By, (c) = Cosigned By    Initials Name Provider Type    Halle Ried, PT Physical Therapist                Therapy Education  Given: Symptoms/condition management  Program: Reinforced  How Provided: Verbal  Provided to: Patient  Level of Understanding: Verbalized              Time Calculation:   Start Time:  0834  Stop Time: 0914  Time Calculation (min): 40 min  Total Timed Code Minutes- PT: 40 minute(s)  Timed Charges  00691 - PT Therapeutic Exercise Minutes: 40  Total Minutes  Timed Charges Total Minutes: 40   Total Minutes: 40  Therapy Charges for Today     Code Description Service Date Service Provider Modifiers Qty    04334377699 HC PT THER PROC EA 15 MIN 3/20/2023 Halle Baires, PT GP 3                    Halle Baires, PT  3/20/2023

## 2023-03-29 ENCOUNTER — HOSPITAL ENCOUNTER (OUTPATIENT)
Dept: PHYSICAL THERAPY | Facility: HOSPITAL | Age: 48
Setting detail: THERAPIES SERIES
Discharge: HOME | End: 2023-03-29
Payer: OTHER MISCELLANEOUS

## 2023-03-29 DIAGNOSIS — M54.2 PAIN, NECK: ICD-10-CM

## 2023-03-29 DIAGNOSIS — M54.41 RIGHT-SIDED LOW BACK PAIN WITH RIGHT-SIDED SCIATICA, UNSPECIFIED CHRONICITY: Primary | ICD-10-CM

## 2023-03-29 DIAGNOSIS — M54.2 NECK PAIN: ICD-10-CM

## 2023-03-29 DIAGNOSIS — M25.511 RIGHT SHOULDER PAIN, UNSPECIFIED CHRONICITY: ICD-10-CM

## 2023-03-29 PROCEDURE — 97110 THERAPEUTIC EXERCISES: CPT | Performed by: PHYSICAL THERAPIST

## 2023-03-29 NOTE — THERAPY TREATMENT NOTE
Outpatient Physical Therapy Ortho Treatment Note  [facility]     Patient Name: Sadi Graves  : 1975  MRN: [id number]  Today's Date: 3/29/2023      Visit Date: 2023    Visit Dx:    ICD-10-CM ICD-9-CM   1. Right-sided low back pain with right-sided sciatica, unspecified chronicity  M54.41 724.3   2. Right shoulder pain, unspecified chronicity  M25.511 719.41   3. Neck pain  M54.2 723.1   4. Pain, neck  M54.2 723.1       Patient Active Problem List   Diagnosis   • Acute pyelonephritis   • VANESSA (acute kidney injury) ([facility])   • Leucocytosis   • Dehydration   • Nausea vomiting and diarrhea   • Acute abdominal pain   • Stage 3a chronic kidney disease (HCC)   • Hypertension   • Diastolic dysfunction without heart failure   • Acute gastritis without bleeding   • Acute duodenitis   • Non-cardiac chest pain   • Cervicogenic headache   • Vasovagal syncope   • Encounter for screening for malignant neoplasm of colon   • Lumbar disc herniation with radiculopathy        Past Medical History:   Diagnosis Date   • Acute nephritis    • Acute pyelonephritis    • VANESSA (acute kidney injury) ([facility])    • Carpal tunnel syndrome    • Cluster headache    • Epigastric pain     epigastric/chest pain   • Headache    • Headache, tension-type    • Hyperlipidemia    • Hypertension    • Migraine    • Palpitations    • Syncope         Past Surgical History:   Procedure Laterality Date   • BUNIONECTOMY     • COLONOSCOPY N/A 2022    Procedure: COLONOSCOPY TO CECUM AND TERMINAL ILEUM;  Surgeon: Tono Romero MD;  Location: [facility] ENDOSCOPY;  Service: Gastroenterology;  Laterality: N/A;  PRE- SCREENING  POST- HEMORRHOIDS   • ENDOSCOPY N/A 2020    Procedure: ESOPHAGOGASTRODUODENOSCOPY with biopsies;  Surgeon: Tono Romero MD;  Location: [facility] ENDOSCOPY;  Service: Gastroenterology;  Laterality: N/A;  Pre: epigastric pain  Post: duodenitis, gastritis, hiatal hernia   • FOOT SURGERY     • HERNIA REPAIR          PT Ortho      Row Name 03/29/23 0830       Myotomal Screen- Lower Quarter Clearing    Hip flexion (L2) Right:;4+ (Good +);Left:;5 (Normal)  -JS    Knee extension (L3) Bilateral:;5 (Normal)  -JS    Knee flexion (S2) Bilateral:;5 (Normal)  -JS       Right Upper Ext    Rt Shoulder Abduction AROM 140 with endrange pain  -JS    Rt Shoulder Flexion AROM 155  -JS    Rt Shoulder External Rotation AROM MIREYA T1 with mild pain  -JS    Rt Shoulder Internal Rotation AROM L3 level  -JS       MMT Right Upper Ext    Rt Shoulder Flexion MMT, Gross Movement (4+/5) good plus  -JS    Rt Shoulder ABduction MMT, Gross Movement (5/5) normal  -JS    Rt Shoulder Internal Rotation MMT, Gross Movement (5/5) normal  -JS    Rt Shoulder External Rotation MMT, Gross Movement (4+/5) good plus  -JS       MMT Left Upper Ext    Lt Shoulder Flexion MMT, Gross Movement (5/5) normal  -JS    Lt Shoulder ABduction MMT, Gross Movement (5/5) normal  -JS    Lt Shoulder Internal Rotation MMT, Gross Movement (5/5) normal  -JS    Lt Shoulder External Rotation MMT, Gross Movement (5/5) normal  -JS       MMT Right Lower Ext    Rt Hip Extension MMT, Gross Movement (4+/5) good plus  -JS    Rt Hip ABduction MMT, Gross Movement (4+/5) good plus  -JS       MMT Left Lower Ext    Lt Hip Extension MMT, Gross Movement (5/5) normal  -JS    Lt Hip ABduction MMT, Gross Movement (4+/5) good plus  -JS        Strength Right    # Reps 3  -JS    Right Rung 2  -JS    Right  Test 1 75  -JS    Right  Test 2 75  -JS    Right  Test 3 55  -JS     Strength Average Right 68.33  -JS          User Key  (r) = Recorded By, (t) = Taken By, (c) = Cosigned By    Initials Name Provider Type    JS Clemencia Jerry, PT Physical Therapist                             PT Assessment/Plan     Row Name 03/29/23 8859          PT Assessment    Assessment Comments Sadi Reyes has been seen for 9 physical therapy sessions for R sided LBP, neck pain, R shoulder pain.  Treatment has included  "therapeutic exercise, manual therapy, neuro-muscular retraining  and patient education with home exercise program . Progress to physical therapy goals is good with 2/3 STGs met and 3/8 LTGs met with remaining goals progressing .  Patient demonstrates improved lumbar AROM, increased R shoulder AROM and strength, and improving core/hip strength. Patient is progressing with exercises, with recent R shoulder injection & scheduled for lumbar epidural tomorrow. Patient remains off work at this time.  He will benefit from continued skilled physical therapy to address remaining impairments and functional limitations.  -JS     Please refer to paper survey for additional self-reported information Yes  -JS     Rehab Potential Good  -JS     Patient/caregiver participated in establishment of treatment plan and goals Yes  -JS     Patient would benefit from skilled therapy intervention Yes  -JS        PT Plan    PT Frequency 2x/week;1x/week  -JS     Predicted Duration of Therapy Intervention (PT) 4-6 visits  -JS     PT Plan Comments Pt scheduled for lumbar epidural tomorrow, then returns to MD next week to discuss return to work.  Will plan for ongoing PT, likely 1x/week, to progress toward remaining goals and prepare for return to work activities.  Consider monster walk, shoulder IR towel stretch, review body mechanics with bending/lifting.  -JS           User Key  (r) = Recorded By, (t) = Taken By, (c) = Cosigned By    Initials Name Provider Type    Clemencia Newman, PT Physical Therapist                   OP Exercises     Row Name 03/29/23 0820             Subjective Comments    Subjective Comments Scheduled for epidural for low back tomorrow. Has appt next week to determine return to work. Reports increased LBP past 2 days, unsure the cause. Shoulder & neck feeling \"alright\" without pain.  -JS         Subjective Pain    Able to rate subjective pain? yes  -JS      Pre-Treatment Pain Level 7  -JS      Subjective Pain Comment LBP  " -JS         Total Minutes    08060 - PT Therapeutic Exercise Minutes 50  -JS         Exercise 1    Exercise Name 1 Nustep  -JS      Cueing 1 Verbal;Demo  -JS      Time 1 5 min  -JS      Additional Comments UE/LE  -JS         Exercise 2    Exercise Name 2 LTR  -JS      Cueing 2 Verbal;Demo  -JS      Reps 2 10ea  -JS      Time 2 5sec  -JS         Exercise 3    Exercise Name 3 piriformis/fig 4 stretch  -JS      Cueing 3 Verbal;Demo  -JS      Reps 3 3e  -JS      Time 3 20sec  -JS         Exercise 5    Exercise Name 5 S/L thoracolumbar rotation  -JS      Cueing 5 Verbal;Demo  -JS      Reps 5 10e  -JS      Time 5 5sec  -JS      Additional Comments arm extended  -JS         Exercise 8    Exercise Name 8 bridge w/ OH DB hold  -JS      Cueing 8 Verbal;Demo  -JS      Sets 8 2  -JS      Reps 8 10  -JS      Time 8 8# DB  -JS      Additional Comments RTB at knees  -JS         Exercise 11    Exercise Name 11 90/90 sciatic n glide  -JS      Cueing 11 Verbal  -JS      Reps 11 20 ea leg  -JS         Exercise 12    Exercise Name 12 shoulder ext + TrA  -JS      Cueing 12 Verbal;Demo  -JS      Reps 12 2x10  -JS      Time 12 GTB  -JS         Exercise 13    Exercise Name 13 sidelying ER  -JS      Cueing 13 Verbal;Demo  -JS      Sets 13 2  -JS      Reps 13 10  -JS      Time 13 1#  -JS         Exercise 14    Exercise Name 14 squat (to mat) +OH shoulder flex  -JS      Cueing 14 Verbal;Demo  -JS      Sets 14 2  -JS      Reps 14 10  -JS      Time 14 YTB slight tension overhead  -JS      Additional Comments tap to low mat  -JS         Exercise 15    Exercise Name 15 AR press  -JS      Cueing 15 Verbal;Demo  -JS      Sets 15 2  -JS      Reps 15 10  -JS      Time 15 RTB  -JS         Exercise 16    Exercise Name 16 shoulder taps  -JS      Cueing 16 Verbal;Demo  -JS      Reps 16 20ea  -JS      Time 16 at barre  -JS         Exercise 17    Exercise Name 17 quadruped- alt UE raises  -JS      Cueing 17 Verbal;Demo  -JS      Reps 17 10 each  -JS       Time 17 YTB  -JS         Exercise 18    Exercise Name 18 quadruped cat/camel  -JS      Cueing 18 Verbal;Demo  -JS      Reps 18 10  -JS         Exercise 19    Exercise Name 19 sharon pose  -JS      Cueing 19 Verbal;Demo  -JS      Reps 19 3  -JS      Time 19 20sec  -JS         Exercise 20    Exercise Name 20 sidestepping  -JS      Cueing 20 Verbal;Demo  -JS      Reps 20 3 laps  -JS      Time 20 RTB at ankles  -JS            User Key  (r) = Recorded By, (t) = Taken By, (c) = Cosigned By    Initials Name Provider Type    Clemencia Newman, PT Physical Therapist                              PT OP Goals     Row Name 03/29/23 0830          PT Short Term Goals    STG Date to Achieve 03/09/23  -JS     STG 1 Pt will be independent and compliant in initial HEP.  -JS     STG 1 Progress Met  -JS     STG 2 Pt will demonstrate sit<->stand with proper core stabilization, minimal UE use.  -JS     STG 2 Progress Met  -JS     STG 3 Pt will demonstrate proper postural awareness with sitting, standing activities.  -JS     STG 3 Progress Ongoing;Progressing  -JS     STG 3 Progress Comments Reports improved sitting posture awareness in car, but needs further work at home.  -JS        Long Term Goals    LTG Date to Achieve 05/08/23  -JS     LTG 1 Pt will be independent in comprehensive HEP to allow ongoing self-management of condition.  -JS     LTG 1 Progress Ongoing  -JS     LTG 2 Pt will normalize ambulation, tolerating walking >/=20 min without increased symptoms for improved tolerance to functional and work related activities.  -JS     LTG 2 Progress Ongoing;Partially Met  -JS     LTG 2 Progress Comments Amb around 20 min, but increased symptoms past 2 days. Ongoing for consistent subjective report.  -JS     LTG 3 Pt will improve lumbar spine AROM to 75% or greater for improved functional mobility.  -JS     LTG 3 Progress Met  -JS     LTG 4 Pt will demonstrate hip/LE strength to 4+/5 or greater for improved mechanics during functional,  work related activities.  -     LTG 4 Progress Met  -     LTG 5 Pt will be independent in postural awareness, proper body mechanics, including bending, lifting to reduce risk for reinjury during work, functional activities.  -     LTG 5 Progress Ongoing  -     LTG 6 pt to demonstrate R  strength (average of 3) >/= 45# to faciliate ease with performing daily activities  -     LTG 6 Progress Met  -     LTG 6 Progress Comments 68.33# R   -     LTG 7 pt to demosntrate R shoulder AROM flexion/abd >/= 155 to facilitate ease with performing dressing/household activities  -     LTG 7 Progress Partially Met;Progressing  -JS     LTG 7 Progress Comments R shoulder flex 155, abd 140  -     LTG 8 pt to demonstrate R shoulder AROM FIR >/= midline T12 to facilitate ease with dressing  -     LTG 8 Progress Ongoing;Progressing  -     LTG 8 Progress Comments L3 level  -           User Key  (r) = Recorded By, (t) = Taken By, (c) = Cosigned By    Initials Name Provider Type    Clemencia Newman PT Physical Therapist                Therapy Education  Education Details: Reviewed HEP, adding AR press & sidestepping.    Outcome Measure Options: Modified Oswestry, Neck Disability Index (NDI), Quick DASH  Quick DASH  Open a tight or new jar.: Mild Difficulty  Do heavy household chores (e.g., wash walls, wash floors): Mild Difficulty  Carry a shopping bag or briefcase: Mild Difficulty  Wash your back: Mild Difficulty  Use a knife to cut food: No Difficulty  Recreational activities in which you take some force or impact through your arm, should or hand (e.g. golf, hammering, tennis, etc.): Moderate Difficulty  During the past week, to what extent has your arm, shoulder, or hand problem interfered with your normal social activites with family, friends, neighbors or groups?: Slightly  During the past week, were you limited in your work or other regular daily activities as a result of your arm, shoulder or hand  problem?: Slightly Limited  Arm, Shoulder, or hand pain: Mild  Tingling (pins and needles) in your arm, shoulder, or hand: None  During the past week, how much difficulty have you had sleeping because of the pain in your arm, shoulder or hand?: Mild Difficulty  Number of Questions Answered: 11  Quick DASH Score: 22.73  Modified Oswestry  Modified Oswestry Score/Comments: 21/50=42%  Neck Disability Index  Section 1 - Pain Intensity: The pain is mild at the moment.  Section 2 - Personal Care: I can look after myself normally without causing extra pain.  Section 3 - Lifting: Pain prevents me from lifting heavy weights off the floor but I can if they are conveniently positioned, for example on a table.  Section 4 - Work: I can do most of my usual work, but no more  Section 5 - Headaches: I have moderate headaches that come infrequently.  Section 6 - Concentration: I can concentrate fully when I want to with slight difficulty.  Section 7 - Sleeping: My sleep is mildly disturbed (1-2 hours sleepless).  Section 8 - Driving: I can drive as long as I want with moderate neck pain.  Section 9 - Reading: I can read as much as I want with moderate neck pain.  Section 10 - Recreation: I am able to engage in most but not all recreational activities because of pain in my neck.  Neck Disability Index Score: 16  Neck Disability Index Comments: 32%      Time Calculation:   Start Time: 0830  Stop Time: 0920  Time Calculation (min): 50 min  Timed Charges  70621 - PT Therapeutic Exercise Minutes: 50  Total Minutes  Timed Charges Total Minutes: 50   Total Minutes: 50  Therapy Charges for Today     Code Description Service Date Service Provider Modifiers Qty    [id number]  PT THER PROC EA 15 MIN 3/29/2023 Clemencia Jerry, PT GP 3          PT G-Codes  Outcome Measure Options: Modified Oswestry, Neck Disability Index (NDI), Quick DASH  Quick DASH Score: 22.73  Modified Oswestry Score/Comments: 21/50=42%  Neck Disability Index Score: 16          Clemencia Jerry, PT  3/29/2023

## 2023-03-30 ENCOUNTER — HOSPITAL ENCOUNTER (OUTPATIENT)
Dept: GENERAL RADIOLOGY | Facility: SURGERY CENTER | Age: 48
Setting detail: HOSPITAL OUTPATIENT SURGERY
End: 2023-03-30
Payer: COMMERCIAL

## 2023-03-30 ENCOUNTER — HOSPITAL ENCOUNTER (OUTPATIENT)
Facility: SURGERY CENTER | Age: 48
Setting detail: HOSPITAL OUTPATIENT SURGERY
Discharge: HOME | End: 2023-03-30
Attending: ANESTHESIOLOGY | Admitting: ANESTHESIOLOGY
Payer: COMMERCIAL

## 2023-03-30 VITALS
TEMPERATURE: 98 F | RESPIRATION RATE: 16 BRPM | HEIGHT: 71 IN | BODY MASS INDEX: 25.62 KG/M2 | OXYGEN SATURATION: 96 % | SYSTOLIC BLOOD PRESSURE: 128 MMHG | DIASTOLIC BLOOD PRESSURE: 94 MMHG | WEIGHT: 183 LBS | HEART RATE: 65 BPM

## 2023-03-30 DIAGNOSIS — M51.16 LUMBAR DISC HERNIATION WITH RADICULOPATHY: ICD-10-CM

## 2023-03-30 DIAGNOSIS — Z41.9 SURGERY, ELECTIVE: ICD-10-CM

## 2023-03-30 PROCEDURE — 76000 FLUOROSCOPY <1 HR PHYS/QHP: CPT

## 2023-03-30 PROCEDURE — 77002 NEEDLE LOCALIZATION BY XRAY: CPT

## 2023-03-30 PROCEDURE — 64483 NJX AA&/STRD TFRM EPI L/S 1: CPT | Performed by: ANESTHESIOLOGY

## 2023-03-30 PROCEDURE — 25510000001 IOPAMIDOL 61 % SOLUTION 30 ML VIAL: Performed by: ANESTHESIOLOGY

## 2023-03-30 PROCEDURE — S0260 H&P FOR SURGERY: HCPCS | Performed by: ANESTHESIOLOGY

## 2023-03-30 PROCEDURE — 25010000002 METHYLPREDNISOLONE PER 80 MG: Performed by: ANESTHESIOLOGY

## 2023-03-30 RX ORDER — DIAZEPAM 5 MG/1
10 TABLET ORAL ONCE
Status: DISCONTINUED | OUTPATIENT
Start: 2023-03-30 | End: 2023-03-30 | Stop reason: HOSPADM

## 2023-03-30 NOTE — H&P
Brief Pre-procedural / Pre-operative H&P        -----    Pertinent Diagnosis:   Right lumbar radiculopathy    Proposed Procedure: Lumbar transforaminal epidural steroid injection      Yue Reyes is a 47 y.o. male  who presents for intervention.  He has a history of back pain and right leg pain.      History of Present Illness     He has lumbar area and lumbosacral area pain on the right decreased range of motion of the low back is been demonstrated and back tenderness is not demonstrated and positive straight leg raising maneuvers maneuver started on the right.  He is engaged in some physical therapy.  He has right worried L5-S1 disc herniation contacting the right S1 nerve root.    -------    The following portions of the patient's history were reviewed and updated as appropriate: allergies, current medications, past family history, past medical history, past social history, past surgical history and problem list.    Allergies   Allergen Reactions   • Inderal La [Propranolol] Diarrhea and Nausea And Vomiting         Current Facility-Administered Medications:   •  diazePAM (VALIUM) tablet 10 mg, 10 mg, Oral, Once, Andre Wilson MD    No current facility-administered medications on file prior to encounter.     Current Outpatient Medications on File Prior to Encounter   Medication Sig Dispense Refill   • amLODIPine (NORVASC) 10 MG tablet Take 1 tablet by mouth Daily. 30 tablet 4   • atorvastatin (LIPITOR) 20 MG tablet TAKE 1 TABLET BY MOUTH EVERY DAY 90 tablet 0   • escitalopram (Lexapro) 10 MG tablet Take 1 tablet by mouth Daily. 90 tablet 1   • esomeprazole (nexIUM) 40 MG capsule Take 1 capsule by mouth 2 (Two) Times a Day Before Meals. 60 capsule 12       Patient Active Problem List   Diagnosis   • Acute pyelonephritis   • VANESSA (acute kidney injury) (HCC)   • Leucocytosis   • Dehydration   • Nausea vomiting and diarrhea   • Acute abdominal pain   • Stage 3a chronic kidney disease (HCC)   •  Hypertension   • Diastolic dysfunction without heart failure   • Acute gastritis without bleeding   • Acute duodenitis   • Non-cardiac chest pain   • Cervicogenic headache   • Vasovagal syncope   • Encounter for screening for malignant neoplasm of colon   • Lumbar disc herniation with radiculopathy       Past Medical History:   Diagnosis Date   • Acute nephritis    • Acute pyelonephritis    • VANESSA (acute kidney injury) (HCC)    • Carpal tunnel syndrome    • Cluster headache    • Epigastric pain     epigastric/chest pain   • Headache    • Headache, tension-type    • Hyperlipidemia    • Hypertension    • Migraine    • Palpitations    • Syncope        Past Surgical History:   Procedure Laterality Date   • BUNIONECTOMY     • COLONOSCOPY N/A 11/17/2022    Procedure: COLONOSCOPY TO CECUM AND TERMINAL ILEUM;  Surgeon: Tono Romero MD;  Location: [facility] ENDOSCOPY;  Service: Gastroenterology;  Laterality: N/A;  PRE- SCREENING  POST- HEMORRHOIDS   • ENDOSCOPY N/A 11/02/2020    Procedure: ESOPHAGOGASTRODUODENOSCOPY with biopsies;  Surgeon: Tono Romero MD;  Location: [facility] ENDOSCOPY;  Service: Gastroenterology;  Laterality: N/A;  Pre: epigastric pain  Post: duodenitis, gastritis, hiatal hernia   • FOOT SURGERY     • HERNIA REPAIR         Family History   Problem Relation Age of Onset   • Hypertension Mother    • No Known Problems Father    • No Known Problems Sister    • Prostate cancer Maternal Grandfather        Social History     Socioeconomic History   • Marital status:    Tobacco Use   • Smoking status: Never   • Smokeless tobacco: Never   • Tobacco comments:     rare caffeine   Vaping Use   • Vaping Use: Never used   Substance and Sexual Activity   • Alcohol use: Yes     Comment: Twice a month   • Drug use: Yes     Frequency: 1.0 times per week     Types: Marijuana     Comment: uses daily   • Sexual activity: Yes     Partners: Female       -------       Review of Systems  All other systems reviewed and are  "negative.  No Fever, No Chills, No ear pain, No sinus pressure or drainage, No eye pain or drainage, No cough, No SOB, No chest tightness nor chest pain, no palpitations.      ---          Vitals:    03/30/23 0850   BP: 132/86   BP Location: Left arm   Patient Position: Lying   Pulse: 65   Resp: 16   Temp: 98.4 °F (36.9 °C)   TempSrc: Temporal   SpO2: 97%   Weight: 83 kg (183 lb)   Height: 180.3 cm (71\")         Objective   Physical Exam  VSS, NNR, NCAT, NMNA, NRD, AAOx3.      -------    Assessment & Plan:  - as noted above, the stated intervention is indicated  - Follow-up plan will be noted in the operative report        Ov 4-28      EMR Dragon/Transcription disclaimer:   Typed items in this encounter note may have been created by electronic transcription/translation software which converts spoken language to printed text. The electronic translation of spoken language may permit erroneous, or at times, nonsensical words or phrases to be inadvertently transcribed; Although I have reviewed the note for such errors, some may still exist.      "

## 2023-03-30 NOTE — OP NOTE
Right S1 Lumbar Transforaminal Epidural Steroid Injection  [facility]      PREOPERATIVE DIAGNOSIS:  Lumbar Disc Displacement and right Lumbar Radiculopathy    POSTOPERATIVE DIAGNOSIS:  Same as preop diagnosis    PROCEDURE:  CPT 80529 --  Diagnostic Transforaminal Epidural Steroid Injection at the S1 level, on the RIGHT    PRE-PROCEDURE DISCUSSION WITH PATIENT:    Risks and complications were discussed with the patient prior to starting the procedure and informed consent was obtained.  We discussed various topics including but not limited to bleeding, infection, injury, nerve injury, paralysis, coma, death, postprocedural painful flare-up, postprocedural site soreness, and a lack of pain relief.  We discussed the diagnostic aspect of transforaminal epidural / selective nerve root blockade.    SURGEON:  Andre Wilson MD    REASON FOR PROCEDURE:    Radiating pattern of pain is likely consistent with degenerative changes in the area. and Radicular pain pattern seems consistent with this dermatome.    SEDATION:  Patient declined administration of moderate sedation    ANESTHETIC:  Marcaine 0.25%  STEROID:  Methylprednisolone (DEPO MEDROL) 80mg/ml    DESCRIPTON OF PROCEDURE:  After obtaining informed consent, an I.V. was not started in the preoperative area. The patient taken to the operating room and was placed in the prone position with a pillow under the abdomen.  All pressure points were well padded.  EKG, blood pressure, and pulse oximeter were monitored.  The lumbosacral area was prepped with Chloraprep and draped in a sterile fashion. Under fluoroscopic guidance the upper vertebral-equivalent level of the sacrum was identified, and in a slightly oblique view, the S1 posterior foramen was identified, and then a point just below the foramen at 6 o'clock position was identified. Skin and subcutaneous tissue was anesthetized with 1% lidocaine. A 22-gauge spinal needle was introduced under  fluoroscopic guidance at the above junction and then redirected slightly cephalad and into the foramen without parasthesias and into the epidural space. After confirming the position of the needle with PA, lateral, and oblique fluoroscopic views, aspiration was checked and was clear of blood or CSF.  Next, 1 mL of Omnipaque was injected. After seeing adequate spread on the corresponding nerve root, a total volume 3mL of injectate containing 1ml of the above mentioned local anesthetic, 1 ml saline,  and the above mentioned corticosteroid was injected into the epidural space.    The needle was removed intact.      ESTIMATED BLOOD LOSS:  <5 mL  SPECIMENS:  none    COMPLICATIONS:   No complications were noted., There was no indication of vascular uptake on live injection of contrast dye., There was no indication of intrathecal uptake on live injection of contrast dye., There was not any evidence of dural puncture.   and The patient did not have any signs of postprocedure numbness nor weakness.    TOLERANCE & DISCHARGE CONDITION:    The patient tolerated the procedure well.  The patient was transported to the recovery area without difficulties.  The patient was discharged to home under the care of family in stable and satisfactory condition.    PLAN OF CARE:  1. The patient was given our standard instruction sheet.  2. The patient will Return to clinic 4-6 wks.  3. The patient will resume all medications as per the medication reconciliation sheet.

## 2023-03-30 NOTE — DISCHARGE INSTRUCTIONS
[facility] - Post-procedure Instructions          --  While there are no absolute restrictions, it is recommended that you do not perform strenuous activity today. In the morning, you may resume your level of activity as before your block.    --  If you have a band-aid at your injection site, please remove it later today. Observe the area for any redness, swelling, pus-like drainage, or a temperature over 101°. If any of these symptoms occur, please call your doctor at [phone]. If after office hours, leave a message and the on-call provider will return your call.    --  Ice may be applied to your injection site. It is recommended you avoid direct heat (heating pad; hot tub) for 1-2 days.    --  Call [facility] at [phone] if you experience persistent headache, persistent bleeding from the injection site, or severe pain not relieved by heat or oral medication.    --  Do not make important decisions today.    --  Due to the effects of the block and/or the I.V. Sedation, DO NOT drive or operate hazardous machinery for 12 hours.  Local anesthetics may cause numbness after procedure and precautions must be taken with regards to operating equipment as well as with walking, even if ambulating with assistance of another person or with an assistive device.    --  Do not drink alcohol for 12 hours.    -- You may return to work tomorrow, or as directed by your referring doctor.    --  Occasionally you may notice a slight increase in your pain after the procedure. This should start to improve within the next 24-48 hours. Radiofrequency ablation procedure pain may last 3-4 weeks.    --  It may take as long as 3-4 days before you notice a gradual improvement in your pain and/or other symptoms.    -- You may continue to take your prescribed pain medication as needed.    --  Some normal possible side effects of steroid use could include fluid retention, increased blood sugar, dull headache,  increased sweating, increased appetite, mood swings and flushing.    --  Diabetics are recommended to watch their blood glucose level closely for 24-48 hours after the injection.    --  Must stay in PACU for 20 min upon arrival and prove no leg weakness before being discharged.    --  IN THE EVENT OF A LIFE THREATENING EMERGENCY, (CHEST PAIN, BREATHING DIFFICULTIES, PARALYSIS…) YOU SHOULD GO TO YOUR NEAREST EMERGENCY ROOM.    --  You should be contacted by our office within 2-3 days to schedule follow up or next appointment date.  If not contacted within 7 days, please call the office at [phone]

## 2023-04-07 NOTE — PROGRESS NOTES
Patient: Sadi Reyes  YOB: 1975  Date of Service: 4/7/2023    Chief Complaints: + Right shoulder pain    Subjective:    History of Present Illness: Pt is seen in the office today with complaints of right shoulder pain he sustained an injury at work a lot of it seems biceps to me we were able to do a CT arthrogram (he cannot do an MRI) this was normal however admittedly is not the best test.  He has had persistent pain that is limited his activity his function and his work ability despite conservative management of injections physical therapy at this point I do believe labral pathology and specifically a SLAP tear as the source I would like to proceed with arthroscopy debridement or repair we did talk about options at the time of surgery including debridement versus repair rotator cuff labrum we also talked about risk benefits and alternatives        Allergies:   Allergies   Allergen Reactions   • Inderal La [Propranolol] Diarrhea and Nausea And Vomiting       Medications:   Home Medications:  Current Outpatient Medications on File Prior to Visit   Medication Sig   • amLODIPine (NORVASC) 10 MG tablet Take 1 tablet by mouth Daily.   • atorvastatin (LIPITOR) 20 MG tablet TAKE 1 TABLET BY MOUTH EVERY DAY   • escitalopram (Lexapro) 10 MG tablet Take 1 tablet by mouth Daily.   • esomeprazole (nexIUM) 40 MG capsule Take 1 capsule by mouth 2 (Two) Times a Day Before Meals.     No current facility-administered medications on file prior to visit.     Current Medications:  Scheduled Meds:  Continuous Infusions:No current facility-administered medications for this visit.    PRN Meds:.    I have reviewed the patient's medical history in detail and updated the computerized patient record.  Review and summarization of old records include:    Past Medical History:   Diagnosis Date   • Acute nephritis    • Acute pyelonephritis    • VANESSA (acute kidney injury)    • Carpal tunnel syndrome    • Cluster headache    •  Epigastric pain     epigastric/chest pain   • Headache    • Headache, tension-type    • Hyperlipidemia    • Hypertension    • Migraine    • Palpitations    • Syncope         Past Surgical History:   Procedure Laterality Date   • BUNIONECTOMY     • COLONOSCOPY N/A 11/17/2022    Procedure: COLONOSCOPY TO CECUM AND TERMINAL ILEUM;  Surgeon: Tono Romero MD;  Location: [facility] ENDOSCOPY;  Service: Gastroenterology;  Laterality: N/A;  PRE- SCREENING  POST- HEMORRHOIDS   • ENDOSCOPY N/A 11/02/2020    Procedure: ESOPHAGOGASTRODUODENOSCOPY with biopsies;  Surgeon: Tono Romero MD;  Location: [facility] ENDOSCOPY;  Service: Gastroenterology;  Laterality: N/A;  Pre: epigastric pain  Post: duodenitis, gastritis, hiatal hernia   • EPIDURAL Right 3/30/2023    Procedure: Right S1 LUMBAR/SACRAL TRANSFORAMINAL EPIDURAL 83299;  Surgeon: Andre Wilson MD;  Location: [facility] OR;  Service: Pain Management;  Laterality: Right;   • FOOT SURGERY     • HERNIA REPAIR          Social History     Occupational History   • Not on file   Tobacco Use   • Smoking status: Never   • Smokeless tobacco: Never   • Tobacco comments:     rare caffeine   Vaping Use   • Vaping Use: Never used   Substance and Sexual Activity   • Alcohol use: Yes     Comment: Twice a month   • Drug use: Yes     Frequency: 1.0 times per week     Types: Marijuana     Comment: uses daily   • Sexual activity: Yes     Partners: Female      Social History     Social History Narrative   • Not on file        Family History   Problem Relation Age of Onset   • Hypertension Mother    • No Known Problems Father    • No Known Problems Sister    • Prostate cancer Maternal Grandfather        ROS: 14 point review of systems was performed and was negative except for documented findings in HPI and today's encounter.     Allergies:   Allergies   Allergen Reactions   • Inderal La [Propranolol] Diarrhea and Nausea And Vomiting     Constitutional:  Denies fever, shaking or chills    Eyes:  Denies change in visual acuity   HENT:  Denies nasal congestion or sore throat   Respiratory:  Denies cough or shortness of breath   Cardiovascular:  Denies chest pain or severe LE edema   GI:  Denies abdominal pain, nausea, vomiting, bloody stools or diarrhea   Musculoskeletal:  Numbness, tingling, or loss of motor function only as noted above in history of present illness.  : Denies painful urination or hematuria  Integument:  Denies rash, lesion or ulceration   Neurologic:  Denies headache or focal weakness  Endocrine:  Denies lymphadenopathy  Psych:  Denies confusion or change in mental status   Hem:  Denies active bleeding      Physical Exam: 47 y.o. male  Wt Readings from Last 3 Encounters:   03/30/23 83 kg (183 lb)   03/17/23 83.9 kg (185 lb)   03/13/23 85 kg (187 lb 6.4 oz)       There is no height or weight on file to calculate BMI.    There were no vitals filed for this visit.  Vital signs reviewed.   General Appearance:    Alert, cooperative, in no acute distress                    Ortho exam  Physical exam of the right shoulder reveals no overlying skin changes no lymphedema no lymphadenopathy.  Patient has active flexion 180 with mild symptoms abduction is similar external rotation is to 50 and internal rotation to the upper lumbar spine with mild symptoms.  Patient has good rotator cuff strength 4+ over 5 with isometric strength testing with pain.  Patient has a positive impingement and a positive Pack sign.  Patient has good cervical range of motion which is full and asymptomatic no radicular symptoms.  Patient has a normal elbow exam.  Good distal pulses are present  Patient has pain with overhead activity and a positive Neer sign and a positive empty can sign , a positive drop arm and a definitive painful arc  He does have pain with stressing of his biceps anchor       Physical Exam: 47 y.o. male  General Appearance:    Alert, cooperative, in no acute distress                     "  Vitals:    04/10/23 1004   Temp: 97.1 °F (36.2 °C)   Weight: 83.8 kg (184 lb 11.2 oz)   Height: 180.3 cm (71\")   PainSc:   5        Head:    Normocephalic, without obvious abnormality, atraumatic   Eyes:            conjunctivae and sclerae normal, no pallor, corneas clear,    Ears:    Ears appear intact with no abnormalities noted   Throat:   No oral lesions, no thrush, oral mucosa moist   Neck:   No adenopathy, supple, trachea midline, no thyromegaly,    Back:     No kyphosis present, no scoliosis present, no skin lesions,      erythema or scars, no tenderness to percussion or                   palpation,   range of motion normal   Lungs:     ,respirations regular, even and                  unlabored    Heart:    Regular rhythm and normal rate               Chest Wall:    No abnormalities observed               Pulses:   Pulses palpable and equal bilaterally   Skin:   No bleeding, bruising or rash   Lymph nodes:   No palpable adenopathy   Neurologic:   Appears neurologic intact         Assessment: Persistent right shoulder pain despite conservative measures plan is to proceed with arthroscopy debridement versus repair would evaluate all structures and proceed as deemed appropriate with any pathology that is found we discussed in detail risk benefits and alternatives The patient voiced understanding of the risks, benefits, and alternative forms of treatment that were discussed and the patient consents to proceed with the above listed surgery.  All risks, benefits and alternatives were discussed.  Risks including to but not exclusive to anesthetic complications, including death, MI, CVA, infection, bleeding DVT, fracture, residual pain and need for future surgery.  He understands this and agrees to proceed    Plan: Is to proceed with arthroscopy      Gardenia Norris M.D.        "

## 2023-04-10 ENCOUNTER — OFFICE VISIT (OUTPATIENT)
Dept: ORTHOPEDIC SURGERY | Facility: CLINIC | Age: 48
End: 2023-04-10
Payer: OTHER MISCELLANEOUS

## 2023-04-10 VITALS — TEMPERATURE: 97.1 F | HEIGHT: 71 IN | WEIGHT: 184.7 LBS | BODY MASS INDEX: 25.86 KG/M2

## 2023-04-10 DIAGNOSIS — S43.431D SUPERIOR GLENOID LABRUM LESION OF RIGHT SHOULDER, SUBSEQUENT ENCOUNTER: Primary | ICD-10-CM

## 2023-04-10 NOTE — LETTER
April 10, 2023     Patient: Sadi Reyes   YOB: 1975   Date of Visit: 4/10/2023       To Whom It May Concern:    It is my medical opinion that Sadi Reyes  will be off work at this time for right shoulder surgery.           Sincerely,        Gardenia Norris MD    CC:   No Recipients

## 2023-04-19 ENCOUNTER — HOSPITAL ENCOUNTER (OUTPATIENT)
Dept: PHYSICAL THERAPY | Facility: HOSPITAL | Age: 48
Setting detail: THERAPIES SERIES
Discharge: HOME | End: 2023-04-19
Payer: COMMERCIAL

## 2023-04-19 DIAGNOSIS — M54.2 NECK PAIN: ICD-10-CM

## 2023-04-19 DIAGNOSIS — M54.2 PAIN, NECK: ICD-10-CM

## 2023-04-19 DIAGNOSIS — M25.511 RIGHT SHOULDER PAIN, UNSPECIFIED CHRONICITY: ICD-10-CM

## 2023-04-19 DIAGNOSIS — M54.41 RIGHT-SIDED LOW BACK PAIN WITH RIGHT-SIDED SCIATICA, UNSPECIFIED CHRONICITY: Primary | ICD-10-CM

## 2023-04-19 PROCEDURE — 97110 THERAPEUTIC EXERCISES: CPT

## 2023-04-19 NOTE — THERAPY TREATMENT NOTE
Outpatient Physical Therapy Ortho Treatment Note  [facility]     Patient Name: Sadi Graves  : 1975  MRN: [id number]  Today's Date: 2023      Visit Date: 2023    Visit Dx:    ICD-10-CM ICD-9-CM   1. Right-sided low back pain with right-sided sciatica, unspecified chronicity  M54.41 724.3   2. Right shoulder pain, unspecified chronicity  M25.511 719.41   3. Neck pain  M54.2 723.1   4. Pain, neck  M54.2 723.1       Patient Active Problem List   Diagnosis   • Acute pyelonephritis   • VANESSA (acute kidney injury)   • Leucocytosis   • Dehydration   • Nausea vomiting and diarrhea   • Acute abdominal pain   • Stage 3a chronic kidney disease   • Hypertension   • Diastolic dysfunction without heart failure   • Acute gastritis without bleeding   • Acute duodenitis   • Non-cardiac chest pain   • Cervicogenic headache   • Vasovagal syncope   • Encounter for screening for malignant neoplasm of colon   • Lumbar disc herniation with radiculopathy        Past Medical History:   Diagnosis Date   • Acute nephritis    • Acute pyelonephritis    • VANESSA (acute kidney injury)    • Carpal tunnel syndrome    • Cluster headache    • Epigastric pain     epigastric/chest pain   • Headache    • Headache, tension-type    • Hyperlipidemia    • Hypertension    • Migraine    • Palpitations    • Syncope         Past Surgical History:   Procedure Laterality Date   • BUNIONECTOMY     • COLONOSCOPY N/A 2022    Procedure: COLONOSCOPY TO CECUM AND TERMINAL ILEUM;  Surgeon: Tono Romero MD;  Location: [facility] ENDOSCOPY;  Service: Gastroenterology;  Laterality: N/A;  PRE- SCREENING  POST- HEMORRHOIDS   • ENDOSCOPY N/A 2020    Procedure: ESOPHAGOGASTRODUODENOSCOPY with biopsies;  Surgeon: Tono Romero MD;  Location: [facility] ENDOSCOPY;  Service: Gastroenterology;  Laterality: N/A;  Pre: epigastric pain  Post: duodenitis, gastritis, hiatal hernia   • EPIDURAL Right 3/30/2023    Procedure: Right S1 LUMBAR/SACRAL  TRANSFORAMINAL EPIDURAL 30015;  Surgeon: Andre Wilson MD;  Location: [facility] MAIN OR;  Service: Pain Management;  Laterality: Right;   • FOOT SURGERY     • HERNIA REPAIR                          PT Assessment/Plan     Row Name 04/19/23 0800          PT Assessment    Assessment Comments Pt arrives today stating he plans to have surgery for his shoulder, although this has not been scheduled. Pt c/o inc'd LBP since epidural, focused this session on LE strength and flexibility. Pt reports feeling better by end of the session. Added HS stretch and fwd/back monster walks with good tolerance. Pt continues to benefit from skilled PT.  -DB        PT Plan    PT Plan Comments Has surgery been scheduled?  -DB           User Key  (r) = Recorded By, (t) = Taken By, (c) = Cosigned By    Initials Name Provider Type    DB Lyubov Barragan, PT Physical Therapist                   OP Exercises     Row Name 04/19/23 0700             Subjective Comments    Subjective Comments Pt states he has decided to have surgery on shoulder, has not been scheduled yet. States his back pain has been worse since receiving the epirdural.  -DB         Subjective Pain    Able to rate subjective pain? yes  -DB      Pre-Treatment Pain Level 4  -DB         Total Minutes    64124 - PT Therapeutic Exercise Minutes 38  -DB         Exercise 1    Exercise Name 1 Nustep  -DB      Cueing 1 Verbal;Demo  -DB      Time 1 5 min  -DB      Additional Comments UE/LE  -DB         Exercise 2    Exercise Name 2 LTR  -DB      Cueing 2 Verbal;Demo  -DB      Reps 2 10ea  -DB      Time 2 5sec  -DB         Exercise 3    Exercise Name 3 piriformis/fig 4 stretch  -DB      Cueing 3 Verbal;Demo  -DB      Reps 3 3e  -DB      Time 3 20sec  -DB         Exercise 5    Exercise Name 5 S/L thoracolumbar rotation  -DB      Cueing 5 Verbal;Demo  -DB      Reps 5 10e  -DB      Time 5 5sec  -DB      Additional Comments arm extended  -DB         Exercise 7    Exercise Name 7 clamshells  -DB       Cueing 7 Verbal;Tactile  -DB      Sets 7 2  -DB      Reps 7 10  -DB      Additional Comments RTB  -DB         Exercise 8    Exercise Name 8 bridge w/ OH DB hold  -DB      Cueing 8 Verbal;Demo  -DB      Sets 8 2  -DB      Reps 8 10  -DB      Time 8 8# DB  -DB      Additional Comments RTB at knees  -DB         Exercise 9    Exercise Name 9 HS stretch  -DB      Cueing 9 Verbal;Demo  -DB      Reps 9 3  -DB      Time 9 20 sec  -DB      Additional Comments at step  -DB         Exercise 11    Exercise Name 11 90/90 sciatic n glide  -DB      Cueing 11 Verbal  -DB      Reps 11 20 ea leg  -DB         Exercise 12    Exercise Name 12 alt shoulder ext + TrA  -DB      Cueing 12 Verbal;Demo  -DB      Reps 12 2x10  -DB      Time 12 GTB  -DB         Exercise 13    Exercise Name 13 monster walks  -DB      Cueing 13 Verbal;Demo  -DB      Sets 13 3 laps  -DB      Time 13 RTB at ankles  -DB         Exercise 20    Exercise Name 20 sidestepping  -DB      Cueing 20 Verbal;Demo  -DB      Reps 20 3 laps  -DB      Time 20 RTB at ankles  -DB            User Key  (r) = Recorded By, (t) = Taken By, (c) = Cosigned By    Initials Name Provider Type    DB Lyubov Barragan, PT Physical Therapist                                                Time Calculation:   Start Time: 0747  Stop Time: 0825  Time Calculation (min): 38 min  Total Timed Code Minutes- PT: 38 minute(s)  Timed Charges  84642 - PT Therapeutic Exercise Minutes: 38  Total Minutes  Timed Charges Total Minutes: 38   Total Minutes: 38  Therapy Charges for Today     Code Description Service Date Service Provider Modifiers Qty    90769405555 HC PT THER PROC EA 15 MIN 4/19/2023 Lyubov Barragan, PT GP 3                    Lyubov Barragan PT  4/19/2023

## 2023-04-24 ENCOUNTER — TELEPHONE (OUTPATIENT)
Dept: ORTHOPEDIC SURGERY | Facility: CLINIC | Age: 48
End: 2023-04-24

## 2023-04-24 PROBLEM — S43.431A SUPERIOR GLENOID LABRUM LESION OF RIGHT SHOULDER: Status: ACTIVE | Noted: 2023-04-24

## 2023-04-24 NOTE — TELEPHONE ENCOUNTER
Caller: Sadi Reyes    Relationship to patient: Self    Best call back number: [phone]    Chief complaint: RIGHT SHOULDER & ARM PAIN    Type of visit: SURGERY    Requested date: ASAP      Additional notes: PATIENT STATES HE REC'D PAPERWORK FROM [facility] THAT HIS SURGERY HAS BEEN APPROVED AS OF 04/12/23 AND HAS BEEN WAITING TO GET SCHEDULED - UNABLE TO WARM TRANSFER. HE WANTS TO GET THIS SCHEDULED ASAP, PLEASE

## 2023-04-26 ENCOUNTER — APPOINTMENT (OUTPATIENT)
Dept: PHYSICAL THERAPY | Facility: HOSPITAL | Age: 48
End: 2023-04-26
Payer: COMMERCIAL

## 2023-04-28 ENCOUNTER — PREP FOR SURGERY (OUTPATIENT)
Dept: SURGERY | Facility: SURGERY CENTER | Age: 48
End: 2023-04-28
Payer: COMMERCIAL

## 2023-04-28 ENCOUNTER — OFFICE VISIT (OUTPATIENT)
Dept: PAIN MEDICINE | Facility: CLINIC | Age: 48
End: 2023-04-28
Payer: OTHER MISCELLANEOUS

## 2023-04-28 VITALS
HEIGHT: 71 IN | DIASTOLIC BLOOD PRESSURE: 85 MMHG | RESPIRATION RATE: 12 BRPM | BODY MASS INDEX: 25.9 KG/M2 | OXYGEN SATURATION: 96 % | WEIGHT: 185 LBS | HEART RATE: 64 BPM | SYSTOLIC BLOOD PRESSURE: 130 MMHG | TEMPERATURE: 97.5 F

## 2023-04-28 DIAGNOSIS — M51.16 LUMBAR DISC HERNIATION WITH RADICULOPATHY: Primary | ICD-10-CM

## 2023-04-28 NOTE — PROGRESS NOTES
CHIEF COMPLAINT  Procedure follow up Right S1 LUMBAR/SACRAL TRANSFORAMINAL EPIDURAL  3-30-23  Patient states LSTE was not effective pain level got worse.    Subjective   Sadi Reyes is a 47 y.o. male  who presents to the office for follow-up of procedure.  He completed a Right S1 TFESI   on  3/30/2023 performed by Dr. Wilson for management of back pain. Patient reports less than 50% relief from the procedure for the first 2 days and then his pain worsened.     Today his pain is 5/10VAS in severity. His pain remains in his low back and is radiating into the posterior aspect of his right leg into his foot. This is worsened by prolonged sitting and decreased activity. It is improved by his HEP (prescribed by PT), standing and shifting his weight. He is utilizing OTC tylenol which is helpful to his pain.     Back Pain  The current episode started more than 1 month ago. The problem occurs constantly. The problem has been gradually worsening since onset. The pain is present in the lumbar spine. The quality of the pain is described as aching and burning (numbing). The pain radiates to the right thigh and right foot (right calf (posterior aspect)). The pain is at a severity of 5/10. The symptoms are aggravated by standing, sitting, bending and twisting (walking). Associated symptoms include numbness (right leg ). Pertinent negatives include no abdominal pain, chest pain, dysuria, fever, headaches or weakness. He has tried ice, heat, NSAIDs and analgesics for the symptoms.      PEG Assessment   What number best describes your pain on average in the past week?8  What number best describes how, during the past week, pain has interfered with your enjoyment of life?8  What number best describes how, during the past week, pain has interfered with your general activity?  8    The following portions of the patient's history were reviewed and updated as appropriate: allergies, current medications, past family history, past  "medical history, past social history, past surgical history and problem list.    Review of Systems   Constitutional: Negative for activity change, fatigue and fever.   HENT: Negative for congestion.    Eyes: Negative for visual disturbance.   Respiratory: Negative for cough and chest tightness.    Cardiovascular: Negative for chest pain.   Gastrointestinal: Negative for abdominal pain, constipation and diarrhea.   Genitourinary: Negative for difficulty urinating and dysuria.   Musculoskeletal: Positive for back pain.   Neurological: Positive for numbness (right leg ). Negative for dizziness, weakness, light-headedness and headaches.   Psychiatric/Behavioral: Positive for sleep disturbance (pain). Negative for agitation and suicidal ideas. The patient is not nervous/anxious.      --  The aforementioned information the Chief Complaint section and above subjective data including any HPI data, and also the Review of Systems data, has been personally reviewed and affirmed.  --     Vitals:    04/28/23 0750   BP: 130/85   BP Location: Left arm   Patient Position: Sitting   Cuff Size: Large Adult   Pulse: 64   Resp: 12   Temp: 97.5 °F (36.4 °C)   TempSrc: Temporal   SpO2: 96%   Weight: 83.9 kg (185 lb)   Height: 180.3 cm (71\")   PainSc:   5     Objective   Physical Exam  Vitals and nursing note reviewed.   Constitutional:       Appearance: Normal appearance. He is well-developed.   Eyes:      General: Lids are normal.   Cardiovascular:      Rate and Rhythm: Normal rate.   Pulmonary:      Effort: Pulmonary effort is normal.   Musculoskeletal:      Lumbar back: Tenderness present. Decreased range of motion. Positive right straight leg raise test. Negative left straight leg raise test.   Neurological:      Mental Status: He is alert and oriented to person, place, and time.   Psychiatric:         Attention and Perception: Attention normal.         Mood and Affect: Mood normal.         Speech: Speech normal.         Behavior: " Behavior normal.         Judgment: Judgment normal.       Assessment & Plan   Diagnoses and all orders for this visit:    1. Lumbar disc herniation with radiculopathy (Primary)      --- LESI at L5/S1 right of midline   Reviewed the procedure at length with the patient.  Included in the review was expectations, complications, risk and benefits.The procedure was described in detail and the risks, benefits and alternatives were discussed with the patient (including but not limited to: bleeding, infection, nerve damage, worsening of pain, inability to perform injection, paralysis, seizures, coma, no pain relief and death) who agreed to proceed.  Discussed the potential for sedation if warranted/wanted.  The procedure will plan to be performed at [facility] with fluoroscopic guidance(unless ultrasound is indicated) and could potentially have steroids and contrast dye used. Questions were answered and in a way the patient could understand.  Patient verbalized understanding and wishes to proceed.  This intervention will be ordered.  Discussed with patient that all procedures are part of a multimodal plan of care and include either formal PT or a home exercise program.  Patient has no evidence of coagulopathy or current infection.  --- Keep upcoming follow up with CHARI Preez  --- Follow-up after procedure     Dictated utilizing Dragon dictation.

## 2023-05-03 ENCOUNTER — PRE-ADMISSION TESTING (OUTPATIENT)
Dept: PREADMISSION TESTING | Facility: HOSPITAL | Age: 48
End: 2023-05-03
Payer: COMMERCIAL

## 2023-05-03 ENCOUNTER — TELEPHONE (OUTPATIENT)
Dept: ORTHOPEDIC SURGERY | Facility: CLINIC | Age: 48
End: 2023-05-03

## 2023-05-03 VITALS
HEART RATE: 67 BPM | DIASTOLIC BLOOD PRESSURE: 94 MMHG | SYSTOLIC BLOOD PRESSURE: 146 MMHG | TEMPERATURE: 98 F | OXYGEN SATURATION: 99 % | RESPIRATION RATE: 16 BRPM | BODY MASS INDEX: 25.76 KG/M2 | WEIGHT: 184 LBS | HEIGHT: 71 IN

## 2023-05-03 LAB
ANION GAP SERPL CALCULATED.3IONS-SCNC: 9.3 MMOL/L (ref 5–15)
BUN SERPL-MCNC: 8 MG/DL (ref 6–20)
BUN/CREAT SERPL: 7.5 (ref 7–25)
CALCIUM SPEC-SCNC: 9.3 MG/DL (ref 8.6–10.5)
CHLORIDE SERPL-SCNC: 104 MMOL/L (ref 98–107)
CO2 SERPL-SCNC: 24.7 MMOL/L (ref 22–29)
CREAT SERPL-MCNC: 1.06 MG/DL (ref 0.76–1.27)
DEPRECATED RDW RBC AUTO: 44.7 FL (ref 37–54)
EGFRCR SERPLBLD CKD-EPI 2021: 88.2 ML/MIN/1.73
ERYTHROCYTE [DISTWIDTH] IN BLOOD BY AUTOMATED COUNT: 13.2 % (ref 12.3–15.4)
GLUCOSE SERPL-MCNC: 99 MG/DL (ref 65–99)
HCT VFR BLD AUTO: 46.8 % (ref 37.5–51)
HGB BLD-MCNC: 15.7 G/DL (ref 13–17.7)
MCH RBC QN AUTO: 31 PG (ref 26.6–33)
MCHC RBC AUTO-ENTMCNC: 33.5 G/DL (ref 31.5–35.7)
MCV RBC AUTO: 92.5 FL (ref 79–97)
PLATELET # BLD AUTO: 204 10*3/MM3 (ref 140–450)
PMV BLD AUTO: 10.6 FL (ref 6–12)
POTASSIUM SERPL-SCNC: 3.9 MMOL/L (ref 3.5–5.2)
RBC # BLD AUTO: 5.06 10*6/MM3 (ref 4.14–5.8)
SODIUM SERPL-SCNC: 138 MMOL/L (ref 136–145)
WBC NRBC COR # BLD: 4.8 10*3/MM3 (ref 3.4–10.8)

## 2023-05-03 PROCEDURE — 85027 COMPLETE CBC AUTOMATED: CPT

## 2023-05-03 PROCEDURE — 80048 BASIC METABOLIC PNL TOTAL CA: CPT

## 2023-05-03 PROCEDURE — 36415 COLL VENOUS BLD VENIPUNCTURE: CPT

## 2023-05-03 RX ORDER — ACETAMINOPHEN 500 MG
500 TABLET ORAL EVERY 6 HOURS PRN
Status: ON HOLD | COMMUNITY

## 2023-05-03 NOTE — DISCHARGE INSTRUCTIONS
Arrive to hospital on your day of surgery at  (WILL CALL W/ARRIVAL TIME)    Take the following medications the morning of surgery:  NEXIUM AND AMLODIPINE      If you are on prescription narcotic pain medication to control your pain you may also take that medication the morning of surgery.    General Instructions:  Do not eat solid food after midnight the night before surgery.  You may drink clear liquids day of surgery but must stop at least one hour before your hospital arrival time.  It is beneficial for you to have a clear drink that contains carbohydrates the day of surgery.  We suggest a 12 to 20 ounce bottle of Gatorade or Powerade for non-diabetic patients or a 12 to 20 ounce bottle of G2 or Powerade Zero for diabetic patients. (Pediatric patients, are not advised to drink a 12 to 20 ounce carbohydrate drink)    Clear liquids are liquids you can see through.  Nothing red in color.     Plain water                               Sports drinks  Sodas                                   Gelatin (Jell-O)  Fruit juices without pulp such as white grape juice and apple juice  Popsicles that contain no fruit or yogurt  Tea or coffee (no cream or milk added)  Gatorade / Powerade  G2 / Powerade Zero    Infants may have breast milk up to four hours before surgery.  Infants drinking formula may drink formula up to six hours before surgery.   Patients who avoid smoking, chewing tobacco and alcohol for 4 weeks prior to surgery have a reduced risk of post-operative complications.  Quit smoking as many days before surgery as you can.  Do not smoke, use chewing tobacco or drink alcohol the day of surgery.   If applicable bring your C-PAP/ BI-PAP machine in with you to preop day of surgery.  Bring any papers given to you in the doctor’s office.  Wear clean comfortable clothes.  Do not wear contact lenses, false eyelashes or make-up.  Bring a case for your glasses.   Bring crutches or walker if applicable.  Remove all piercings.   Leave jewelry and any other valuables at home.  Hair extensions with metal clips must be removed prior to surgery.  The Pre-Admission Testing nurse will instruct you to bring medications if unable to obtain an accurate list in Pre-Admission Testing.        If you were given a blood bank ID arm band remember to bring it with you the day of surgery.    Preventing a Surgical Site Infection:  For 2 to 3 days before surgery, avoid shaving with a razor because the razor can irritate skin and make it easier to develop an infection.    Any areas of open skin can increase the risk of a post-operative wound infection by allowing bacteria to enter and travel throughout the body.  Notify your surgeon if you have any skin wounds / rashes even if it is not near the expected surgical site.  The area will need assessed to determine if surgery should be delayed until it is healed.  The night prior to surgery shower using a fresh bar of anti-bacterial soap (such as Dial) and clean washcloth.  Sleep in a clean bed with clean clothing.  Do not allow pets to sleep with you.  Shower on the morning of surgery using a fresh bar of anti-bacterial soap (such as Dial) and clean washcloth.  Dry with a clean towel and dress in clean clothing.  Ask your surgeon if you will be receiving antibiotics prior to surgery.  Make sure you, your family, and all healthcare providers clean their hands with soap and water or an alcohol based hand  before caring for you or your wound.    Day of surgery:  Your arrival time is approximately two hours before your scheduled surgery time.  Upon arrival, a Pre-op nurse and Anesthesiologist will review your health history, obtain vital signs, and answer questions you may have.  The only belongings needed at this time will be a list of your home medications and if applicable your C-PAP/BI-PAP machine.  A Pre-op nurse will start an IV and you may receive medication in preparation for surgery, including  something to help you relax.     Please be aware that surgery does come with discomfort.  We want to make every effort to control your discomfort so please discuss any uncontrolled symptoms with your nurse.   Your doctor will most likely have prescribed pain medications.      If you are going home after surgery you will receive individualized written care instructions before being discharged.  A responsible adult must drive you to and from the hospital on the day of your surgery and stay with you for 24 hours.  Discharge prescriptions can be filled by the hospital pharmacy during regular pharmacy hours.  If you are having surgery late in the day/evening your prescription may be e-prescribed to your pharmacy.  Please verify your pharmacy hours or chose a 24 hour pharmacy to avoid not having access to your prescription because your pharmacy has closed for the day.    If you are staying overnight following surgery, you will be transported to your hospital room following the recovery period.  [facility] has all private rooms.    If you have any questions please call Pre-Admission Testing at [phone].  Deductibles and co-payments are collected on the day of service. Please be prepared to pay the required co-pay, deductible or deposit on the day of service as defined by your plan.    Call your surgeon immediately if you experience any of the following symptoms:  Sore Throat  Shortness of Breath or difficulty breathing  Cough  Chills  Body soreness or muscle pain  Headache  Fever  New loss of taste or smell  Do not arrive for your surgery ill.  Your procedure will need to be rescheduled to another time.  You will need to call your physician before the day of surgery to avoid any unnecessary exposure to hospital staff as well as other patients.

## 2023-05-03 NOTE — TELEPHONE ENCOUNTER
Caller: PATIENT    Relationship to patient: SELF     Best call back number: [phone]    Patient is needing: PATIENT HAD A PRE-OP APPT AT THE HOSPITAL THIS MORNING AT 11:30 AM AND WHILE HE WAS THERE THEY INFORMED HIM THEY DO NOT HAVE THE WORK COMP INFORMATION NEEDED FOR THIS APPT. I ATTEMPTED TO WARM TRANSFER CALL TO THE OFFICE BUT RECEIVED NO ANSWER. PATIENT STATED HE GUESS HE WILL GO AHEAD AND CANCEL HIS APPT FOR TODAY AND HIS SURGERY SINCE THE WORK COMP INFORMATION HAS NOT BEEN RECEIVED. PATIENT STATED HE HAS LEFT A FEW MESSAGES ABOUT HIS SURGERY AND RECEIVING A LETTER FROM HIS HEALTH INSURANCE COMPANY BUT NEVER RECEIVED A CALL BACK. PATIENT WOULD LIKE A CALL BACK ASAP. THANK YOU!

## 2023-05-08 ENCOUNTER — OFFICE VISIT (OUTPATIENT)
Dept: ORTHOPEDIC SURGERY | Facility: CLINIC | Age: 48
End: 2023-05-08
Payer: COMMERCIAL

## 2023-05-08 VITALS — TEMPERATURE: 97.4 F | HEIGHT: 71 IN | BODY MASS INDEX: 25.81 KG/M2 | WEIGHT: 184.4 LBS

## 2023-05-08 DIAGNOSIS — M54.16 LUMBAR RADICULOPATHY: Primary | ICD-10-CM

## 2023-05-08 NOTE — PROGRESS NOTES
Patient Name: Sadi Reyes   YOB: 1975  Referring Primary Care Physician: Alek Estrada MD      Chief Complaint:    Chief Complaint   Patient presents with   • Cervical Spine - Follow-up, Pain        HPI:  Sadi Reyes is a 47 y.o. male who presents to [facility] for follow-up of low back pain referring down the right lower extremity.  Since last visit he has been completing physical therapy for the neck, right shoulder and right lower extremity.  He also has had 1 right TFESI with Dr. Wilson which did not offer him much relief.  He also has upcoming shoulder arthroscopy with Dr. Norris scheduled 5/10/2023.  Recall that work comp denied the claim for the low back and right lower extremity pain so we have not had advanced imaging other than lumbar CT which was done at [place] in December.  He cannot have MRI due to retained shrapnel.    PFSH:  See attached    ROS: As per HPI, otherwise negative    Objective:      47 y.o. male  Body mass index is 25.73 kg/m²., 83.6 kg (184 lb 6.4 oz), @HT@  Vitals:    05/08/23 1410   Temp: 97.4 °F (36.3 °C)         Neurologic Exam     Gait, Coordination, and Reflexes     Gait  Gait: normal    Reflexes   Right achilles: 2/4  Left achilles: 1/4     Ortho Exam    Spine Musculoskeletal Exam    Gait    Gait is normal.    Strength    Thoracolumbar    Thoracolumbar motor exam is normal.     Sensory    Thoracolumbar    Thoracolumbar sensation is normal.    Reflexes    Right      Quadriceps: 2/4      Achilles: 2/4     Left      Quadriceps: 2/4      Achilles: 1/4    General      Constitutional: well-developed    Scleral icterus: no    Labored breathing: no    Psychiatric: normal mood and affect and no acute distress    Neurological: alert and oriented x3    Skin: intact        IMAGING:           Assessment:           Diagnoses and all orders for this visit:    1. Lumbar radiculopathy (Primary)             Plan:  He is going to complete the  physical therapy and was advised to continue HEP.  He has an appointment tomorrow and will likely need postop therapy after the shoulder surgery.  The plan through pain management right now is to try a interlaminar epidural which I agree may give him more relief.  If he does not gain relief from the epidurals and therapy and continues to have right lower extremity pain, once he recovers from his shoulder surgery I would consider lumbar CT myelogram which was not done previously as it was not approved by work comp.  We discussed today he may need to pursue this through his healthcare insurance.  He says work comp ultimately denied his claim because he did not relate the low back and right lower extremity pain for 48 hours.  He asks if it is uncommon to develop symptoms 2 days after an injury.  He was advised that he may eventually need an CHAU or referral to physical medicine and rehab for his work-related questions as I do not identify causation, but I am happy to help treat his symptoms.  Follow-up after LESI if not better.      Return if symptoms worsen or fail to improve.    EMR Dragon/Transcription Disclaimer:   Much of this encounter note is an electronic transcription/translation of spoken language to printed text. The electronic translation of spoken language may permit erroneous, or at times, nonsensical words or phrases to be inadvertently transcribed; Although I have reviewed the note for such errors, some may still exist.

## 2023-05-10 ENCOUNTER — HOSPITAL ENCOUNTER (OUTPATIENT)
Facility: HOSPITAL | Age: 48
Setting detail: HOSPITAL OUTPATIENT SURGERY
Discharge: HOME | End: 2023-05-10
Attending: ORTHOPAEDIC SURGERY | Admitting: ORTHOPAEDIC SURGERY
Payer: OTHER MISCELLANEOUS

## 2023-05-10 ENCOUNTER — ANESTHESIA EVENT (OUTPATIENT)
Dept: PERIOP | Facility: HOSPITAL | Age: 48
End: 2023-05-10
Payer: OTHER MISCELLANEOUS

## 2023-05-10 ENCOUNTER — ANESTHESIA (OUTPATIENT)
Dept: PERIOP | Facility: HOSPITAL | Age: 48
End: 2023-05-10
Payer: OTHER MISCELLANEOUS

## 2023-05-10 VITALS
RESPIRATION RATE: 18 BRPM | TEMPERATURE: 97.5 F | HEART RATE: 61 BPM | DIASTOLIC BLOOD PRESSURE: 92 MMHG | SYSTOLIC BLOOD PRESSURE: 142 MMHG | OXYGEN SATURATION: 93 %

## 2023-05-10 DIAGNOSIS — S43.431D SUPERIOR GLENOID LABRUM LESION OF RIGHT SHOULDER, SUBSEQUENT ENCOUNTER: ICD-10-CM

## 2023-05-10 PROCEDURE — 25010000002 EPINEPHRINE PER 0.1 MG: Performed by: ORTHOPAEDIC SURGERY

## 2023-05-10 PROCEDURE — 25010000002 ROPIVACAINE PER 1 MG: Performed by: ANESTHESIOLOGY

## 2023-05-10 PROCEDURE — 25010000002 FENTANYL CITRATE (PF) 50 MCG/ML SOLUTION: Performed by: ANESTHESIOLOGY

## 2023-05-10 PROCEDURE — 25010000002 ONDANSETRON PER 1 MG: Performed by: NURSE ANESTHETIST, CERTIFIED REGISTERED

## 2023-05-10 PROCEDURE — 25010000002 DEXAMETHASONE PER 1 MG: Performed by: ANESTHESIOLOGY

## 2023-05-10 PROCEDURE — 29826 SHO ARTHRS SRG DECOMPRESSION: CPT | Performed by: ORTHOPAEDIC SURGERY

## 2023-05-10 PROCEDURE — 25010000002 FENTANYL CITRATE (PF) 100 MCG/2ML SOLUTION: Performed by: NURSE ANESTHETIST, CERTIFIED REGISTERED

## 2023-05-10 PROCEDURE — 25010000002 CEFAZOLIN IN DEXTROSE 2-4 GM/100ML-% SOLUTION: Performed by: ORTHOPAEDIC SURGERY

## 2023-05-10 PROCEDURE — 25010000002 PROPOFOL 10 MG/ML EMULSION: Performed by: NURSE ANESTHETIST, CERTIFIED REGISTERED

## 2023-05-10 PROCEDURE — 25010000002 SUGAMMADEX 200 MG/2ML SOLUTION: Performed by: NURSE ANESTHETIST, CERTIFIED REGISTERED

## 2023-05-10 PROCEDURE — 25010000002 MIDAZOLAM PER 1 MG: Performed by: ANESTHESIOLOGY

## 2023-05-10 PROCEDURE — 29822 SHO ARTHRS SRG LMTD DBRDMT: CPT | Performed by: ORTHOPAEDIC SURGERY

## 2023-05-10 RX ORDER — SODIUM CHLORIDE 0.9 % (FLUSH) 0.9 %
3-10 SYRINGE (ML) INJECTION AS NEEDED
Status: DISCONTINUED | OUTPATIENT
Start: 2023-05-10 | End: 2023-05-10 | Stop reason: HOSPADM

## 2023-05-10 RX ORDER — ONDANSETRON 2 MG/ML
INJECTION INTRAMUSCULAR; INTRAVENOUS AS NEEDED
Status: DISCONTINUED | OUTPATIENT
Start: 2023-05-10 | End: 2023-05-10 | Stop reason: SURG

## 2023-05-10 RX ORDER — MIDAZOLAM HYDROCHLORIDE 1 MG/ML
1 INJECTION INTRAMUSCULAR; INTRAVENOUS
Status: DISCONTINUED | OUTPATIENT
Start: 2023-05-10 | End: 2023-05-10 | Stop reason: HOSPADM

## 2023-05-10 RX ORDER — FLUMAZENIL 0.1 MG/ML
0.2 INJECTION INTRAVENOUS AS NEEDED
Status: DISCONTINUED | OUTPATIENT
Start: 2023-05-10 | End: 2023-05-16 | Stop reason: HOSPADM

## 2023-05-10 RX ORDER — ROPIVACAINE HYDROCHLORIDE 5 MG/ML
INJECTION, SOLUTION EPIDURAL; INFILTRATION; PERINEURAL
Status: COMPLETED | OUTPATIENT
Start: 2023-05-10 | End: 2023-05-10

## 2023-05-10 RX ORDER — PROMETHAZINE HYDROCHLORIDE 25 MG/1
25 TABLET ORAL ONCE AS NEEDED
Status: DISCONTINUED | OUTPATIENT
Start: 2023-05-10 | End: 2023-05-16 | Stop reason: HOSPADM

## 2023-05-10 RX ORDER — OXYCODONE AND ACETAMINOPHEN 7.5; 325 MG/1; MG/1
1 TABLET ORAL EVERY 4 HOURS PRN
Status: DISCONTINUED | OUTPATIENT
Start: 2023-05-10 | End: 2023-05-16 | Stop reason: HOSPADM

## 2023-05-10 RX ORDER — FENTANYL CITRATE 50 UG/ML
INJECTION, SOLUTION INTRAMUSCULAR; INTRAVENOUS AS NEEDED
Status: DISCONTINUED | OUTPATIENT
Start: 2023-05-10 | End: 2023-05-10 | Stop reason: SURG

## 2023-05-10 RX ORDER — HYDROCODONE BITARTRATE AND ACETAMINOPHEN 7.5; 325 MG/1; MG/1
1 TABLET ORAL ONCE AS NEEDED
Status: COMPLETED | OUTPATIENT
Start: 2023-05-10 | End: 2023-05-10

## 2023-05-10 RX ORDER — LABETALOL HYDROCHLORIDE 5 MG/ML
5 INJECTION, SOLUTION INTRAVENOUS
Status: DISCONTINUED | OUTPATIENT
Start: 2023-05-10 | End: 2023-05-16 | Stop reason: HOSPADM

## 2023-05-10 RX ORDER — ONDANSETRON 4 MG/1
4 TABLET, FILM COATED ORAL EVERY 8 HOURS PRN
Qty: 20 TABLET | Refills: 0 | Status: SHIPPED | OUTPATIENT
Start: 2023-05-10

## 2023-05-10 RX ORDER — DROPERIDOL 2.5 MG/ML
0.62 INJECTION, SOLUTION INTRAMUSCULAR; INTRAVENOUS
Status: DISCONTINUED | OUTPATIENT
Start: 2023-05-10 | End: 2023-05-16 | Stop reason: HOSPADM

## 2023-05-10 RX ORDER — SODIUM CHLORIDE 0.9 % (FLUSH) 0.9 %
3 SYRINGE (ML) INJECTION EVERY 12 HOURS SCHEDULED
Status: DISCONTINUED | OUTPATIENT
Start: 2023-05-10 | End: 2023-05-10 | Stop reason: HOSPADM

## 2023-05-10 RX ORDER — CEFAZOLIN SODIUM 2 G/100ML
2 INJECTION, SOLUTION INTRAVENOUS ONCE
Status: COMPLETED | OUTPATIENT
Start: 2023-05-10 | End: 2023-05-10

## 2023-05-10 RX ORDER — LIDOCAINE HYDROCHLORIDE 20 MG/ML
INJECTION, SOLUTION EPIDURAL; INFILTRATION; INTRACAUDAL; PERINEURAL AS NEEDED
Status: DISCONTINUED | OUTPATIENT
Start: 2023-05-10 | End: 2023-05-10 | Stop reason: SURG

## 2023-05-10 RX ORDER — FAMOTIDINE 10 MG/ML
20 INJECTION, SOLUTION INTRAVENOUS ONCE
Status: COMPLETED | OUTPATIENT
Start: 2023-05-10 | End: 2023-05-10

## 2023-05-10 RX ORDER — SODIUM CHLORIDE, SODIUM LACTATE, POTASSIUM CHLORIDE, CALCIUM CHLORIDE 600; 310; 30; 20 MG/100ML; MG/100ML; MG/100ML; MG/100ML
9 INJECTION, SOLUTION INTRAVENOUS CONTINUOUS
Status: DISCONTINUED | OUTPATIENT
Start: 2023-05-10 | End: 2023-05-16 | Stop reason: HOSPADM

## 2023-05-10 RX ORDER — DIPHENHYDRAMINE HYDROCHLORIDE 50 MG/ML
12.5 INJECTION INTRAMUSCULAR; INTRAVENOUS
Status: DISCONTINUED | OUTPATIENT
Start: 2023-05-10 | End: 2023-05-16 | Stop reason: HOSPADM

## 2023-05-10 RX ORDER — SODIUM CHLORIDE, SODIUM LACTATE, POTASSIUM CHLORIDE, CALCIUM CHLORIDE 600; 310; 30; 20 MG/100ML; MG/100ML; MG/100ML; MG/100ML
100 INJECTION, SOLUTION INTRAVENOUS CONTINUOUS
Status: DISCONTINUED | OUTPATIENT
Start: 2023-05-10 | End: 2023-05-16 | Stop reason: HOSPADM

## 2023-05-10 RX ORDER — HYDROMORPHONE HYDROCHLORIDE 1 MG/ML
0.5 INJECTION, SOLUTION INTRAMUSCULAR; INTRAVENOUS; SUBCUTANEOUS
Status: DISCONTINUED | OUTPATIENT
Start: 2023-05-10 | End: 2023-05-16 | Stop reason: HOSPADM

## 2023-05-10 RX ORDER — FENTANYL CITRATE 50 UG/ML
50 INJECTION, SOLUTION INTRAMUSCULAR; INTRAVENOUS ONCE AS NEEDED
Status: COMPLETED | OUTPATIENT
Start: 2023-05-10 | End: 2023-05-10

## 2023-05-10 RX ORDER — IPRATROPIUM BROMIDE AND ALBUTEROL SULFATE 2.5; .5 MG/3ML; MG/3ML
3 SOLUTION RESPIRATORY (INHALATION) ONCE AS NEEDED
Status: DISCONTINUED | OUTPATIENT
Start: 2023-05-10 | End: 2023-05-16 | Stop reason: HOSPADM

## 2023-05-10 RX ORDER — FENTANYL CITRATE 50 UG/ML
50 INJECTION, SOLUTION INTRAMUSCULAR; INTRAVENOUS
Status: DISCONTINUED | OUTPATIENT
Start: 2023-05-10 | End: 2023-05-16 | Stop reason: HOSPADM

## 2023-05-10 RX ORDER — NALOXONE HCL 0.4 MG/ML
0.2 VIAL (ML) INJECTION AS NEEDED
Status: DISCONTINUED | OUTPATIENT
Start: 2023-05-10 | End: 2023-05-16 | Stop reason: HOSPADM

## 2023-05-10 RX ORDER — OXYCODONE HYDROCHLORIDE AND ACETAMINOPHEN 5; 325 MG/1; MG/1
1 TABLET ORAL EVERY 4 HOURS PRN
Qty: 45 TABLET | Refills: 0 | Status: SHIPPED | OUTPATIENT
Start: 2023-05-10

## 2023-05-10 RX ORDER — HYDRALAZINE HYDROCHLORIDE 20 MG/ML
5 INJECTION INTRAMUSCULAR; INTRAVENOUS
Status: DISCONTINUED | OUTPATIENT
Start: 2023-05-10 | End: 2023-05-16 | Stop reason: HOSPADM

## 2023-05-10 RX ORDER — DEXAMETHASONE SODIUM PHOSPHATE 4 MG/ML
INJECTION, SOLUTION INTRA-ARTICULAR; INTRALESIONAL; INTRAMUSCULAR; INTRAVENOUS; SOFT TISSUE
Status: COMPLETED | OUTPATIENT
Start: 2023-05-10 | End: 2023-05-10

## 2023-05-10 RX ORDER — PROMETHAZINE HYDROCHLORIDE 25 MG/1
25 SUPPOSITORY RECTAL ONCE AS NEEDED
Status: DISCONTINUED | OUTPATIENT
Start: 2023-05-10 | End: 2023-05-16 | Stop reason: HOSPADM

## 2023-05-10 RX ORDER — ROCURONIUM BROMIDE 10 MG/ML
INJECTION, SOLUTION INTRAVENOUS AS NEEDED
Status: DISCONTINUED | OUTPATIENT
Start: 2023-05-10 | End: 2023-05-10 | Stop reason: SURG

## 2023-05-10 RX ORDER — ONDANSETRON 2 MG/ML
4 INJECTION INTRAMUSCULAR; INTRAVENOUS ONCE AS NEEDED
Status: DISCONTINUED | OUTPATIENT
Start: 2023-05-10 | End: 2023-05-16 | Stop reason: HOSPADM

## 2023-05-10 RX ORDER — PROPOFOL 10 MG/ML
VIAL (ML) INTRAVENOUS AS NEEDED
Status: DISCONTINUED | OUTPATIENT
Start: 2023-05-10 | End: 2023-05-10 | Stop reason: SURG

## 2023-05-10 RX ORDER — EPHEDRINE SULFATE 50 MG/ML
5 INJECTION, SOLUTION INTRAVENOUS ONCE AS NEEDED
Status: DISCONTINUED | OUTPATIENT
Start: 2023-05-10 | End: 2023-05-16 | Stop reason: HOSPADM

## 2023-05-10 RX ORDER — EPHEDRINE SULFATE 50 MG/ML
INJECTION INTRAVENOUS AS NEEDED
Status: DISCONTINUED | OUTPATIENT
Start: 2023-05-10 | End: 2023-05-10 | Stop reason: SURG

## 2023-05-10 RX ORDER — LIDOCAINE HYDROCHLORIDE 10 MG/ML
0.5 INJECTION, SOLUTION EPIDURAL; INFILTRATION; INTRACAUDAL; PERINEURAL ONCE AS NEEDED
Status: DISCONTINUED | OUTPATIENT
Start: 2023-05-10 | End: 2023-05-10 | Stop reason: HOSPADM

## 2023-05-10 RX ADMIN — MIDAZOLAM 1 MG: 1 INJECTION INTRAMUSCULAR; INTRAVENOUS at 08:06

## 2023-05-10 RX ADMIN — SODIUM CHLORIDE, POTASSIUM CHLORIDE, SODIUM LACTATE AND CALCIUM CHLORIDE 9 ML/HR: 600; 310; 30; 20 INJECTION, SOLUTION INTRAVENOUS at 07:44

## 2023-05-10 RX ADMIN — ONDANSETRON 4 MG: 2 INJECTION INTRAMUSCULAR; INTRAVENOUS at 09:46

## 2023-05-10 RX ADMIN — EPHEDRINE SULFATE 10 MG: 50 INJECTION INTRAVENOUS at 09:38

## 2023-05-10 RX ADMIN — HYDROCODONE BITARTRATE AND ACETAMINOPHEN 1 TABLET: 7.5; 325 TABLET ORAL at 11:17

## 2023-05-10 RX ADMIN — FENTANYL CITRATE 100 MCG: 50 INJECTION, SOLUTION INTRAMUSCULAR; INTRAVENOUS at 09:06

## 2023-05-10 RX ADMIN — FAMOTIDINE 20 MG: 10 INJECTION INTRAVENOUS at 07:59

## 2023-05-10 RX ADMIN — PROPOFOL 200 MG: 10 INJECTION, EMULSION INTRAVENOUS at 09:06

## 2023-05-10 RX ADMIN — LIDOCAINE HYDROCHLORIDE 80 MG: 20 INJECTION, SOLUTION EPIDURAL; INFILTRATION; INTRACAUDAL; PERINEURAL at 09:06

## 2023-05-10 RX ADMIN — SUGAMMADEX 200 MG: 100 INJECTION, SOLUTION INTRAVENOUS at 09:46

## 2023-05-10 RX ADMIN — DEXAMETHASONE SODIUM PHOSPHATE 4 MG: 4 INJECTION, SOLUTION INTRAMUSCULAR; INTRAVENOUS at 08:25

## 2023-05-10 RX ADMIN — CEFAZOLIN SODIUM 2 G: 2 INJECTION, SOLUTION INTRAVENOUS at 08:53

## 2023-05-10 RX ADMIN — ROPIVACAINE HYDROCHLORIDE 20 ML: 5 INJECTION EPIDURAL; INFILTRATION; PERINEURAL at 08:25

## 2023-05-10 RX ADMIN — EPHEDRINE SULFATE 10 MG: 50 INJECTION INTRAVENOUS at 09:43

## 2023-05-10 RX ADMIN — FENTANYL CITRATE 50 MCG: 50 INJECTION, SOLUTION INTRAMUSCULAR; INTRAVENOUS at 08:06

## 2023-05-10 RX ADMIN — DEXAMETHASONE SODIUM PHOSPHATE 8 MG: 4 INJECTION, SOLUTION INTRAMUSCULAR; INTRAVENOUS at 09:18

## 2023-05-10 RX ADMIN — ROCURONIUM BROMIDE 50 MG: 10 INJECTION, SOLUTION INTRAVENOUS at 09:06

## 2023-05-10 NOTE — ANESTHESIA POSTPROCEDURE EVALUATION
Patient: Sadi R Graves    Procedure Summary     Date: 05/10/23 Room / Location: [facility] OR [address] [place] [address] [facility]    Anesthesia Start: 0859 Anesthesia Stop: 1003    Procedure: RIGHT SHOULDER ARTHROSCOPY, DEBRIDEMENT OF LABRUM AND ROATOR CUFF, AND DECOMPRESSION (Right: Shoulder) Diagnosis:       Superior glenoid labrum lesion of right shoulder, subsequent encounter      (Superior glenoid labrum lesion of right shoulder, subsequent encounter [S49.797D])    Surgeons: Gardenia Norris MD Provider: Saman Mckeon MD    Anesthesia Type: general ASA Status: 3          Anesthesia Type: general    Vitals  Vitals Value Taken Time   /91 05/10/23 1130   Temp 36.4 °C (97.5 °F) 05/10/23 1001   Pulse 58 05/10/23 1138   Resp 18 05/10/23 1115   SpO2 94 % 05/10/23 1138   Vitals shown include unvalidated device data.        Post Anesthesia Care and Evaluation    Patient location during evaluation: PHASE II  Patient participation: complete - patient participated  Level of consciousness: awake  Pain management: satisfactory to patient    Airway patency: patent  Anesthetic complications: No anesthetic complications  PONV Status: controlled  Cardiovascular status: acceptable  Respiratory status: acceptable  Hydration status: acceptable

## 2023-05-10 NOTE — DISCHARGE INSTRUCTIONS
What to expect after a Nerve Block    Nerve blocks administered to block pain affect many types of nerves, including those nerves that control movement, pain, and normal sensation. Following a nerve block, you may notice some bruising at the site where the block was given. You may experience sensations such as: numbness of the affected area or limb, tingling, heaviness (that is the limb feels heavy to you), weakness or inability to move the affected arm or leg, or a feeling as if your arm or leg has “fallen asleep.”     A nerve block can last from 2 to 36 hours depending on the medications used.  Usually the weakness wears off first followed by the tingling and heaviness. As the block wears off, you may begin to notice pain; however, this sequence of events may occur in any order. Typically, you will be able to move your limb before you will feel it. Once a nerve block begins to wear off, the effects are usually completely gone within 60 minutes.  If you experience continued side effects that you believe are block related for longer than 48 hours, please call your healthcare provider. Please see block-specific instructions below.    Instructions for any block involving the shoulder or arm  If you have had any kind of shoulder/arm block, you will go home with your arm in a sling. Wear the sling until the block has completely worn off. You may be required to wear it for a longer period of time per your surgeon’s recommendations.  If you have had a shoulder/arm block, it is a good idea to sleep on a recliner with pillows under your arm.    You may experience symptoms such as:  Shortness of breath  Hoarseness   Blurry vision  Unequal pupils  Drooping of your face on the same side as the block was performed    These are side effects associated with this kind of block and should go away within 12 hours.    Note: If you have severe or prolonged shortness of breath, please seek medical assistance as soon as possible.      Protection of a “blocked” arm or leg (limb)  After a nerve block, you cannot feel pain, pressure, or extremes of temperature in the affected limb. And because of this, your blocked limb is at more risk for injury. For example, it is possible to burn your limb on an extremely hot surface without feeling it.     When resting, it is important to reposition your limb periodically to avoid prolonged pressure on it. This may require the use of pillows and padding.    While sleeping, you should avoid rolling onto the affected limb or putting too much pressure on it.     If you have a cast or tight dressing, check the color of your fingers or toes of the affected limb. Call your surgeon if they look discolored (that is, dusky, dark colored).    Use caution in cold weather. Cover your limb appropriately to protect it from the cold.      Pain Management:    Your surgeon will give you a prescription for pain medication. Begin taking this before the nerve block wears off. Bear in mind that sometimes the block can wear off in the middle of the night. Pendulum Exercises for Post Op Shoulder Surgery      Lean over with your good arm supported on a table or chair.  Relax the injured arm and allow it to hang straight down at your side.  Slowly begin to swing the relaxed arm back and forth.  Then swing it side to side.  Next, move it in a [place]. Now,  reverse the direction.        Generally, you should spend about 5 minutes doing this exercise.  It should be done 2-3 times daily or as directed by your physician.

## 2023-05-10 NOTE — OP NOTE
Shoulder Scope Decompression Operative Note      Facility: [facility]  Patient Name: Sadi Reyes  YOB: 1975  Date: 5/10/2023  Medical Record Number: [id number]      Pre-op Diagnosis:   Superior glenoid labrum lesion of right shoulder, subsequent encounter [S43.431D]    Post-Op Diagnosis Codes:     * Superior glenoid labrum lesion of right shoulder, subsequent encounter [S43.431D]      Procedure(s):  RIGHT SHOULDER ARTHROSCOPY, DEBRIDEMENT OF LABRUM AND ROATOR CUFF, AND DECOMPRESSION    Surgeon(s):  Gardenia Norris MD    Anesthesia: General with Block  Anesthesiologist: Saman Mckeon MD  CRNA: Marry Burton CRNA    Staff:   Circulator: Alem Bates RN  Scrub Person: Humaira Avery  Assistant: Radha Rios  Assistants : Radha Rios first assist first assist was used throughout the case for proposition the patient on the table, position extremity during the procedure and closure      Estimated Blood Loss: 5 mL    Specimens:   * No orders in the log *     Drains: None    Findings: See Dictation    Complications: None    Indication for procedure:     This patient has had a several month history of right shoulder pain which has been unresponsive to conservative management. They have an MRI and an exam which are consistent with impingement and presents for arthroscopy. The patient understands all risks benefits and alternatives. Risks including but not exclusive to anesthetic complications including death, MI, CVA, as well as infection, bleeding, failure to relieve symptoms and need for future surgery. The patient understands these and agrees to proceed.  The head appears essentially within the glenoid the cartilage was intact on both he did have some changes in the superior and anterior labrum.  Anterior portal was established in the interval between the subscapularis and the biceps tendon with spinal needle localization direct visualization.  I gently debrided the  superior and anterior labrum with a motorized shaver and the Apollo device.  The biceps anchor was intact and stable.  He was found have a partial articular sided rotator cuff tear that was gently debrided with the motorized shaver and the Apollo device.  Subscapularis was normal      Description of procedure:       Patient was taken to the operating room. They were placed supine on the operating room table. After induction of adequate LMA anesthesia, scalene nerve block and IV antibiotics, they underwent exam under anesthesia which demonstrated  full range of motion which was symmetric side to side. They were placed in the modified beachchair position all prominent areas well padded and head well stabilized. The arm was prepped and draped in the usual sterile fashion. Bony landmarks were demarcated and the joint was infiltrated with 30 mL of fluid. Standard posterior portal was made inferior and medial to the posterior H acromion with an 11 blade. Blunt trocar penetrated into the joint, scope followed and our evaluation began.         I then exited the space and entered the subacromial space. I made an accessory lateral portal off the anterior lateral edge of the acromion placed the shaver and removed marked amount of thickened bursa. I delineated the anterior lateral edge of the acromion with the device and resected the large spur that was present. The rotator cuff was normal from this vantage point. Everything was thoroughly irrigated, it was suctioned, the portals were closed with 3-0 nylon in interrupted fashion. Sterile dressings and a sling were applied. The patient tolerated this well, was taken to recovery room in good condition.  All sponge and needle counts were correct.      Date: 5/10/2023  Time: 10:03 EDT

## 2023-05-10 NOTE — ANESTHESIA PROCEDURE NOTES
Peripheral Block      Patient reassessed immediately prior to procedure    Patient location during procedure: pre-op  Start time: 5/10/2023 8:25 AM  Stop time: 5/10/2023 8:30 AM  Reason for block: procedure for pain, at surgeon's request and post-op pain management  Performed by  Anesthesiologist: Saman Mckeon MD  Preanesthetic Checklist  Completed: patient identified, IV checked, site marked, risks and benefits discussed, surgical consent, monitors and equipment checked, pre-op evaluation and timeout performed  Prep:  Pt Position: supine  Sterile barriers:cap, gloves, sterile barriers, washed/disinfected hands and mask  Prep: ChloraPrep  Patient monitoring: blood pressure monitoring, continuous pulse oximetry and EKG  Procedure    Sedation: yes  Performed under: local infiltration  Guidance:ultrasound guided  Images:still images obtained, printed/placed on chart    Laterality:right  Block Type:interscalene  Injection Technique:single-shot  Needle Type:echogenic  Resistance on Injection: none    Medications Used: dexamethasone (DECADRON) injection - Injection   4 mg - 5/10/2023 8:25:00 AM  ropivacaine (NAROPIN) 0.5 % injection - Injection   20 mL - 5/10/2023 8:25:00 AM      Post Assessment  Injection Assessment: negative aspiration for heme, no paresthesia on injection and incremental injection  Patient Tolerance:comfortable throughout block  Complications:no  Additional Notes  USG used to verify needle placement and medication administration

## 2023-05-10 NOTE — ANESTHESIA PROCEDURE NOTES
Airway  Urgency: elective    Date/Time: 5/10/2023 9:06 AM  Airway not difficult    General Information and Staff    Patient location during procedure: OR  Anesthesiologist: Saman Mckeon MD  CRNA/CAA: Marry Burton CRNA    Indications and Patient Condition  Indications for airway management: airway protection    Preoxygenated: yes  MILS not maintained throughout  Mask difficulty assessment: 1 - vent by mask    Final Airway Details  Final airway type: endotracheal airway      Successful airway: ETT  Cuffed: yes   Successful intubation technique: direct laryngoscopy  Facilitating devices/methods: intubating stylet  Endotracheal tube insertion site: oral  Blade: Sherrill  Blade size: 3  ETT size (mm): 8.0  Cormack-Lehane Classification: grade I - full view of glottis  Placement verified by: chest auscultation   Cuff volume (mL): 9  Measured from: lips  Number of attempts at approach: 1  Assessment: lips, teeth, and gum same as pre-op and atraumatic intubation    Additional Comments  PreO2 100% face mask, IV induction, easy mask, DVL x1, cords noted, tube through, cuff up, EBBSH, +etCO2, = chest movement, tube secured in place, atraumatic, teeth and lips intact as preop.

## 2023-05-10 NOTE — H&P
History & Physical       Patient: Sadi Reyes    Date of Admission: 5/10/2023  6:45 AM    YOB: 1975    Medical Record Number: [id number]    Attending Physician: Gardenia Norris MD        Chief Complaints: Superior glenoid labrum lesion of right shoulder, subsequent encounter [S42.993W]      History of Present Illness: This patient has several month history of right shoulder pain she has failed conservative management with aggressive physical therapy and injections she has an exam and a history which are consistent with superior labral pathology.  We did do a CT arthrogram which was normal.  Again, she has failed conservative management presents for arthroscopy she understands at the time of surgery we would do what was deemed necessary.  That could include debridement, labral repair, biceps tenodesis or tenotomy debridement of labrum and rotator cuff repair which I think is unlikely and decompression     Allergies:   Allergies   Allergen Reactions   • Inderal La [Propranolol] Diarrhea and Nausea And Vomiting       Medications:   Home Medications:  No current facility-administered medications on file prior to encounter.     Current Outpatient Medications on File Prior to Encounter   Medication Sig   • amLODIPine (NORVASC) 10 MG tablet Take 1 tablet by mouth Daily.   • atorvastatin (LIPITOR) 20 MG tablet TAKE 1 TABLET BY MOUTH EVERY DAY   • escitalopram (Lexapro) 10 MG tablet Take 1 tablet by mouth Daily.   • esomeprazole (nexIUM) 40 MG capsule Take 1 capsule by mouth 2 (Two) Times a Day Before Meals.     Current Medications:  Scheduled Meds:  Continuous Infusions:No current facility-administered medications for this encounter.    PRN Meds:.    Past Medical History:   Diagnosis Date   • Acute nephritis    • Acute pyelonephritis    • VANESSA (acute kidney injury)    • Carpal tunnel syndrome    • Chronic kidney disease     STAGE 3   • Cluster headache    • GERD (gastroesophageal reflux disease)    •  Headache, tension-type    • Hyperlipidemia    • Hypertension    • Migraine    • Palpitations    • Right shoulder pain    • Spinal headache    • Syncope         Past Surgical History:   Procedure Laterality Date   • BUNIONECTOMY Right     X3   • COLONOSCOPY N/A 11/17/2022    Procedure: COLONOSCOPY TO CECUM AND TERMINAL ILEUM;  Surgeon: Tono Romero MD;  Location: [facility] ENDOSCOPY;  Service: Gastroenterology;  Laterality: N/A;  PRE- SCREENING  POST- HEMORRHOIDS   • ENDOSCOPY N/A 11/02/2020    Procedure: ESOPHAGOGASTRODUODENOSCOPY with biopsies;  Surgeon: Tono Romero MD;  Location: [facility] ENDOSCOPY;  Service: Gastroenterology;  Laterality: N/A;  Pre: epigastric pain  Post: duodenitis, gastritis, hiatal hernia   • EPIDURAL Right 03/30/2023    Procedure: Right S1 LUMBAR/SACRAL TRANSFORAMINAL EPIDURAL 62358;  Surgeon: Andre Wilson MD;  Location: [facility] OR;  Service: Pain Management;  Laterality: Right;   • HERNIA REPAIR          Social History     Occupational History   • Not on file   Tobacco Use   • Smoking status: Never   • Smokeless tobacco: Never   • Tobacco comments:     rare caffeine   Vaping Use   • Vaping Use: Never used   Substance and Sexual Activity   • Alcohol use: Yes     Comment: Twice a month   • Drug use: Yes     Frequency: 1.0 times per week     Types: Marijuana     Comment: uses daily   • Sexual activity: Yes     Partners: Female      Social History     Social History Narrative   • Not on file        Family History   Problem Relation Age of Onset   • Hypertension Mother    • No Known Problems Father    • No Known Problems Sister    • Prostate cancer Maternal Grandfather    • Malig Hyperthermia Neg Hx        Review of Systems      Physical Exam: 47 y.o. male  General Appearance:    Alert, cooperative, in no acute distress                    There were no vitals filed for this visit.     Head:  Normocephalic, without obvious abnormality, atraumatic   Eyes:          Conjunctivae and  sclerae normal, no pallor, corneas clear,    Ears:  Ears appear intact with no abnormalities noted   Throat: No oral lesions, no thrush, oral mucosa moist   Neck: No adenopathy, supple, trachea midline, no thyromegaly,    Back:   No kyphosis present, no scoliosis present, no skin lesions,      erythema or scars, no tenderness to percussion or                   palpation,range of motion normal   Lungs:   C respirations regular, even and                 unlabored    Heart:  Regular rhythm and normal rate               Chest Wall:  No abnormalities observed               Pulses: Pulses palpable and equal bilaterally   Skin: No bleeding, bruising or rash   Lymph nodes: No palpable adenopathy   Neurologic: Appears neurologic intact             Assessment:    Superior glenoid labrum lesion of right shoulder          Plan: All risks, benefits and alternatives were discussed.  Risks including but not exclusive to anesthetic complications, including death, MI, CVA, infection, bleeding DVT, PE,  fracture, residual pain and need for future surgery.  Patient understood all and agrees to proceed.

## 2023-05-10 NOTE — ANESTHESIA PREPROCEDURE EVALUATION
Anesthesia Evaluation     NPO Solid Status: > 8 hours  NPO Liquid Status: > 8 hours           Airway   Mallampati: I  TM distance: >3 FB  No difficulty expected  Dental      Pulmonary    Cardiovascular     (+) hypertension, hyperlipidemia,       Neuro/Psych  (+) headaches, syncope, numbness,    GI/Hepatic/Renal/Endo    (+)  GERD,  renal disease,     Musculoskeletal     Abdominal    Substance History      OB/GYN          Other                        Anesthesia Plan    ASA 3     general     (Regional for [facility]  )  intravenous induction     Anesthetic plan, risks, benefits, and alternatives have been provided, discussed and informed consent has been obtained with: patient.    Plan discussed with CRNA.        CODE STATUS:

## 2023-05-17 ENCOUNTER — TRANSCRIBE ORDERS (OUTPATIENT)
Dept: SURGERY | Facility: SURGERY CENTER | Age: 48
End: 2023-05-17
Payer: COMMERCIAL

## 2023-05-17 DIAGNOSIS — Z41.9 SURGERY, ELECTIVE: Primary | ICD-10-CM

## 2023-05-23 ENCOUNTER — OFFICE VISIT (OUTPATIENT)
Dept: ORTHOPEDIC SURGERY | Facility: CLINIC | Age: 48
End: 2023-05-23
Payer: OTHER MISCELLANEOUS

## 2023-05-23 VITALS — HEIGHT: 71 IN | WEIGHT: 180 LBS | TEMPERATURE: 97.8 F | BODY MASS INDEX: 25.2 KG/M2

## 2023-05-23 DIAGNOSIS — Z98.890 S/P ARTHROSCOPY OF SHOULDER: Primary | ICD-10-CM

## 2023-05-23 NOTE — LETTER
May 23, 2023     Patient: Sadi Reyes   YOB: 1975   Date of Visit: 5/23/2023       To Whom It May Concern:    It is my medical opinion that Sadipamela Reyes  will remain off work.           Sincerely,        Gardenia Norris MD    CC:   No Recipients

## 2023-05-23 NOTE — PROGRESS NOTES
Shoulder Scope follow Up 1st Visit      Patient: Sadi Reyes        YOB: 1975      Chief Complaints: shoulder pain right      History of Present Illness: Pt is here f/u shoulder arthroscopy right shoulder he states he is doing well he is off his pain medicine progressing his activities but still minding his restrictions        Allergies:   Allergies   Allergen Reactions   • Inderal La [Propranolol] Diarrhea and Nausea And Vomiting       Medications:   Home Medications:  Current Outpatient Medications on File Prior to Visit   Medication Sig   • acetaminophen (TYLENOL) 500 MG tablet Take 1 tablet by mouth Every 6 (Six) Hours As Needed for Mild Pain.   • amLODIPine (NORVASC) 10 MG tablet Take 1 tablet by mouth Daily.   • atorvastatin (LIPITOR) 20 MG tablet TAKE 1 TABLET BY MOUTH EVERY DAY   • escitalopram (Lexapro) 10 MG tablet Take 1 tablet by mouth Daily.   • esomeprazole (nexIUM) 40 MG capsule Take 1 capsule by mouth 2 (Two) Times a Day Before Meals.   • ondansetron (Zofran) 4 MG tablet Take 1 tablet by mouth Every 8 (Eight) Hours As Needed for Nausea or Vomiting.   • oxyCODONE-acetaminophen (PERCOCET) 5-325 MG per tablet Take 1 tablet by mouth Every 4 (Four) Hours As Needed for Severe Pain.     No current facility-administered medications on file prior to visit.     Current Medications:  Scheduled Meds:  Continuous Infusions:No current facility-administered medications for this visit.    PRN Meds:.          Physical Exam: 47 y.o. male  General Appearance:    Alert, cooperative, in no acute distress                 There were no vitals filed for this visit.   Patient is alert and oriented ×3 no acute distress normal mood physical exam.  Physical exam of the shoulder, incisions looked good there is no erythema,no signs or sx of infection.    Assessment  S/P shoulder scope.  I did review intraoperative findings and arthroscopic pictures with the patient.          Plan: To remove sutures today place  Steri-Strips and start into  physical therapy and I will have thrm follow up in 4 weeks.  I will keep him off work.  He does a labor-intensive job and will take some physical therapy which we will start today working on range of motion and strengthening ultimately endurance before we can get him back to that level of a job I will see him back in 4 weeks it may be another 2 to 4 weeks after that before we can get him back              Answers for HPI/ROS submitted by the patient on 5/16/2023  What is the primary reason for your visit?: Other  Please describe your symptoms.: Follow up fron surgery  Have you had these symptoms before?: No  How long have you been having these symptoms?: 1-2 weeks

## 2023-05-31 ENCOUNTER — TELEPHONE (OUTPATIENT)
Dept: PHYSICAL THERAPY | Facility: CLINIC | Age: 48
End: 2023-05-31

## 2023-05-31 ENCOUNTER — TELEPHONE (OUTPATIENT)
Dept: ORTHOPEDIC SURGERY | Facility: CLINIC | Age: 48
End: 2023-05-31

## 2023-05-31 NOTE — TELEPHONE ENCOUNTER
Caller: NATALYA     Relationship: [employer]      Best call back number:     What form or medical record are you requesting: OFFICE NOTES AND WORK STATUS FROM 5/23/23 VISIT     How would you like to receive the form or medical records (pick-up, mail, fax): FAX  If fax, what is the fax number: [phone]

## 2023-05-31 NOTE — TELEPHONE ENCOUNTER
CALLED PATIENT IN REGARDS TO MISSING TODAYS APPOINTMENT. HE SAID HE THOUGHT IT WAS AT 3:30 TODAY. RESCHEDULED PATIENT TO TOMORROW. ARRIVAL TIME 11:15.

## 2023-06-01 ENCOUNTER — TREATMENT (OUTPATIENT)
Dept: PHYSICAL THERAPY | Facility: CLINIC | Age: 48
End: 2023-06-01

## 2023-06-01 DIAGNOSIS — M25.511 ACUTE PAIN OF RIGHT SHOULDER: ICD-10-CM

## 2023-06-01 DIAGNOSIS — R29.898 DECREASED STRENGTH OF UPPER EXTREMITY: ICD-10-CM

## 2023-06-01 DIAGNOSIS — M25.611 DECREASED RANGE OF MOTION OF RIGHT SHOULDER: ICD-10-CM

## 2023-06-01 DIAGNOSIS — Z98.890 S/P ARTHROSCOPY OF RIGHT SHOULDER: Primary | ICD-10-CM

## 2023-06-01 NOTE — PROGRESS NOTES
"  Physical Therapy Initial Evaluation and Plan of Care                                               [address] [facility], [address]                                                           [place], [place]                                                         [phone]    Patient: Sadi Reyes   : 1975  Diagnosis/ICD-10 Code:  S/P arthroscopy of right shoulder [Z98.890]  Referring practitioner: Gardenia Norris MD  Date of Initial Visit: 2023  Today's Date: 2023  Patient seen for 1 sessions           Subjective Questionnaire: QuickDASH: 56.82      Subjective Evaluation    History of Present Illness  Date of surgery: 5/10/2023  Mechanism of injury: Pt reports chronic shoulder pain beginning in September when he was pushing a 300-400 pound refrigerator at work and felt immediate pain that was unable to be alleviated with conservative treatment. He has been off work since . After consultation with Dr. Gardenia Norris, it was decided to move forward with R shoulder arthroscopy on 5/10/23. Following his procedure he states that he has not experienced any alleviation of symptoms and currently has pain with all movement:describes the sensation as \"it feels like it pops out of place.\" Pt states that his sleep is also impaired because he cannot lie on his R shoulder and it will sometimes ache at night. He currently has no pain at rest and keeps his R shoulder in his sling when out in the community.   Pt reports that prior to sx he was experiencing numbness and tingling down his R arm into his hand and this has now subsided. He has a continued sensation of his R arm \"locking up\" on him.       Patient Occupation: [employer]   Precautions and Work Restrictions: No use of R arm per pt report; states that he no longer has to wear the sling at homePain  Current pain ratin  At best pain ratin  At worst pain ratin  Location: R shoulder  Quality: sharp, discomfort, pulling and dull ache  Relieving " factors: medications, ice and support  Aggravating factors: movement and sleeping  Progression: no change    Hand dominance: right    Diagnostic Tests  CT scan: abnormal    Treatments  Previous treatment: physical therapy  Patient Goals  Patient goals for therapy: return to work, independence with ADLs/IADLs, increased strength, increased motion, decreased pain and decreased edema  Patient goal: Coaching basketball and baseball; throwing a ball, catching a ball           Objective          Palpation     Right Tenderness of the biceps and triceps.     Neurological Testing     Sensation     Shoulder   Left Shoulder   Intact: light touch    Right Shoulder   Intact: light touch    Active Range of Motion   Left Shoulder   Normal active range of motion    Right Shoulder   Flexion: 60 degrees with pain  Abduction: 78 degrees with pain  External rotation 0°: 40 degrees with pain    Strength/Myotome Testing     Left Shoulder   Normal muscle strength    Planes of Motion   Flexion: 5   Abduction: 5   External rotation at 0°: 4+   Internal rotation at 0°: 4+           Assessment & Plan     Assessment  Impairments: abnormal or restricted ROM, activity intolerance, impaired physical strength, lacks appropriate home exercise program, pain with function and weight-bearing intolerance  Functional Limitations: carrying objects, lifting, sleeping, pulling, pushing, uncomfortable because of pain, moving in bed, reaching behind back, reaching overhead and unable to perform repetitive tasks  Assessment details: Sadi is a 48 y/o male reporting to OP PT for initial evaluation s/p R shoulder arthroscopy, debridement of labrum and rotator cuff, and decompression performed by Dr. Gardenia Norris on 5/10/ 23. Pt has a months long history of R shoulder pain following an injury at work in which he was attempting to pull a 200-300 pound fridge and felt immediate and sharp pain in his R shoulder that has prevented him from performing his work  duties since January of this year. As conservative treatment was unsuccessful, the surgical route was taken. Since his procedure, Sadi reports that he continues to experience significant R shoulder pain with any and all movement, even his shoulder pendulums. He is currently wearing his sling at all times, only removing it when he is at home resting for a long period of time. Pt was educated on benefits of as much movement as possible following his procedure in order to help the healing process. Upon evaluation, Sadi presents with deficits in R shoulder AROM and strength and demonstrated difficulty de-coupling his scapular and shoulder movements. Skilled OP PT is deemed reasonable and necessary in order to address these deficits, limitations, and impairments which are preventing him from performing ADLs and work duties independently.   Prognosis: good    Goals  Plan Goals: Short Term Goals: 4 weeks  1. Pt will be independent with initial HEP  2. Pt will report >/= 50% decrease in R shoulder pain with active movement  3. Pt will perform shoulder pendulum exercise without reports of pain for indication of appropriate healing and improved shoulder stability  4. Pt will demonstrate improved R shoulder AROM flexion and abduction to 90* for indication of improved ability to perform all ADLs and work duties  5. Pt will demonstrate improved R shoulder strength through MMT scores of 3+/5     Long Term Goals: 12 weeks  1. Pt will be independent with progressed HEP  2. Pt will report >/= 90% decrease in R shoulder pain during all active movement  3. Pt will demonstrate improved R shoulder AROM flexion and abduction to 140* for indication of improved ability to perform all ADLs and work duties  4. Pt will demonstrate improved R shoulder strength through MMT scores of >/= 4+/5 for indication of ability to perform all ADLs and work duties independently and safely without reports of pain  5. 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    Plan  Therapy options: will be seen for skilled therapy services  Planned modality interventions: cryotherapy and electrical stimulation/Russian stimulation  Planned therapy interventions: balance/weight-bearing training, functional ROM exercises, home exercise program, therapeutic activities, stretching, strengthening, neuromuscular re-education and manual therapy  Frequency: 2x week  Duration in weeks: 12  Treatment plan discussed with: patient        Manual Therapy:    0     mins  47099;  Therapeutic Exercise:    10     mins  06657;     Neuromuscular Melvin:    0    mins  14683;    Therapeutic Activity:     10     mins  40844;     Gait Trainin     mins  08378;     Ultrasound:     0     mins  74988;    Electrical Stimulation:    0     mins  36601 ( );  Dry Needling     0     mins self-pay    Timed Treatment:   20   mins   Total Treatment:     38   mins    PT SIGNATURE: Biju Mora PT, DPT  [place] License #: [id number]   Biju Mora PT, 23, 11:26 AM EDT  DATE TREATMENT INITIATED: 2023    Initial Certification  Certification Period: 2023  I certify that the therapy services are furnished while this patient is under my care.  The services outlined above are required by this patient, and will be reviewed every 90 days.     PHYSICIAN: Gardenia Norris MD      DATE:     Please sign and return via fax to [phone].. Thank you, [facility].

## 2023-06-06 ENCOUNTER — TREATMENT (OUTPATIENT)
Dept: PHYSICAL THERAPY | Facility: CLINIC | Age: 48
End: 2023-06-06
Payer: OTHER MISCELLANEOUS

## 2023-06-06 DIAGNOSIS — R29.898 DECREASED STRENGTH OF UPPER EXTREMITY: ICD-10-CM

## 2023-06-06 DIAGNOSIS — M25.511 ACUTE PAIN OF RIGHT SHOULDER: ICD-10-CM

## 2023-06-06 DIAGNOSIS — Z98.890 S/P ARTHROSCOPY OF RIGHT SHOULDER: Primary | ICD-10-CM

## 2023-06-06 DIAGNOSIS — M25.611 DECREASED RANGE OF MOTION OF RIGHT SHOULDER: ICD-10-CM

## 2023-06-06 NOTE — PROGRESS NOTES
"   Physical Therapy Daily Progress Note                                            [address], [address]                                                           [place], [place]                                                         [phone]    Patient: Sadi Reyes   : 1975  Diagnosis/ICD-10 Code:  S/P arthroscopy of right shoulder [Z98.890]  Referring practitioner: Gardenia Norris MD  Date of Initial Visit: Type: THERAPY  Noted: 2023  Today's Date: 2023  Patient seen for 2 sessions           Subjective Evaluation    History of Present Illness    Subjective comment: Pt reports that he has been completing his HEP and believes it to be helping already. He has continued reports of \"feeling like his shoulder is popping out of place and popping back in\" every time he reaches down toward the floor to pick something up.     Objective   See Exercise, Manual, and Modality Logs for complete treatment.       Assessment & Plan     Assessment    Assessment details: Tin demonstrated improved R shoulder AROM and AAROM this session in all planes of motion, though did report increased tightness and soreness at his current end-range. He was able to demonstrate R shoulder abduction AAROM to slightly above 90* and R shoulder flexion to approximately 100* AROM. Isometric strengthening was initiated this session and Tin reported slight pain with R shoulder external rotation, though stated it was manageable. He was encouraged to continue his great compliance to his HEP and we will plan to progress all ROM and strengthening as tolerated next session.          Progress per Plan of Care           Manual Therapy:    0     mins  27378;  Therapeutic Exercise:    20     mins  31820;     Neuromuscular Melvin:    19    mins  68496;    Therapeutic Activity:     0     mins  36998;     Gait Trainin     mins  95199;     Ultrasound:     0     mins  93615;    Electrical Stimulation:    0     mins  " 19063 ( );  Dry Needling     0     mins self-pay    Timed Treatment:   39   mins   Total Treatment:     39   mins    Biju Mora PT, DPT  Physical Therapist  [place] License #: [id number]  Biju Mora PT, 06/06/23, 11:05 AM EDT

## 2023-06-08 ENCOUNTER — TREATMENT (OUTPATIENT)
Dept: PHYSICAL THERAPY | Facility: CLINIC | Age: 48
End: 2023-06-08
Payer: OTHER MISCELLANEOUS

## 2023-06-08 DIAGNOSIS — R29.898 DECREASED STRENGTH OF UPPER EXTREMITY: ICD-10-CM

## 2023-06-08 DIAGNOSIS — M25.611 DECREASED RANGE OF MOTION OF RIGHT SHOULDER: ICD-10-CM

## 2023-06-08 DIAGNOSIS — M25.511 ACUTE PAIN OF RIGHT SHOULDER: ICD-10-CM

## 2023-06-08 DIAGNOSIS — Z98.890 S/P ARTHROSCOPY OF RIGHT SHOULDER: Primary | ICD-10-CM

## 2023-06-08 NOTE — PROGRESS NOTES
Physical Therapy Daily Progress Note                                            [address], [address]                                                           [place], [place]                                                         [phone]    Patient: Sadi Reyes   : 1975  Diagnosis/ICD-10 Code:  S/P arthroscopy of right shoulder [Z98.890]  Referring practitioner: Gardenia Norris MD  Date of Initial Visit: Type: THERAPY  Noted: 2023  Today's Date: 2023  Patient seen for 3 sessions           Subjective Evaluation    History of Present Illness    Subjective comment: Feeling more sore today and has felt slight increase in soreness since last treatment session.     Objective   See Exercise, Manual, and Modality Logs for complete treatment.       Assessment & Plan     Assessment    Assessment details: Tin continues to demonstrate improvement in AAROM in all planes of motion with each session. Additional scapulothoracic strengthening was added today to begin strengthening his R serratus anterior, and he was able to tolerate the exercise with decreased ROM due to pain felt at end-range in the first repetition. Tin had intermittent complaints of pain in his anterior R shoulder, indicating persisting instability due to tightness and strength deficits.       Progress per Plan of Care           Manual Therapy:    0     mins  62249;  Therapeutic Exercise:    21     mins  18307;     Neuromuscular Melvin:    10    mins  00586;    Therapeutic Activity:     0     mins  89868;     Gait Trainin     mins  71141;     Ultrasound:     0     mins  84782;    Electrical Stimulation:    0     mins  90596 ( );  Dry Needling     0     mins self-pay    Timed Treatment:   31   mins   Total Treatment:     31   mins    Biju Mora PT, DPT  Physical Therapist  [place] License #: [id number]  Biju Mora PT, 23, 10:06 AM EDT

## 2023-06-13 ENCOUNTER — TREATMENT (OUTPATIENT)
Dept: PHYSICAL THERAPY | Facility: CLINIC | Age: 48
End: 2023-06-13
Payer: OTHER MISCELLANEOUS

## 2023-06-13 DIAGNOSIS — M25.611 DECREASED RANGE OF MOTION OF RIGHT SHOULDER: ICD-10-CM

## 2023-06-13 DIAGNOSIS — M25.511 ACUTE PAIN OF RIGHT SHOULDER: ICD-10-CM

## 2023-06-13 DIAGNOSIS — Z98.890 S/P ARTHROSCOPY OF RIGHT SHOULDER: Primary | ICD-10-CM

## 2023-06-13 DIAGNOSIS — R29.898 DECREASED STRENGTH OF UPPER EXTREMITY: ICD-10-CM

## 2023-06-13 NOTE — PROGRESS NOTES
"   Physical Therapy Daily Progress Note                                            [address] [facility] [address] [facility], [address]                                                           [place], [place]                                                         [phone]    Patient: Sadi Reyes   : 1975  Diagnosis/ICD-10 Code:  S/P arthroscopy of right shoulder [Z98.890]  Referring practitioner: Gardenia Norris MD  Date of Initial Visit: Type: THERAPY  Noted: 2023  Today's Date: 2023  Patient seen for 4 sessions           Subjective Evaluation    History of Present Illness    Subjective comment: Tin reports that he continues to wear his sling when in public to protect his shoulder from harm and that he notices tighntess/soreness in his elbow after wearing it for a long time. He is still experiencing pain and soreness in his R shoulder though feels his motion is improving     Objective   See Exercise, Manual, and Modality Logs for complete treatment.       Assessment & Plan     Assessment    Assessment details: Tin continues to demonstrate improvement in R shoulder A/AAROM in all planes of motion with each treatment session. He was able to perform all isometric strengthening with reduced rest breaks this session and had decreased reports of pain, indicating improvement in R shoulder strength as well. Following his session, he stated that he \"felt like he has been lifting weights,\" due to poor muscular endurance. Plan to continue progressing all strengthening and AROM next session as tolerated.       Progress per Plan of Care           Manual Therapy:    0     mins  79201;  Therapeutic Exercise:    10     mins  50954;     Neuromuscular Melvin:    29    mins  99333;    Therapeutic Activity:     0     mins  35210;     Gait Trainin     mins  80612;     Ultrasound:     0     mins  02560;    Electrical Stimulation:    0     mins  88255 ( );  Dry Needling     0     mins self-pay    Timed " Treatment:   39   mins   Total Treatment:     39   mins    Biju Mora PT, DPT  Physical Therapist  [place] License #: [id number]  Biju Mora PT, 06/13/23, 11:05 AM EDT

## 2023-06-15 ENCOUNTER — TELEPHONE (OUTPATIENT)
Dept: PHYSICAL THERAPY | Facility: OTHER | Age: 48
End: 2023-06-15
Payer: COMMERCIAL

## 2023-06-15 NOTE — TELEPHONE ENCOUNTER
Caller: Sadi Reyes    Relationship: Self    What was the call regarding: PATIENT CANCELLED TODAYS APPT DUE TO FAMILY EMERGENCY

## 2023-06-19 ENCOUNTER — TELEPHONE (OUTPATIENT)
Dept: PAIN MEDICINE | Facility: CLINIC | Age: 48
End: 2023-06-19

## 2023-06-19 NOTE — TELEPHONE ENCOUNTER
Caller: Sadi Reyes    Relationship to patient: Self    Best call back number: [phone]    Type of visit: LUMBAR EPIDURAL      Additional notes: PATIENT SAW SIOBHAN PETERSON AND THEY DISCUSSED CANCELLING PROCEDURE FOR NOW. PATIENT WILL CALL BACK IF THIS NEEDS TO BE RESCHEDULED. PATIENT HAS NOT HAD PAIN RECENTLY, SINCE LAST INJECTION

## 2023-06-20 ENCOUNTER — TELEPHONE (OUTPATIENT)
Dept: ORTHOPEDIC SURGERY | Facility: CLINIC | Age: 48
End: 2023-06-20

## 2023-06-20 NOTE — TELEPHONE ENCOUNTER
Caller: ROXANA     Relationship: WORK COMP [employer]J     Best call back number: [phone]    What form or medical record are you requesting: OFFICE NOTES 06/20/23    Who is requesting this form or medical record from you: WORK COMP     How would you like to receive the form or medical records (pick-up, mail, fax): FAX  If fax, what is the fax number: [phone]  Timeframe paperwork needed: ASAP     Additional notes: N/A         advice

## 2023-07-21 NOTE — PROGRESS NOTES
Shoulder Scope Follow Up       Patient: Sadi Reyes        YOB: 1975      Chief Complaints: shoulder pain right      History of Present Illness: Pt is here f/u shoulder arthroscopy right shoulder with debridement of the rotator cuff he states he is getting better he still has occasional positions that bother him he is working with therapy        Allergies:   Allergies   Allergen Reactions    Inderal La [Propranolol] Diarrhea and Nausea And Vomiting       Medications:   Home Medications:  Current Outpatient Medications on File Prior to Visit   Medication Sig    acetaminophen (TYLENOL) 500 MG tablet Take 1 tablet by mouth Every 6 (Six) Hours As Needed for Mild Pain.    amLODIPine (NORVASC) 10 MG tablet Take 1 tablet by mouth Daily.    atorvastatin (LIPITOR) 20 MG tablet TAKE 1 TABLET BY MOUTH EVERY DAY    escitalopram (Lexapro) 10 MG tablet Take 1 tablet by mouth Daily.    esomeprazole (nexIUM) 40 MG capsule Take 1 capsule by mouth 2 (Two) Times a Day Before Meals. (Patient not taking: Reported on 6/20/2023)    ondansetron (Zofran) 4 MG tablet Take 1 tablet by mouth Every 8 (Eight) Hours As Needed for Nausea or Vomiting.    oxyCODONE-acetaminophen (PERCOCET) 5-325 MG per tablet Take 1 tablet by mouth Every 4 (Four) Hours As Needed for Severe Pain. (Patient not taking: Reported on 6/20/2023)     No current facility-administered medications on file prior to visit.     Current Medications:  Scheduled Meds:  Continuous Infusions:No current facility-administered medications for this visit.    PRN Meds:.          Physical Exam: 47 y.o. male  General Appearance:    Alert, cooperative, in no acute distress                 There were no vitals filed for this visit.   Patient is alert and oriented ×3 no acute distress normal mood physical exam.  Physical exam of the shoulder, incisions looked good there is no erythema,no signs or sx of infection.  Physical exam of the right shoulder reveals no overlying skin  changes no lymphedema no lymphadenopathy.  Patient has active flexion 180 with mild symptoms abduction is similar external rotation is to 50 and internal rotation to the upper lumbar spine with mild symptoms.  Patient has good rotator cuff strength 4+ over 5 with isometric strength testing with pain.  Patient has a positive impingement and a positive Pack sign.  Patient has good cervical range of motion which is full and asymptomatic no radicular symptoms.  Patient has a normal elbow exam.  Good distal pulses are present  Patient has pain with overhead activity and a positive Neer sign and a positive empty can sign , a positive drop arm and a definitive painful arc   Assessment  S/P shoulder scope.  I think he is continuing to improve he is working on his postural muscles his rotator cuff strength I do not think he is ready to return to work.  We will keep him off work talked about an injection he understands this is an option he wants to hold off on that keep that in his back pocket which I think is reasonable I will see him back in 1 month if he chooses to do the injection sooner I can work him in sooner        Plan: Continue Physical Therapy.  We will keep him off work, plan is as above

## 2023-07-24 ENCOUNTER — OFFICE VISIT (OUTPATIENT)
Dept: ORTHOPEDIC SURGERY | Facility: CLINIC | Age: 48
End: 2023-07-24
Payer: OTHER MISCELLANEOUS

## 2023-07-24 VITALS — HEIGHT: 71 IN | TEMPERATURE: 97.5 F | WEIGHT: 184.7 LBS | BODY MASS INDEX: 25.86 KG/M2

## 2023-07-24 DIAGNOSIS — Z98.890 S/P ARTHROSCOPY OF SHOULDER: Primary | ICD-10-CM

## 2023-07-24 NOTE — LETTER
July 24, 2023     Patient: Sadi Reyes   YOB: 1975   Date of Visit: 7/24/2023       To Whom It May Concern:    It is my medical opinion that Sadi Reyes will continue to remain off work.           Sincerely,        Gardenia Norris MD    CC:   No Recipients

## 2023-07-25 ENCOUNTER — OFFICE VISIT (OUTPATIENT)
Dept: PAIN MEDICINE | Facility: CLINIC | Age: 48
End: 2023-07-25
Payer: COMMERCIAL

## 2023-07-25 VITALS
OXYGEN SATURATION: 97 % | DIASTOLIC BLOOD PRESSURE: 88 MMHG | SYSTOLIC BLOOD PRESSURE: 142 MMHG | WEIGHT: 186.4 LBS | TEMPERATURE: 98.2 F | RESPIRATION RATE: 18 BRPM | HEIGHT: 71 IN | BODY MASS INDEX: 26.1 KG/M2 | HEART RATE: 67 BPM

## 2023-07-25 DIAGNOSIS — M51.16 LUMBAR DISC HERNIATION WITH RADICULOPATHY: Primary | ICD-10-CM

## 2023-07-25 PROCEDURE — 99212 OFFICE O/P EST SF 10 MIN: CPT | Performed by: NURSE PRACTITIONER

## 2023-07-25 NOTE — PROGRESS NOTES
CHIEF COMPLAINT  Follow-up for back pain.    Subjective   Sadi Reyes is a 47 y.o. male  who presents for follow-up.  He has a history of back pain. Patient was last evaluated by myself on 4/28/2023.  At that time plan was to proceed with LESI at L5/S1 right of midline.  He canceled his procedure due to not having any pain at that time.    Today his pain is 0/10VAS in severity. He states that about 1 month after his epidural his pain went away.     Procedure list:  3/30/2023-right S1 TFESI-50% relief x2 days     Back Pain  The current episode started more than 1 month ago. The problem occurs constantly. Progression since onset: improved since last office visit. The pain is present in the lumbar spine. The quality of the pain is described as aching and burning (numbing). The pain does not radiate. The pain is at a severity of 0/10. The symptoms are aggravated by standing, sitting, bending and twisting (walking). Pertinent negatives include no abdominal pain, chest pain, dysuria, fever, headaches, numbness or weakness. He has tried ice, heat, NSAIDs and analgesics for the symptoms.      PEG Assessment   What number best describes your pain on average in the past week?0  What number best describes how, during the past week, pain has interfered with your enjoyment of life?0  What number best describes how, during the past week, pain has interfered with your general activity?  0    The following portions of the patient's history were reviewed and updated as appropriate: allergies, current medications, past family history, past medical history, past social history, past surgical history, and problem list.    Review of Systems   Constitutional:  Negative for fever.   Cardiovascular:  Negative for chest pain.   Gastrointestinal:  Negative for abdominal pain, constipation and diarrhea.   Genitourinary:  Negative for difficulty urinating and dysuria.   Musculoskeletal:  Positive for arthralgias (right shoulder). Negative  "for back pain.   Neurological:  Negative for weakness, numbness and headaches.   Psychiatric/Behavioral:  Positive for sleep disturbance. Negative for suicidal ideas. The patient is not nervous/anxious.      --  The aforementioned information the Chief Complaint section and above subjective data including any HPI data, and also the Review of Systems data, has been personally reviewed and affirmed.  --    Vitals:    07/25/23 0829   BP: 142/88   Pulse: 67   Resp: 18   Temp: 98.2 °F (36.8 °C)   SpO2: 97%   Weight: 84.6 kg (186 lb 6.4 oz)   Height: 180.3 cm (71\")   PainSc: 0-No pain     Objective   Physical Exam  Vitals and nursing note reviewed.   Constitutional:       Appearance: Normal appearance. He is well-developed.   Eyes:      General: Lids are normal.   Cardiovascular:      Rate and Rhythm: Normal rate.   Pulmonary:      Effort: Pulmonary effort is normal.   Neurological:      Mental Status: He is alert and oriented to person, place, and time.   Psychiatric:         Speech: Speech normal.         Behavior: Behavior normal.         Judgment: Judgment normal.       Assessment & Plan   Diagnoses and all orders for this visit:    1. Lumbar disc herniation with radiculopathy (Primary)      Sadi MYKE Reyes reports a pain score of 0.  Given his pain assessment as noted, treatment options were discussed and the following options were decided upon as a follow-up plan to address the patient's pain: continuation of current treatment plan for pain.    --- Currently Mr. Reyes has no pain. At this time we will leave this open ended. If his pain returns he will make an office appt with plans to proceed with injections PRN  --- Follow-up if pain returns/worsens.      Dictated utilizing Dragon dictation.      "

## 2023-07-26 ENCOUNTER — TELEPHONE (OUTPATIENT)
Dept: ORTHOPEDIC SURGERY | Facility: CLINIC | Age: 48
End: 2023-07-26

## 2023-07-26 NOTE — TELEPHONE ENCOUNTER
Caller: OTHER    Relationship: EMPLOYER     Best call back number: [phone]    What form or medical record are you requestin/24 PROG NOTES AND ANY PHYSICAL THERAPY ORDER     Who is requesting this form or medical record from you: [employer]    How would you like to receive the form or medical records (pick-up, mail, fax): FAX  If fax, what is the fax number: [phone]    Timeframe paperwork needed: ASAP

## 2023-07-28 ENCOUNTER — TREATMENT (OUTPATIENT)
Dept: PHYSICAL THERAPY | Facility: CLINIC | Age: 48
End: 2023-07-28
Payer: OTHER MISCELLANEOUS

## 2023-07-28 DIAGNOSIS — Z98.890 S/P ARTHROSCOPY OF RIGHT SHOULDER: Primary | ICD-10-CM

## 2023-07-28 DIAGNOSIS — M25.611 DECREASED RANGE OF MOTION OF RIGHT SHOULDER: ICD-10-CM

## 2023-07-28 DIAGNOSIS — R29.898 DECREASED STRENGTH OF UPPER EXTREMITY: ICD-10-CM

## 2023-07-28 DIAGNOSIS — M25.511 ACUTE PAIN OF RIGHT SHOULDER: ICD-10-CM

## 2023-07-28 NOTE — PROGRESS NOTES
Physical Therapy Daily Progress Note                                            [address] [facility], [address]                                                           [place], [place]                                                         [phone]    Patient: Sadi Reyes   : 1975  Diagnosis/ICD-10 Code:  S/P arthroscopy of right shoulder [Z98.890]  Referring practitioner: Gardenia Norris MD  Date of Initial Visit: Type: THERAPY  Noted: 2023  Today's Date: 2023  Patient seen for 11 sessions           Subjective Evaluation    History of Present Illness    Subjective comment: Tin reports no change in R shoulder pain/ discomfort this date. The pain he felt in his R bicep has now dissipated with bicep stretching. He had his follow-up with Dr. Norris's office and she has not yet cleared him for return to work. She will request for more PT visits     Objective   See Exercise, Manual, and Modality Logs for complete treatment.       Assessment & Plan     Assessment    Assessment details: Sadi completed all strengthening exercises without reports of pain this date. He continues to demonstrate improved R shoulder AAROM, though is unable to replicate full motion independently due to increased pain and tightness. Plan to progress strengthening exercises as tolerated next session and possibly initiate manual therapy techniques for pain relief.       Progress per Plan of Care           Manual Therapy:    0     mins  65132;  Therapeutic Exercise:    40     mins  79923;     Neuromuscular Melvin:    0    mins  67161;    Therapeutic Activity:     0     mins  08262;     Gait Trainin     mins  00518;     Ultrasound:     0     mins  18742;    Electrical Stimulation:    0     mins  79841 ( );  Dry Needling     0     mins self-pay    Timed Treatment:   40   mins   Total Treatment:     40   mins    Biju Mora PT, DPT  Physical Therapist  [place] License #: [id number]  Biju Mora PT,  07/28/23, 11:38 AM EDT

## 2023-08-02 ENCOUNTER — TREATMENT (OUTPATIENT)
Dept: PHYSICAL THERAPY | Facility: CLINIC | Age: 48
End: 2023-08-02
Payer: OTHER MISCELLANEOUS

## 2023-08-02 ENCOUNTER — TELEPHONE (OUTPATIENT)
Dept: ORTHOPEDIC SURGERY | Facility: CLINIC | Age: 48
End: 2023-08-02
Payer: COMMERCIAL

## 2023-08-02 DIAGNOSIS — Z98.890 STATUS POST ARTHROSCOPY OF LEFT SHOULDER: Primary | ICD-10-CM

## 2023-08-02 DIAGNOSIS — M25.511 ACUTE PAIN OF RIGHT SHOULDER: ICD-10-CM

## 2023-08-02 DIAGNOSIS — R29.898 DECREASED STRENGTH OF UPPER EXTREMITY: ICD-10-CM

## 2023-08-02 DIAGNOSIS — M25.611 DECREASED RANGE OF MOTION OF RIGHT SHOULDER: ICD-10-CM

## 2023-08-02 DIAGNOSIS — Z98.890 STATUS POST ARTHROSCOPY OF SHOULDER: ICD-10-CM

## 2023-08-02 DIAGNOSIS — Z98.890 S/P ARTHROSCOPY OF RIGHT SHOULDER: Primary | ICD-10-CM

## 2023-08-10 DIAGNOSIS — Z98.890 STATUS POST ARTHROSCOPY OF LEFT SHOULDER: Primary | ICD-10-CM

## 2023-08-10 DIAGNOSIS — Z98.890 S/P ARTHROSCOPY OF SHOULDER: ICD-10-CM

## 2023-08-10 DIAGNOSIS — Z98.890 STATUS POST ARTHROSCOPY OF SHOULDER: ICD-10-CM

## 2023-08-16 ENCOUNTER — TELEPHONE (OUTPATIENT)
Dept: ORTHOPEDIC SURGERY | Facility: CLINIC | Age: 48
End: 2023-08-16

## 2023-08-16 NOTE — TELEPHONE ENCOUNTER
Caller: NATALYA    Relationship: NURSE      Best call back number:     What form or medical record are you requesting: A COPY OF THE NEW ORDER FOR PT    Who is requesting this form or medical record from you: [employer]    How would you like to receive the form or medical records (pick-up, mail, fax): FAX  If fax, what is the fax number: [phone]    Timeframe paperwork needed: ASAP

## 2023-08-17 DIAGNOSIS — Z98.890 STATUS POST ARTHROSCOPY OF LEFT SHOULDER: Primary | ICD-10-CM

## 2023-08-17 DIAGNOSIS — Z98.890 STATUS POST ARTHROSCOPY OF SHOULDER: ICD-10-CM

## 2023-08-21 ENCOUNTER — TREATMENT (OUTPATIENT)
Dept: PHYSICAL THERAPY | Facility: CLINIC | Age: 48
End: 2023-08-21
Payer: OTHER MISCELLANEOUS

## 2023-08-21 ENCOUNTER — OFFICE VISIT (OUTPATIENT)
Dept: ORTHOPEDIC SURGERY | Facility: CLINIC | Age: 48
End: 2023-08-21
Payer: OTHER MISCELLANEOUS

## 2023-08-21 VITALS — HEIGHT: 71 IN | TEMPERATURE: 98.3 F | WEIGHT: 188.8 LBS | BODY MASS INDEX: 26.43 KG/M2

## 2023-08-21 DIAGNOSIS — R29.898 DECREASED STRENGTH OF UPPER EXTREMITY: ICD-10-CM

## 2023-08-21 DIAGNOSIS — Z98.890 S/P ARTHROSCOPY OF SHOULDER: Primary | ICD-10-CM

## 2023-08-21 DIAGNOSIS — M25.611 DECREASED RANGE OF MOTION OF RIGHT SHOULDER: ICD-10-CM

## 2023-08-21 DIAGNOSIS — Z98.890 S/P ARTHROSCOPY OF RIGHT SHOULDER: Primary | ICD-10-CM

## 2023-08-21 DIAGNOSIS — M25.511 ACUTE PAIN OF RIGHT SHOULDER: ICD-10-CM

## 2023-08-21 NOTE — PROGRESS NOTES
Physical Therapy Daily Progress Note                                            [address] [facility], [address]                                                           [place], [place]                                                         [phone]    Patient: Sadi Reyes   : 1975  Diagnosis/ICD-10 Code:  S/P arthroscopy of right shoulder [Z98.890]  Referring practitioner: Gardenia Norris MD  Date of Initial Visit: Type: THERAPY  Noted: 2023  Today's Date: 2023  Patient seen for 13 sessions           Subjective Evaluation    History of Present Illness    Subjective comment: Tin reports that he had a follow-up with Dr. Norris this morning. Their plan will be  to increase his frequency of therapy and then a possible injection if eneded at the end of next month. He feels that he is improving his ROM overall but still has pain with quick movments and with strengthening overhead     Objective     See Exercise, Manual, and Modality Logs for complete treatment.       Assessment & Plan       Assessment  Assessment details: Tin tolerated all newly initiated over head and proprioceptive exercises this session without complaints of R shoulder pain, though did report muscular fatigue following each newly added exercise. Ball toss into trampoline was done this date to give him an idea of how his shoulder will feel with throwing and catching a baseball, and he did not have any pain with the exercise.  Sadi was encouraged to focus on his posture when throwing/ catching to ensure neutral scapular posture and adding in scapular retraction to allow his scapular muscles to assist his shoulder. Plan to continue with proprioceptive and overhead activities next session to assist with return to work.      Progress per Plan of Care           Manual Therapy:    0     mins  09406;  Therapeutic Exercise:    28     mins  00706;     Neuromuscular Melvin:    20    mins  89606;    Therapeutic Activity:      0     mins  09470;     Gait Trainin     mins  39735;     Ultrasound:     0     mins  62292;    Electrical Stimulation:    0     mins  23964 ( );  Dry Needling     0     mins self-pay    Timed Treatment:   48   mins   Total Treatment:     48   mins    Biju Mora PT, DPT  Physical Therapist  [place] License #: [id number]  Biju Mora PT, 23, 11:10 AM EDT

## 2023-08-21 NOTE — PROGRESS NOTES
"Shoulder Scope Follow Up       Patient: Sadi Reyes        YOB: 1975      Chief Complaints: shoulder pain right      History of Present Illness: Pt is here f/u shoulder arthroscopy on the right we finally got more therapy approved and he goes today he seems to be slowly improving        Allergies:   Allergies   Allergen Reactions    Inderal La [Propranolol] Diarrhea and Nausea And Vomiting       Medications:   Home Medications:  Current Outpatient Medications on File Prior to Visit   Medication Sig    acetaminophen (TYLENOL) 500 MG tablet Take 1 tablet by mouth Every 6 (Six) Hours As Needed for Mild Pain.    amLODIPine (NORVASC) 10 MG tablet Take 1 tablet by mouth Daily.    atorvastatin (LIPITOR) 20 MG tablet TAKE 1 TABLET BY MOUTH EVERY DAY    esomeprazole (nexIUM) 40 MG capsule Take 1 capsule by mouth 2 (Two) Times a Day Before Meals.    escitalopram (Lexapro) 10 MG tablet Take 1 tablet by mouth Daily.     No current facility-administered medications on file prior to visit.     Current Medications:  Scheduled Meds:  Continuous Infusions:No current facility-administered medications for this visit.    PRN Meds:.          Physical Exam: 47 y.o. male  General Appearance:    Alert, cooperative, in no acute distress                   Vitals:    08/21/23 0836   Temp: 98.3 øF (36.8 øC)   Weight: 85.6 kg (188 lb 12.8 oz)   Height: 180.3 cm (71\")   PainSc:   7      Patient is alert and oriented x3 no acute distress normal mood physical exam.  Physical exam of the shoulder, incisions looked good there is no erythema,no signs or sx of infection.  Physical exam of the right shoulder reveals no overlying skin changes no lymphedema no lymphadenopathy.  Patient has active flexion 180 with mild symptoms abduction is similar external rotation is to 50 and internal rotation to the upper lumbar spine with mild symptoms.  Patient has good rotator cuff strength 4+ over 5 with isometric strength testing with pain.  " Patient has a positive impingement and a positive Pack sign.  Patient has good cervical range of motion which is full and asymptomatic no radicular symptoms.  Patient has a normal elbow exam.  Good distal pulses are present  Patient has pain with overhead activity and a positive Neer sign and a positive empty can sign , a positive drop arm and a definitive painful arc   Assessment  S/P shoulder scope.  I think he is slowly improving he will get back into therapy this week I want to see him back in 3 weeks if he still symptomatic at all might consider an injection to try to get him over the hump        Plan: Continue Physical Therapy. I reiterated restrictions I will see him back in 3 weeks

## 2023-08-21 NOTE — LETTER
August 21, 2023     Patient: Sadi Reyes   YOB: 1975   Date of Visit: 8/21/2023       To Whom It May Concern:    It is my medical opinion that Sadi Reyes  will remain off work.           Sincerely,        Gardenia Norris MD    CC:   No Recipients

## 2023-08-23 ENCOUNTER — TREATMENT (OUTPATIENT)
Dept: PHYSICAL THERAPY | Facility: CLINIC | Age: 48
End: 2023-08-23
Payer: OTHER MISCELLANEOUS

## 2023-08-23 DIAGNOSIS — R29.898 DECREASED STRENGTH OF UPPER EXTREMITY: ICD-10-CM

## 2023-08-23 DIAGNOSIS — M25.611 DECREASED RANGE OF MOTION OF RIGHT SHOULDER: Primary | ICD-10-CM

## 2023-08-23 DIAGNOSIS — Z98.890 S/P ARTHROSCOPY OF RIGHT SHOULDER: ICD-10-CM

## 2023-08-23 DIAGNOSIS — M25.511 ACUTE PAIN OF RIGHT SHOULDER: ICD-10-CM

## 2023-08-23 NOTE — PROGRESS NOTES
Physical Therapy Re-Assessment Note                                            [address] [facility], [address]                                                           [place], [place]                                                         [phone]    Patient: Sadi Reyes   : 1975  Diagnosis/ICD-10 Code:  Decreased range of motion of right shoulder [M25.611]  Referring practitioner: Gardenia Norris MD  Date of Initial Visit: Type: THERAPY  Noted: 2023  Today's Date: 2023  Patient seen for 14 sessions           Subjective Evaluation    History of Present Illness  Mechanism of injury: Sadi reports overall improvement in R shoulder pain and discomfort. He no longer is experiencing numbness and tingling down his R arm    His main complaint is increased tightness and pain when he wakes up in the morning after lying on his R shoulder    He still has difficulty with washing his back and reaching on high shelves. He is able to do these tasks but it brings on pain       Quick DASH: 15.91     Pain  Current pain ratin  At best pain ratin  At worst pain ratin       Objective     Strength/Myotome Testing      Left Shoulder      Planes of Motion   Flexion: 5   Abduction: 5   External rotation at 0ø: 5   Internal rotation at 0ø: 5      Right Shoulder      Planes of Motion   Flexion: 5   Abduction: 5   External rotation at 0ø: 5   Internal rotation at 0ø: 5        Active Range of Motion   Left Shoulder   Normal active range of motion    Right Shoulder   Flexion: 144 degrees with pain  Abduction: 103 degrees with pain  External rotation 0ø: 72 degrees with pain     Short Term Goals  2. Pt will report >/= 50% decrease in R shoulder pain with active movement- MET as stated    Long Term Goals: 12 weeks  1. Pt will be independent with progressed HEP- Progressing  2. Pt will report >/= 90% decrease in R shoulder pain during all active movement- Progressing  3. Pt will demonstrate improved R  shoulder AROM flexion and abduction to 140* for indication of improved ability to perform all ADLs and work duties- MET as stated  4. Pt will demonstrate improved R shoulder strength through MMT scores of >/= 4+/5 for indication of ability to perform all ADLs and work duties independently and safely without reports of pain- MET as stated  5. 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 Pt will report ability to open his grand child's sippy cup without increased R shoulder pain for indication of improved ability to perform ADLs and improved quality of life- Progressing: he reports that he is able to open jars and his grandchild's sippy cup but requires assistance from a wet rage or similar    New Goals: to be met in 3 weeks  Pt will perform lifting and twisting with a 15-20lb box to an overhead shelf x 10 repetitions without significant reports of R shoulder pain (<2/10) for indication of ability to return to work without restrictions    See Exercise, Manual, and Modality Logs for complete treatment.       Assessment & Plan       Assessment  Assessment details: Time spent re-assessing all goals and objective measurements this session. Sadi has now met his R shoulder strength goal and demonstrated 5/5 strength in all planes of motion today. He displayed progression of R shoulder AROM as well though has persisting deficits in abduction with associated pain. Sadi feels that he has continued difficulty with overhead motion and reaching outside of his JOCELYN due to pain and he also struggles with quick motions of his R shoulder. Plan moving forward will be to continue progressing his R shoulder AROM and begin functional strengthening and stabilization activities. Also plan to initiate strengthening to mimic his work duties.        Progress strengthening /stabilization /functional activity           Manual Therapy:    0     mins  40172;  Therapeutic Exercise:   19     mins  78201;     Neuromuscular Melvin:    0    mins  56565;    Therapeutic Activity:     21     mins  72170;     Gait Trainin     mins  48400;     Ultrasound:     0     mins  58795;    Electrical Stimulation:    0     mins  28209 ( );  Dry Needling     0     mins self-pay    Timed Treatment:   40   mins   Total Treatment:     40   mins    Biju Mora PT, DPT  Physical Therapist  [place] License #: [id number]  Biju Mora PT, 23, 11:34 AM EDT

## 2023-08-25 ENCOUNTER — TELEPHONE (OUTPATIENT)
Dept: ORTHOPEDIC SURGERY | Facility: CLINIC | Age: 48
End: 2023-08-25

## 2023-08-25 NOTE — TELEPHONE ENCOUNTER
Caller: NATALYA      [place] call back number: [phone]    What form or medical record are you requesting: OFFICE NOTES FROM 8/21,     Who is requesting this form or medical record from you: W/C    How would you like to receive the form or medical records (pick-up, mail, fax):   If fax, what is the fax number: [phone]

## 2023-08-28 ENCOUNTER — TREATMENT (OUTPATIENT)
Dept: PHYSICAL THERAPY | Facility: CLINIC | Age: 48
End: 2023-08-28
Payer: COMMERCIAL

## 2023-08-28 DIAGNOSIS — M25.611 DECREASED RANGE OF MOTION OF RIGHT SHOULDER: Primary | ICD-10-CM

## 2023-08-28 DIAGNOSIS — R29.898 DECREASED STRENGTH OF UPPER EXTREMITY: ICD-10-CM

## 2023-08-28 DIAGNOSIS — M25.511 ACUTE PAIN OF RIGHT SHOULDER: ICD-10-CM

## 2023-08-28 DIAGNOSIS — Z98.890 S/P ARTHROSCOPY OF RIGHT SHOULDER: ICD-10-CM

## 2023-08-28 NOTE — PROGRESS NOTES
Physical Therapy Daily Progress Note                                            [address] [facility], [address]                                                           [place], [place]                                                         [phone]    Patient: Sadi Reyes   : 1975  Diagnosis/ICD-10 Code:  Decreased range of motion of right shoulder [M25.611]  Referring practitioner: Gardenia Norris MD  Date of Initial Visit: Type: THERAPY  Noted: 2023  Today's Date: 2023  Patient seen for 15 sessions           Subjective Evaluation    History of Present Illness    Subjective comment: Sadi reports that he has not had any increased soreness since his last session. He was sore the day following his session but it did not last into the following day. He was able to play basketball with his son over the weekend and shot with his R UE without excessive soreness.     Objective     See Exercise, Manual, and Modality Logs for complete treatment.       Assessment & Plan       Assessment  Assessment details: Sadi completed all R shoulder strengthening and AAROM this session without complaints of pain. He tolerated progression of nearly all exercises with either increased repetitions or increased resistance, indicating improvement in R shoulder strength. Sadi tolerates plyometric and functional strengthening activities though reports fearfulness with catching heavier balls. Plan to progress all exercises as tolerated.       Progress per Plan of Care           Manual Therapy:    0     mins  69026;  Therapeutic Exercise:    30     mins  07046;     Neuromuscular Melvin:    10    mins  35806;    Therapeutic Activity:     0     mins  20924;     Gait Trainin     mins  95607;     Ultrasound:     0     mins  86767;    Electrical Stimulation:    0     mins  88918 ( );  Dry Needling     0     mins self-pay    Timed Treatment:   40   mins   Total Treatment:     40   mins    Biju  Morgan, PT, DPT  Physical Therapist  [place] License #: [id number]  Biju Mora, PT, 08/28/23, 10:35 AM EDT

## 2023-08-30 ENCOUNTER — TREATMENT (OUTPATIENT)
Dept: PHYSICAL THERAPY | Facility: CLINIC | Age: 48
End: 2023-08-30
Payer: OTHER MISCELLANEOUS

## 2023-08-30 DIAGNOSIS — Z98.890 S/P ARTHROSCOPY OF RIGHT SHOULDER: ICD-10-CM

## 2023-08-30 DIAGNOSIS — M25.511 ACUTE PAIN OF RIGHT SHOULDER: ICD-10-CM

## 2023-08-30 DIAGNOSIS — M25.611 DECREASED RANGE OF MOTION OF RIGHT SHOULDER: Primary | ICD-10-CM

## 2023-08-30 DIAGNOSIS — R29.898 DECREASED STRENGTH OF UPPER EXTREMITY: ICD-10-CM

## 2023-08-30 NOTE — PROGRESS NOTES
Physical Therapy Daily Progress Note                                            [address] [facility], [address]                                                           [place], [place]                                                         [phone]    Patient: Sadi Reyes   : 1975  Diagnosis/ICD-10 Code:  Decreased range of motion of right shoulder [M25.611]  Referring practitioner: Gardenia Norris MD  Date of Initial Visit: Type: THERAPY  Noted: 2023  Today's Date: 2023  Patient seen for 16 sessions           Subjective Evaluation    History of Present Illness  Mechanism of injury: Sadi reports that he experienced soreness following his session on Monday but it did not carry over into Tuesday. He denies significant soreness prior to his treatment session today         Objective     See Exercise, Manual, and Modality Logs for complete treatment.       Assessment & Plan       Assessment  Assessment details: Sadi completed all R shoulder activities this date with intermittent reports of tightness and slight pinching or sharp pain. He experiences the most discomfort with R shoulder abduction, both resisted and non-resisted. Sadi demonstrates improved R shoulder strength through his ability to perform exercises with increased weight and with decreased reports of pain or discomfort during plyometric activities.  Plan to continue progressing all activities and initiate further functional motions.       Progress per Plan of Care           Manual Therapy:    0     mins  34558;  Therapeutic Exercise:    21     mins  26334;     Neuromuscular Melvin:    20    mins  35158;    Therapeutic Activity:     0     mins  39790;     Gait Trainin     mins  98318;     Ultrasound:     0     mins  16015;    Electrical Stimulation:    0     mins  60117 ( );  Dry Needling     0     mins self-pay    Timed Treatment:   41   mins   Total Treatment:     41   mins    Biju Mora PT,  DPT  Physical Therapist  [place] License #: [id number]  Biju Mora, PT, 08/30/23, 11:40 AM EDT

## 2023-09-05 ENCOUNTER — TREATMENT (OUTPATIENT)
Dept: PHYSICAL THERAPY | Facility: CLINIC | Age: 48
End: 2023-09-05
Payer: OTHER MISCELLANEOUS

## 2023-09-05 DIAGNOSIS — M25.611 DECREASED RANGE OF MOTION OF RIGHT SHOULDER: Primary | ICD-10-CM

## 2023-09-05 DIAGNOSIS — R29.898 DECREASED STRENGTH OF UPPER EXTREMITY: ICD-10-CM

## 2023-09-05 DIAGNOSIS — M25.511 ACUTE PAIN OF RIGHT SHOULDER: ICD-10-CM

## 2023-09-05 DIAGNOSIS — Z98.890 S/P ARTHROSCOPY OF RIGHT SHOULDER: ICD-10-CM

## 2023-09-05 NOTE — PROGRESS NOTES
Physical Therapy Daily Progress Note                                            [address] [facility], [address]                                                           [place], [place]                                                         [phone]    Patient: Sadi Reyes   : 1975  Diagnosis/ICD-10 Code:  Decreased range of motion of right shoulder [M25.611]  Referring practitioner: Gardenia Norris MD  Date of Initial Visit: Type: THERAPY  Noted: 2023  Today's Date: 2023  Patient seen for 17 sessions           Subjective Evaluation    History of Present Illness    Subjective comment: Sadi denies any soreness prior to his treatment session     Objective     See Exercise, Manual, and Modality Logs for complete treatment.       Assessment & Plan       Assessment  Assessment details: Sadi completed all R shoulder strengthening and range of motion exercises without complaints of pain. He did have reports of increased R shoulder fatigue and soreness at the conclusion of his session, which is to be expected. R shoulder extension in prone was initiated this date to begin improving shoulder extension strength. Will plan on initiating tricep strengthening as well next session. Also plan to progress all current exercises as tolerated.       Progress per Plan of Care           Manual Therapy:    0     mins  23042;  Therapeutic Exercise:    33     mins  42499;     Neuromuscular Melvin:    10    mins  49504;    Therapeutic Activity:     0     mins  80646;     Gait Trainin     mins  90307;     Ultrasound:     0     mins  20058;    Electrical Stimulation:    0     mins  26892 ( );  Dry Needling     0     mins self-pay    Timed Treatment:   43   mins   Total Treatment:     43   mins    Biju Mora PT, DPT  Physical Therapist  [place] License #: [id number]  Biju Mora PT, 23, 11:36 AM EDT

## 2023-09-07 ENCOUNTER — TREATMENT (OUTPATIENT)
Dept: PHYSICAL THERAPY | Facility: CLINIC | Age: 48
End: 2023-09-07
Payer: OTHER MISCELLANEOUS

## 2023-09-07 DIAGNOSIS — M25.511 ACUTE PAIN OF RIGHT SHOULDER: ICD-10-CM

## 2023-09-07 DIAGNOSIS — Z98.890 S/P ARTHROSCOPY OF RIGHT SHOULDER: ICD-10-CM

## 2023-09-07 DIAGNOSIS — R29.898 DECREASED STRENGTH OF UPPER EXTREMITY: ICD-10-CM

## 2023-09-07 DIAGNOSIS — M25.611 DECREASED RANGE OF MOTION OF RIGHT SHOULDER: Primary | ICD-10-CM

## 2023-09-07 NOTE — PROGRESS NOTES
"   Physical Therapy Daily Progress Note                                            [address] [facility], [address]                                                           [place], [place]                                                         [phone]    Patient: Sadi Reyes   : 1975  Diagnosis/ICD-10 Code:  Decreased range of motion of right shoulder [M25.611]  Referring practitioner: Gardenia Norris MD  Date of Initial Visit: Type: THERAPY  Noted: 2023  Today's Date: 2023  Patient seen for 18 sessions           Subjective Evaluation    History of Present Illness    Subjective comment: Sadi reports that he was not sore after his last session. He denies any pain this date prior to tx session.     Objective     See Exercise, Manual, and Modality Logs for complete treatment.       Assessment & Plan       Assessment  Assessment details: Sadi completed all R shoulder strengthening without complaints of pain though did report muscle soreness and a feeling of \"a good work out\" at the conclusion of his session. He tolerated progression of several strengthening exercises with either increased weight or increased repetitions. Plan to continue progressing current exercises and add more proprioceptive and plyometric exercises next session.       Progress per Plan of Care           Manual Therapy:    0     mins  79691;  Therapeutic Exercise:    30     mins  03921;     Neuromuscular Melvin:    11    mins  90999;    Therapeutic Activity:     0     mins  42454;     Gait Trainin     mins  63077;     Ultrasound:     0     mins  62936;    Electrical Stimulation:    0     mins  48448 ( );  Dry Needling     0     mins self-pay    Timed Treatment:   41   mins   Total Treatment:     41   mins    Biju Mora PT, DPT  Physical Therapist  [place] License #: [id number]  Biju Mora PT, 23, 11:43 AM EDT                     "

## 2023-09-12 ENCOUNTER — TREATMENT (OUTPATIENT)
Dept: PHYSICAL THERAPY | Facility: CLINIC | Age: 48
End: 2023-09-12
Payer: OTHER MISCELLANEOUS

## 2023-09-12 DIAGNOSIS — M25.511 ACUTE PAIN OF RIGHT SHOULDER: ICD-10-CM

## 2023-09-12 DIAGNOSIS — M25.611 DECREASED RANGE OF MOTION OF RIGHT SHOULDER: Primary | ICD-10-CM

## 2023-09-12 DIAGNOSIS — R29.898 DECREASED STRENGTH OF UPPER EXTREMITY: ICD-10-CM

## 2023-09-12 DIAGNOSIS — Z98.890 S/P ARTHROSCOPY OF RIGHT SHOULDER: ICD-10-CM

## 2023-09-12 NOTE — PROGRESS NOTES
Physical Therapy Daily Progress Note                                            [address] [facility], [address]                                                           [place], [place]                                                         [phone]    Patient: Sadi Reyes   : 1975  Diagnosis/ICD-10 Code:  Decreased range of motion of right shoulder [M25.611]  Referring practitioner: Gardenia Norris MD  Date of Initial Visit: Type: THERAPY  Noted: 2023  Today's Date: 2023  Patient seen for 19 sessions           Subjective Evaluation    History of Present Illness    Subjective comment: Sadi reports that he experienced a sharp pain along the anterior aspect of his R shoulder when attempting to lift a large tub over the weekend. He does not have any residual soreness today, however     Objective   See Exercise, Manual, and Modality Logs for complete treatment.       Assessment & Plan       Assessment  Assessment details: Sadi completed all R shoulder strengthening without reports of pain this date. He continues to demonstrate full R shoulder AROM in all planes. Sadi again tolerated progressions of repetitions and/ or weights made at his last session though had significant muscular fatigue at the conclusion of today's session. Plan to initiate lattisimus dorsi strengthening and increase middle trap strengthening to assist with improving R shoulder extension and horizontal abduction strength.       Progress per Plan of Care           Manual Therapy:    0     mins  14187;  Therapeutic Exercise:    30     mins  13788;     Neuromuscular Melvin:    10    mins  88639;    Therapeutic Activity:     0     mins  69201;     Gait Trainin     mins  18784;     Ultrasound:     0     mins  64557;    Electrical Stimulation:    0     mins  03129 ( );  Dry Needling     40     mins self-pay    Timed Treatment:   40   mins   Total Treatment:     40   mins    Biju Mora PT,  DPT  Physical Therapist  [place] License #: [id number]  Biju Mora, PT, 09/12/23, 11:08 AM EDT

## 2023-09-14 ENCOUNTER — TELEPHONE (OUTPATIENT)
Dept: PHYSICAL THERAPY | Facility: CLINIC | Age: 48
End: 2023-09-14

## 2023-09-14 NOTE — TELEPHONE ENCOUNTER
Caller: Sadi Reyes    Relationship: Self    What was the call regarding:PATIENT WILL NOT BE AT TODAY'S APPT DUE TO CONFLICT IN SCHEDULE

## 2023-09-21 ENCOUNTER — TREATMENT (OUTPATIENT)
Dept: PHYSICAL THERAPY | Facility: CLINIC | Age: 48
End: 2023-09-21
Payer: OTHER MISCELLANEOUS

## 2023-09-21 DIAGNOSIS — R29.898 DECREASED STRENGTH OF UPPER EXTREMITY: ICD-10-CM

## 2023-09-21 DIAGNOSIS — M25.611 DECREASED RANGE OF MOTION OF RIGHT SHOULDER: Primary | ICD-10-CM

## 2023-09-21 DIAGNOSIS — Z98.890 S/P ARTHROSCOPY OF RIGHT SHOULDER: ICD-10-CM

## 2023-09-21 DIAGNOSIS — M25.511 ACUTE PAIN OF RIGHT SHOULDER: ICD-10-CM

## 2023-09-21 NOTE — PROGRESS NOTES
Physical Therapy Daily Progress Note                                            [address] [facility], [address]                                                           [place], [place]                                                         [phone]    Patient: Sadi Reyes   : 1975  Diagnosis/ICD-10 Code:  Decreased range of motion of right shoulder [M25.611]  Referring practitioner: Gardenia Norris MD  Date of Initial Visit: Type: THERAPY  Noted: 2023  Today's Date: 2023  Patient seen for 20 sessions           Subjective Evaluation    History of Present Illness    Subjective comment: Sadi reports that his R shoulder is doing well. He continues to have intermittent sharp pains with quick movements     Objective     See Exercise, Manual, and Modality Logs for complete treatment.       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 R shoulder IR stretching with a towel was initiated this date to assist with further improving his range of motion behind his back. This stretch caused him a significant amount of discomfort and pain throughout his session and he points to his supraspinatus and bicep tendons. He was encouraged to be gentle with this stretch at home so as to not increase his pain each day. Plan to continue progressing all strengthening as tolerated.       Progress per Plan of Care           Manual Therapy:    0     mins  79164;  Therapeutic Exercise:    39     mins  41573;     Neuromuscular Melvin:    0    mins  78027;    Therapeutic Activity:     0     mins  12883;     Gait Trainin     mins  15946;     Ultrasound:     0     mins  43706;    Electrical Stimulation:    0     mins  34465 ( );  Dry Needling     0     mins self-pay    Timed Treatment:   39   mins   Total Treatment:     39   mins    Biju  Morgan, PT, DPT  Physical Therapist  [place] License #: [id number]  Biju Mora, PT, 09/21/23, 11:04 AM EDT

## 2023-09-22 NOTE — PROGRESS NOTES
Patient: Sadi Reyes  YOB: 1975  Date of Service: 9/22/2023    Chief Complaints:   Right shoulder pain  Subjective:    History of Present Illness: Pt is seen in the office today with complaints of right shoulder pain he is status post shoulder arthroscopy he has been improving he has been in physical therapy we talked about an injection he would like to do that next week.  I will see him on the fifth and then he would like to return to work on the 10th.  He has not been working on strengthening etc. getting ready for his return to work        Allergies:   Allergies   Allergen Reactions    Inderal La [Propranolol] Diarrhea and Nausea And Vomiting       Medications:   Home Medications:  Current Outpatient Medications on File Prior to Visit   Medication Sig    acetaminophen (TYLENOL) 500 MG tablet Take 1 tablet by mouth Every 6 (Six) Hours As Needed for Mild Pain.    amLODIPine (NORVASC) 10 MG tablet Take 1 tablet by mouth Daily.    atorvastatin (LIPITOR) 20 MG tablet TAKE 1 TABLET BY MOUTH EVERY DAY    escitalopram (Lexapro) 10 MG tablet Take 1 tablet by mouth Daily.    esomeprazole (nexIUM) 40 MG capsule Take 1 capsule by mouth 2 (Two) Times a Day Before Meals.     No current facility-administered medications on file prior to visit.     Current Medications:  Scheduled Meds:  Continuous Infusions:No current facility-administered medications for this visit.    PRN Meds:.    I have reviewed the patient's medical history in detail and updated the computerized patient record.  Review and summarization of old records include:    Past Medical History:   Diagnosis Date    Acute nephritis     Acute pyelonephritis     VANESSA (acute kidney injury)     Carpal tunnel syndrome     Chronic kidney disease     STAGE 3    Cluster headache     GERD (gastroesophageal reflux disease)     Headache, tension-type     Hyperlipidemia     Hypertension     Migraine     Palpitations     Right shoulder pain     Spinal headache      Syncope         Past Surgical History:   Procedure Laterality Date    BUNIONECTOMY Right     X3    COLONOSCOPY N/A 11/17/2022    Procedure: COLONOSCOPY TO CECUM AND TERMINAL ILEUM;  Surgeon: Tono Romero MD;  Location: [facility] ENDOSCOPY;  Service: Gastroenterology;  Laterality: N/A;  PRE- SCREENING  POST- HEMORRHOIDS    ENDOSCOPY N/A 11/02/2020    Procedure: ESOPHAGOGASTRODUODENOSCOPY with biopsies;  Surgeon: Tono Romero MD;  Location: [facility] ENDOSCOPY;  Service: Gastroenterology;  Laterality: N/A;  Pre: epigastric pain  Post: duodenitis, gastritis, hiatal hernia    EPIDURAL Right 03/30/2023    Procedure: Right S1 LUMBAR/SACRAL TRANSFORAMINAL EPIDURAL 16479;  Surgeon: Andre Wilson MD;  Location: [facility] OR;  Service: Pain Management;  Laterality: Right;    HERNIA REPAIR      SHOULDER ARTHROSCOPY W/ SUPERIOR LABRAL ANTERIOR POSTERIOR REPAIR Right 05/10/2023    Procedure: RIGHT SHOULDER ARTHROSCOPY, DEBRIDEMENT OF LABRUM AND ROATOR CUFF, AND DECOMPRESSION;  Surgeon: Gardenia Norris MD;  Location: [facility] OR [facility];  Service: Orthopedics;  Laterality: Right;        Social History     Occupational History    Not on file   Tobacco Use    Smoking status: Never    Smokeless tobacco: Never    Tobacco comments:     rare caffeine   Vaping Use    Vaping Use: Never used   Substance and Sexual Activity    Alcohol use: Yes     Comment: Twice a month    Drug use: Yes     Frequency: 1.0 times per week     Types: Marijuana     Comment: uses daily    Sexual activity: Yes     Partners: Female      Social History     Social History Narrative    Not on file        Family History   Problem Relation Age of Onset    Hypertension Mother     No Known Problems Father     No Known Problems Sister     Prostate cancer Maternal Grandfather     Malig Hyperthermia Neg Hx        ROS: 14 point review of systems was performed and was negative except for documented findings in HPI and today's encounter.     Allergies:   Allergies    Allergen Reactions    Inderal La [Propranolol] Diarrhea and Nausea And Vomiting     Constitutional:  Denies fever, shaking or chills   Eyes:  Denies change in visual acuity   HENT:  Denies nasal congestion or sore throat   Respiratory:  Denies cough or shortness of breath   Cardiovascular:  Denies chest pain or severe LE edema   GI:  Denies abdominal pain, nausea, vomiting, bloody stools or diarrhea   Musculoskeletal:  Numbness, tingling, or loss of motor function only as noted above in history of present illness.  : Denies painful urination or hematuria  Integument:  Denies rash, lesion or ulceration   Neurologic:  Denies headache or focal weakness  Endocrine:  Denies lymphadenopathy  Psych:  Denies confusion or change in mental status   Hem:  Denies active bleeding      Physical Exam: 48 y.o. male  Wt Readings from Last 3 Encounters:   08/21/23 85.6 kg (188 lb 12.8 oz)   07/25/23 84.6 kg (186 lb 6.4 oz)   07/24/23 83.8 kg (184 lb 11.2 oz)       There is no height or weight on file to calculate BMI.    There were no vitals filed for this visit.  Vital signs reviewed.   General Appearance:    Alert, cooperative, in no acute distress                    Ortho exam    Physical exam of the right shoulder reveals no overlying skin changes no lymphedema no lymphadenopathy.  Patient has active flexion 180 with mild symptoms abduction is similar external rotation is to 50 and internal rotation to the upper lumbar spine with mild symptoms.  Patient has good rotator cuff strength 4+ over 5 with isometric strength testing with pain.  Patient has a positive impingement and a positive Pack sign.  Patient has good cervical range of motion which is full and asymptomatic no radicular symptoms.  Patient has a normal elbow exam.  Good distal pulses are presentPatient has pain with overhead activity and a positive Neer sign and a positive empty can sign  They have a positive drop arm any definitive painful arc               Assessment: Status post right shoulder arthroscopy I think he is definitely doing better his strength is better subjectively he is better I will see him back on the fifth we will inject him have him continue with his strengthening and then we will allow him to return to work that next week    Plan: Is as above  Follow up as indicated.  Ice, elevate, and rest as needed.  Discussed conservative measures of pain control including ice, bracing.  Also talked about the importance of strengthening and endurance  Gardenia Norris M.D.

## 2023-09-25 ENCOUNTER — OFFICE VISIT (OUTPATIENT)
Dept: ORTHOPEDIC SURGERY | Facility: CLINIC | Age: 48
End: 2023-09-25

## 2023-09-25 ENCOUNTER — TREATMENT (OUTPATIENT)
Dept: PHYSICAL THERAPY | Facility: CLINIC | Age: 48
End: 2023-09-25

## 2023-09-25 VITALS — BODY MASS INDEX: 26.18 KG/M2 | TEMPERATURE: 97.8 F | HEIGHT: 71 IN | WEIGHT: 187 LBS

## 2023-09-25 DIAGNOSIS — Z98.890 S/P ARTHROSCOPY OF SHOULDER: Primary | ICD-10-CM

## 2023-09-25 DIAGNOSIS — M25.611 DECREASED RANGE OF MOTION OF RIGHT SHOULDER: Primary | ICD-10-CM

## 2023-09-25 DIAGNOSIS — R29.898 DECREASED STRENGTH OF UPPER EXTREMITY: ICD-10-CM

## 2023-09-25 DIAGNOSIS — M25.511 ACUTE PAIN OF RIGHT SHOULDER: ICD-10-CM

## 2023-09-25 DIAGNOSIS — Z98.890 S/P ARTHROSCOPY OF RIGHT SHOULDER: ICD-10-CM

## 2023-09-25 NOTE — PROGRESS NOTES
Physical Therapy Daily Progress Note                                            [address] [facility], [address]                                                           [place], [place]                                                         [phone]    Patient: Sadi Reyes   : 1975  Diagnosis/ICD-10 Code:  Decreased range of motion of right shoulder [M25.611]  Referring practitioner: Gardenia Norris MD  Date of Initial Visit: Type: THERAPY  Noted: 2023  Today's Date: 2023  Patient seen for 21 sessions           Subjective Evaluation    History of Present Illness    Subjective comment: Sadi reports that he was sore following his session last week but this did not last into the next day. He continues to have pain with overhead reaching and reaching behind his head. He had his follow-up with Dr. Norris this morning and their plan moving forward will be to finish with his scheduled PT sessions next week, receive a cortisone injection in his R shoulder on 10/05 and return to work on 10/10. If his R shoulder pain does not increase in the 4-6 weeks following, she will send him for an MRI     Objective   See Exercise, Manual, and Modality Logs for complete treatment.       Assessment & Plan       Assessment  Assessment details: Sadi completed all R shoulder strengthening with minimal complaints of pain. He continues to have increased pain with overhead motions but is able to tolerate strengthening in this position with light resistance. Horizontal abduction strengthening with increased R shoulder flexion was initiated this session to prepare Sadi for return to work. Plan to continue with functional range of motion as well as work-specific training.       Progress per Plan of Care           Manual Therapy:    0     mins  37994;  Therapeutic Exercise:    32     mins  88523;     Neuromuscular Melvin:    10    mins  75658;    Therapeutic Activity:     0     mins  56107;     Gait Training:       0     mins  31548;     Ultrasound:     0     mins  20179;    Electrical Stimulation:    0     mins  02158 ( );  Dry Needling     0     mins self-pay    Timed Treatment:   42   mins   Total Treatment:     42   mins    Biju Mora PT, DPT  Physical Therapist  [place] License #: [id number]  Biju Mora PT, 09/25/23, 11:34 AM EDT

## 2023-09-25 NOTE — LETTER
September 25, 2023     Patient: Sadi Reyes   YOB: 1975   Date of Visit: 9/25/2023       To Whom It May Concern:    It is my medical opinion that Sadi Reyes will be off work.  I will see him again on October 5 and then he will return to work on October 9.           Sincerely,        Gardenia Norris MD    CC:   No Recipients

## 2023-09-26 ENCOUNTER — TELEPHONE (OUTPATIENT)
Dept: ORTHOPEDIC SURGERY | Facility: CLINIC | Age: 48
End: 2023-09-26

## 2023-09-26 NOTE — TELEPHONE ENCOUNTER
Caller: NATALYA    Relationship to patient: WORK COMP  WITH [employer]    Best call back number: [phone]    Patient is needing: PATIENT'S WORK COMP , NATALYA WITH [employer] WAS CALLING TO DISCUSS THE OFFICE NOTE FROM 09.22.23. NATALYA STATED THE OFFICE NOTES FROM 09.22.23 STATE THE RIGHT SHOULDER BUT IN THE ASSESSMENT SECTION IT MENTIONS THE LEFT SHOULDER. NATALYA WOULD LIKE A CALL BACK TO DISCUSS GETTING AN ADDEND TO THIS OFFICE NOTE. THANK YOU!

## 2023-09-27 ENCOUNTER — TREATMENT (OUTPATIENT)
Dept: PHYSICAL THERAPY | Facility: CLINIC | Age: 48
End: 2023-09-27
Payer: OTHER MISCELLANEOUS

## 2023-09-27 DIAGNOSIS — M25.611 DECREASED RANGE OF MOTION OF RIGHT SHOULDER: Primary | ICD-10-CM

## 2023-09-27 DIAGNOSIS — Z98.890 S/P ARTHROSCOPY OF RIGHT SHOULDER: ICD-10-CM

## 2023-09-27 DIAGNOSIS — R29.898 DECREASED STRENGTH OF UPPER EXTREMITY: ICD-10-CM

## 2023-09-27 DIAGNOSIS — M25.511 ACUTE PAIN OF RIGHT SHOULDER: ICD-10-CM

## 2023-09-27 NOTE — PROGRESS NOTES
"   Physical Therapy Daily Progress Note                                            [address] [facility] [facility], [address]                                                           [place], [place]                                                         [phone]    Patient: Sadi Reyes   : 1975  Diagnosis/ICD-10 Code:  Decreased range of motion of right shoulder [M25.611]  Referring practitioner: Gardenia Norris MD  Date of Initial Visit: Type: THERAPY  Noted: 2023  Today's Date: 2023  Patient seen for 22 sessions           Subjective Evaluation    History of Present Illness    Subjective comment: Sadi reports that he did not have any increased soreness after his last session. His main complaint is pain in the morning after he accidentally sleeps on his R side     Objective     See Exercise, Manual, and Modality Logs for complete treatment.       Assessment & Plan       Assessment  Assessment details: Session today focused on R shoulder stabilization through scapular strengthening and functional strengthening as Sadi prepares to return to work. He continues to have slight discomfort with strengthening above shoulder height but is able to tolerate low resistance strengthening in this position. R shoulder flexion to and above 90* caused him to report feeling \"cracking and popping\" at his end-range this date but he stated the sensation was not painful. Plan to continue progressing all R shoulder strengthening and continue with functional strengthening.       Progress per Plan of Care           Manual Therapy:    0     mins  31087;  Therapeutic Exercise:    20     mins  71532;     Neuromuscular Melvin:    10    mins  09596;    Therapeutic Activity:     0     mins  91650;     Gait Trainin     mins  31875;     Ultrasound:     0     mins  32557;    Electrical Stimulation:    0     mins  53174 ( );  Dry Needling     0     mins self-pay    Timed Treatment:   30   mins   Total Treatment:  "    30   mins    Biju Mora, PT, DPT  Physical Therapist  [place] License #: [id number]  Biju Mora, PT, 09/27/23, 9:37 AM EDT

## 2023-09-28 NOTE — TELEPHONE ENCOUNTER
Spoke with Sandrita/PATRICIA and sent MD a message to review chart and if an addendum can be done to resubmit to [employer].

## 2023-10-03 ENCOUNTER — TREATMENT (OUTPATIENT)
Dept: PHYSICAL THERAPY | Facility: CLINIC | Age: 48
End: 2023-10-03
Payer: OTHER MISCELLANEOUS

## 2023-10-03 DIAGNOSIS — M25.611 DECREASED RANGE OF MOTION OF RIGHT SHOULDER: Primary | ICD-10-CM

## 2023-10-03 DIAGNOSIS — R29.898 DECREASED STRENGTH OF UPPER EXTREMITY: ICD-10-CM

## 2023-10-03 DIAGNOSIS — Z98.890 S/P ARTHROSCOPY OF RIGHT SHOULDER: ICD-10-CM

## 2023-10-03 DIAGNOSIS — M25.511 ACUTE PAIN OF RIGHT SHOULDER: ICD-10-CM

## 2023-10-03 NOTE — PROGRESS NOTES
Physical Therapy Daily Progress Note                                            [address] [facility] [facility], [address]                                                           [place], [place]                                                         [phone]    Patient: Sadi Reyes   : 1975  Diagnosis/ICD-10 Code:  Decreased range of motion of right shoulder [M25.611]  Referring practitioner: Gardenia Norris MD  Date of Initial Visit: Type: THERAPY  Noted: 2023  Today's Date: 10/3/2023  Patient seen for 23 sessions           Subjective Evaluation    History of Present Illness    Subjective comment: Sadi reports no change in his R shoulder soreness this date     Objective     See Exercise, Manual, and Modality Logs for complete treatment.       Assessment & Plan       Assessment  Assessment details: Sadi completed all R shoulder strengthening without complaints of pain. He did have muscular fatigue throughout and following the session, requiring a few rest breaks. Work-specific strengthening was completed again this session to prepare Sadi for his return to work in less than one week. Plan to re-assess all goals and objective measurements next session and anticipate discharge at that time.       Progress per Plan of Care           Manual Therapy:    0     mins  36530;  Therapeutic Exercise:    30     mins  22958;     Neuromuscular Melvin:    0    mins  75856;    Therapeutic Activity:     0     mins  91017;     Gait Trainin     mins  37785;     Ultrasound:     0     mins  43204;    Electrical Stimulation:    0     mins  47220 ( );  Dry Needling     0     mins self-pay    Timed Treatment:   30   mins   Total Treatment:     30   mins    Biju Mora PT, DPT  Physical Therapist  [place] License #: [id number]  Biju Mora PT, 10/03/23, 11:38 AM EDT

## 2023-10-04 NOTE — PROGRESS NOTES
Patient: Sadi Reyes  YOB: 1975  Date of Service: 10/4/2023    Chief Complaints: Right shoulder pain    Subjective:    History of Present Illness: Pt is seen in the office today with complaints of right shoulder pain he is status post shoulder arthroscopy he still has this residual pain he is going back to work next week he wanted to wait and get 1 more injection just prior to him going back which I think is fine he feels like he can return safely        Allergies:   Allergies   Allergen Reactions    Inderal La [Propranolol] Diarrhea and Nausea And Vomiting       Medications:   Home Medications:  Current Outpatient Medications on File Prior to Visit   Medication Sig    acetaminophen (TYLENOL) 500 MG tablet Take 1 tablet by mouth Every 6 (Six) Hours As Needed for Mild Pain.    amLODIPine (NORVASC) 10 MG tablet Take 1 tablet by mouth Daily.    atorvastatin (LIPITOR) 20 MG tablet TAKE 1 TABLET BY MOUTH EVERY DAY    escitalopram (Lexapro) 10 MG tablet Take 1 tablet by mouth Daily.    esomeprazole (nexIUM) 40 MG capsule Take 1 capsule by mouth 2 (Two) Times a Day Before Meals.     No current facility-administered medications on file prior to visit.     Current Medications:  Scheduled Meds:  Continuous Infusions:No current facility-administered medications for this visit.    PRN Meds:.    I have reviewed the patient's medical history in detail and updated the computerized patient record.  Review and summarization of old records include:    Past Medical History:   Diagnosis Date    Acute nephritis     Acute pyelonephritis     VANESSA (acute kidney injury)     Carpal tunnel syndrome     Chronic kidney disease     STAGE 3    Cluster headache     GERD (gastroesophageal reflux disease)     Headache, tension-type     Hyperlipidemia     Hypertension     Migraine     Palpitations     Right shoulder pain     Spinal headache     Syncope         Past Surgical History:   Procedure Laterality Date    BUNIONECTOMY  Right     X3    COLONOSCOPY N/A 11/17/2022    Procedure: COLONOSCOPY TO CECUM AND TERMINAL ILEUM;  Surgeon: Tono Romero MD;  Location: [facility] ENDOSCOPY;  Service: Gastroenterology;  Laterality: N/A;  PRE- SCREENING  POST- HEMORRHOIDS    ENDOSCOPY N/A 11/02/2020    Procedure: ESOPHAGOGASTRODUODENOSCOPY with biopsies;  Surgeon: Tono Romero MD;  Location: [facility] ENDOSCOPY;  Service: Gastroenterology;  Laterality: N/A;  Pre: epigastric pain  Post: duodenitis, gastritis, hiatal hernia    EPIDURAL Right 03/30/2023    Procedure: Right S1 LUMBAR/SACRAL TRANSFORAMINAL EPIDURAL 39736;  Surgeon: Andre Wilson MD;  Location: [facility] OR;  Service: Pain Management;  Laterality: Right;    HERNIA REPAIR      SHOULDER ARTHROSCOPY W/ SUPERIOR LABRAL ANTERIOR POSTERIOR REPAIR Right 05/10/2023    Procedure: RIGHT SHOULDER ARTHROSCOPY, DEBRIDEMENT OF LABRUM AND ROATOR CUFF, AND DECOMPRESSION;  Surgeon: Gardenia Norris MD;  Location: [facility] OR [facility];  Service: Orthopedics;  Laterality: Right;        Social History     Occupational History    Not on file   Tobacco Use    Smoking status: Never    Smokeless tobacco: Never    Tobacco comments:     rare caffeine   Vaping Use    Vaping Use: Never used   Substance and Sexual Activity    Alcohol use: Yes     Comment: Twice a month    Drug use: Yes     Frequency: 1.0 times per week     Types: Marijuana     Comment: uses daily    Sexual activity: Yes     Partners: Female      Social History     Social History Narrative    Not on file        Family History   Problem Relation Age of Onset    Hypertension Mother     No Known Problems Father     No Known Problems Sister     Prostate cancer Maternal Grandfather     Malig Hyperthermia Neg Hx        ROS: 14 point review of systems was performed and was negative except for documented findings in HPI and today's encounter.     Allergies:   Allergies   Allergen Reactions    Inderal La [Propranolol] Diarrhea and Nausea And Vomiting      Constitutional:  Denies fever, shaking or chills   Eyes:  Denies change in visual acuity   HENT:  Denies nasal congestion or sore throat   Respiratory:  Denies cough or shortness of breath   Cardiovascular:  Denies chest pain or severe LE edema   GI:  Denies abdominal pain, nausea, vomiting, bloody stools or diarrhea   Musculoskeletal:  Numbness, tingling, or loss of motor function only as noted above in history of present illness.  : Denies painful urination or hematuria  Integument:  Denies rash, lesion or ulceration   Neurologic:  Denies headache or focal weakness  Endocrine:  Denies lymphadenopathy  Psych:  Denies confusion or change in mental status   Hem:  Denies active bleeding      Physical Exam: 48 y.o. male  Wt Readings from Last 3 Encounters:   09/25/23 84.8 kg (187 lb)   08/21/23 85.6 kg (188 lb 12.8 oz)   07/25/23 84.6 kg (186 lb 6.4 oz)       There is no height or weight on file to calculate BMI.    There were no vitals filed for this visit.  Vital signs reviewed.   General Appearance:    Alert, cooperative, in no acute distress                    Ortho exam      Physical exam of the right shoulder reveals no overlying skin changes no lymphedema no lymphadenopathy.  Patient has active flexion 180 with mild symptoms abduction is similar external rotation is to 50 and internal rotation to the upper lumbar spine with mild symptoms.  Patient has good rotator cuff strength 4+ over 5 with isometric strength testing with pain.  Patient has a positive impingement and a positive Pack sign.  Patient has good cervical range of motion which is full and asymptomatic no radicular symptoms.  Patient has a normal elbow exam.  Good distal pulses are present  Patient has pain with overhead activity and a positive Neer sign and a positive empty can sign , a positive drop arm and a definitive painful arc            Assessment: Status post shoulder arthroscopy I think he is doing well we will go and inject him  today to get this to calm down that last little bit that remains he would like to return to work on Monday without restrictions    Plan: Plan is as above  Follow up as indicated.  Ice, elevate, and rest as needed.  Discussed conservative measures of pain control including ice, bracing.  Gardenia Norris M.D.    Large Joint Arthrocentesis: R subacromial bursa  Date/Time: 10/5/2023 10:34 AM  Consent given by: patient  Site marked: site marked  Timeout: Immediately prior to procedure a time out was called to verify the correct patient, procedure, equipment, support staff and site/side marked as required   Supporting Documentation  Indications: pain   Procedure Details  Location: shoulder - R subacromial bursa  Preparation: Patient was prepped and draped in the usual sterile fashion  Needle gauge: 21G.  Approach: posterior  Medications administered: 80 mg methylPREDNISolone acetate 80 MG/ML; 2 mL lidocaine PF 1% 1 %  Patient tolerance: patient tolerated the procedure well with no immediate complications

## 2023-10-05 ENCOUNTER — TREATMENT (OUTPATIENT)
Dept: PHYSICAL THERAPY | Facility: CLINIC | Age: 48
End: 2023-10-05
Payer: OTHER MISCELLANEOUS

## 2023-10-05 ENCOUNTER — CLINICAL SUPPORT (OUTPATIENT)
Dept: ORTHOPEDIC SURGERY | Facility: CLINIC | Age: 48
End: 2023-10-05
Payer: OTHER MISCELLANEOUS

## 2023-10-05 VITALS — HEIGHT: 71 IN | BODY MASS INDEX: 26.46 KG/M2 | TEMPERATURE: 97.5 F | WEIGHT: 189 LBS

## 2023-10-05 DIAGNOSIS — R29.898 DECREASED STRENGTH OF UPPER EXTREMITY: ICD-10-CM

## 2023-10-05 DIAGNOSIS — Z98.890 S/P ARTHROSCOPY OF RIGHT SHOULDER: ICD-10-CM

## 2023-10-05 DIAGNOSIS — Z98.890 STATUS POST ARTHROSCOPY OF SHOULDER: Primary | ICD-10-CM

## 2023-10-05 DIAGNOSIS — M25.511 ACUTE PAIN OF RIGHT SHOULDER: ICD-10-CM

## 2023-10-05 DIAGNOSIS — M25.611 DECREASED RANGE OF MOTION OF RIGHT SHOULDER: Primary | ICD-10-CM

## 2023-10-05 PROCEDURE — 20610 DRAIN/INJ JOINT/BURSA W/O US: CPT | Performed by: ORTHOPAEDIC SURGERY

## 2023-10-05 RX ADMIN — METHYLPREDNISOLONE ACETATE 80 MG: 80 INJECTION, SUSPENSION INTRA-ARTICULAR; INTRALESIONAL; INTRAMUSCULAR; SOFT TISSUE at 10:34

## 2023-10-05 RX ADMIN — LIDOCAINE HYDROCHLORIDE 2 ML: 10 INJECTION, SOLUTION EPIDURAL; INFILTRATION; INTRACAUDAL; PERINEURAL at 10:34

## 2023-10-05 NOTE — LETTER
October 5, 2023     Patient: Sadi Reyes   YOB: 1975   Date of Visit: 10/5/2023       To Whom It May Concern:    It is my medical opinion that Sadi Reyes  can RTW on Monday, no restrictions.           Sincerely,        Gardenia Nroris MD    CC:   No Recipients

## 2023-10-05 NOTE — PROGRESS NOTES
Physical Therapy Re-Assessment Note                                            [address] [facility] [facility] [address] [facility], [address]                                                           [place], [place]                                                         [phone]    Patient: Sadi Reyes   : 1975  Diagnosis/ICD-10 Code:  Decreased range of motion of right shoulder [M25.611]  Referring practitioner: Gardenia Norris MD  Date of Initial Visit: Type: THERAPY  Noted: 2023  Today's Date: 10/5/2023  Patient seen for 24 sessions           Subjective Evaluation    History of Present Illness  Mechanism of injury: Sadi reports that because his session is in the morning, he is feeling more sore and he is experiencing a burning sensation along his AC joint anteriorly. He states that his R shoulder pain is typically slightly worse in the morning due to rolling onto his R shoulder in the night, but as his day goes on his pain will dissipate to 0 most often.     His main complaint with his R shoulder pain is stiffness and achiness in the morning. He also continues to experience sharp or pulling pains with quick, outstretched reaching movements. Sadi states that when this pain occurs, he is typically able to rest his R shoulder and the pain will go away after a few minutes.        Quick DASH: 15.91      Pain  Current pain ratin  At best pain ratin  At worst pain ratin       Objective     Strength/Myotome Testing      Left Shoulder      Planes of Motion   Flexion: 5   Abduction: 5   External rotation at 0°: 5   Internal rotation at 0°: 5      Right Shoulder      Planes of Motion   Flexion: 5   Abduction: 5   External rotation at 0°: 5   Internal rotation at 0°: 5        Active Range of Motion   Left Shoulder   Normal active range of motion    Right Shoulder   Flexion: 150 degrees with pain  Abduction: 110 degrees with pain  External rotation 0°: 80 degrees with pain     Continued/ New Goals  1. Pt will be  independent with progressed HEP- Met as stated  2. Pt will report >/= 90% decrease in R shoulder pain during all active movement- Partially met: his pain is at a 0/10 throughout most of his day, however he continues to experience intermittent sharp pains with quick movements and has significant pain in the morning after sleeping   3. Pt will report ability to open his grand child's sippy cup without increased R shoulder pain for indication of improved ability to perform ADLs and improved quality of life- MET  4. Pt will perform lifting and twisting with a 15-20lb box to an overhead shelf x 10 repetitions without significant reports of R shoulder pain (<2/10) for indication of ability to return to work without restrictions- Met as stated    See Exercise, Manual, and Modality Logs for complete treatment.     Assessment & Plan       Assessment  Assessment details: Time spent re-assessing all goals and objective measurements. Sadi has met all of his short and long term goals at this time with exception of his pain goal. He continues to experience significant pain in his R shoulder each morning after waking up and he also has several instances throughout the day where he has sharp pains with quick movements outside his base of support. Since initial evaluation, Sadi has made significant improvements in his R shoulder AROM, though lacks full functional R shoulder abduction AROM due to pain. The past few weeks of formal PT sessions have consisted mostly of scapular strengthening and functional or work-like movements as he prepares to return to work next week. aSdi is receiving a R shoulder cortisone injection after his session today and this will hopefully help to reduce his R shoulder pain. He was encouraged to continue with his current HEP consisting of R shoulder and scapular strengthening as well as AAROM for IR and abduction. Plan to discharge at this time.       Anticipate DC next Visit           Manual  Therapy:    0     mins  65293;  Therapeutic Exercise:    25     mins  69920;     Neuromuscular Melvin:    0    mins  26063;    Therapeutic Activity:     10     mins  19559;     Gait Trainin     mins  46809;     Ultrasound:     0     mins  50065;    Electrical Stimulation:    0     mins  95428 ( );  Dry Needling     0     mins self-pay    Timed Treatment:   35   mins   Total Treatment:     35   mins    Biju Mora PT, DPT  Physical Therapist  [place] License #: [id number]  Biju Mora PT, 10/05/23, 8:34 AM EDT

## 2023-10-09 ENCOUNTER — TELEPHONE (OUTPATIENT)
Dept: ORTHOPEDIC SURGERY | Facility: CLINIC | Age: 48
End: 2023-10-09

## 2023-10-09 RX ORDER — METHYLPREDNISOLONE ACETATE 80 MG/ML
80 INJECTION, SUSPENSION INTRA-ARTICULAR; INTRALESIONAL; INTRAMUSCULAR; SOFT TISSUE
Status: COMPLETED | OUTPATIENT
Start: 2023-10-05 | End: 2023-10-05

## 2023-10-09 RX ORDER — LIDOCAINE HYDROCHLORIDE 10 MG/ML
2 INJECTION, SOLUTION EPIDURAL; INFILTRATION; INTRACAUDAL; PERINEURAL
Status: COMPLETED | OUTPATIENT
Start: 2023-10-05 | End: 2023-10-05

## 2023-10-09 NOTE — TELEPHONE ENCOUNTER
Caller: [employer].    Relationship: Other    Best call back number: [phone]     What form or medical record are you requesting: OFFICE NOTES AND WORK STATUS     Who is requesting this form or medical record from you: [employer]    How would you like to receive the form or medical records (pick-up, mail, fax): FAX  If fax, what is the fax number: [phone]    Timeframe paperwork needed: ASAP    Additional notes:  OCTOBER 5TH OFFICE NOTES AND WORK STATUS

## 2023-11-06 NOTE — PROGRESS NOTES
Patient: Sadi Reyes  YOB: 1975  Date of Service: 11/6/2023    Chief Complaints: Right shoulder pain    Subjective:    History of Present Illness: Pt is seen in the office today with complaints of right shoulder pain he has been back to work doing well the shot did help him he states he still has a little bit of pain the pain that he had before surgery is gone is a little bit remaining anteriorly but overall happy with where he is        Allergies:   Allergies   Allergen Reactions    Inderal La [Propranolol] Diarrhea and Nausea And Vomiting       Medications:   Home Medications:  Current Outpatient Medications on File Prior to Visit   Medication Sig    acetaminophen (TYLENOL) 500 MG tablet Take 1 tablet by mouth Every 6 (Six) Hours As Needed for Mild Pain.    amLODIPine (NORVASC) 10 MG tablet Take 1 tablet by mouth Daily.    atorvastatin (LIPITOR) 20 MG tablet TAKE 1 TABLET BY MOUTH EVERY DAY    escitalopram (Lexapro) 10 MG tablet Take 1 tablet by mouth Daily.    esomeprazole (nexIUM) 40 MG capsule Take 1 capsule by mouth 2 (Two) Times a Day Before Meals.     No current facility-administered medications on file prior to visit.     Current Medications:  Scheduled Meds:  Continuous Infusions:No current facility-administered medications for this visit.    PRN Meds:.    I have reviewed the patient's medical history in detail and updated the computerized patient record.  Review and summarization of old records include:    Past Medical History:   Diagnosis Date    Acute nephritis     Acute pyelonephritis     VANESSA (acute kidney injury)     Carpal tunnel syndrome     Chronic kidney disease     STAGE 3    Cluster headache     GERD (gastroesophageal reflux disease)     Headache, tension-type     Hyperlipidemia     Hypertension     Migraine     Palpitations     Right shoulder pain     Spinal headache     Syncope         Past Surgical History:   Procedure Laterality Date    BUNIONECTOMY Right     X3     COLONOSCOPY N/A 11/17/2022    Procedure: COLONOSCOPY TO CECUM AND TERMINAL ILEUM;  Surgeon: Tono Romero MD;  Location: [facility] ENDOSCOPY;  Service: Gastroenterology;  Laterality: N/A;  PRE- SCREENING  POST- HEMORRHOIDS    ENDOSCOPY N/A 11/02/2020    Procedure: ESOPHAGOGASTRODUODENOSCOPY with biopsies;  Surgeon: Tono Romero MD;  Location: [facility] ENDOSCOPY;  Service: Gastroenterology;  Laterality: N/A;  Pre: epigastric pain  Post: duodenitis, gastritis, hiatal hernia    EPIDURAL Right 03/30/2023    Procedure: Right S1 LUMBAR/SACRAL TRANSFORAMINAL EPIDURAL 72654;  Surgeon: Andre Wilson MD;  Location: [facility] MAIN OR;  Service: Pain Management;  Laterality: Right;    HERNIA REPAIR      SHOULDER ARTHROSCOPY W/ SUPERIOR LABRAL ANTERIOR POSTERIOR REPAIR Right 05/10/2023    Procedure: RIGHT SHOULDER ARTHROSCOPY, DEBRIDEMENT OF LABRUM AND ROATOR CUFF, AND DECOMPRESSION;  Surgeon: Gardenia Norris MD;  Location: [facility] OR [facility];  Service: Orthopedics;  Laterality: Right;        Social History     Occupational History    Not on file   Tobacco Use    Smoking status: Never    Smokeless tobacco: Never    Tobacco comments:     rare caffeine   Vaping Use    Vaping Use: Never used   Substance and Sexual Activity    Alcohol use: Yes     Comment: Twice a month    Drug use: Yes     Frequency: 1.0 times per week     Types: Marijuana     Comment: uses daily    Sexual activity: Yes     Partners: Female      Social History     Social History Narrative    Not on file        Family History   Problem Relation Age of Onset    Hypertension Mother     No Known Problems Father     No Known Problems Sister     Prostate cancer Maternal Grandfather     Malig Hyperthermia Neg Hx        ROS: 14 point review of systems was performed and was negative except for documented findings in HPI and today's encounter.     Allergies:   Allergies   Allergen Reactions    Inderal La [Propranolol] Diarrhea and Nausea And Vomiting     Constitutional:   Denies fever, shaking or chills   Eyes:  Denies change in visual acuity   HENT:  Denies nasal congestion or sore throat   Respiratory:  Denies cough or shortness of breath   Cardiovascular:  Denies chest pain or severe LE edema   GI:  Denies abdominal pain, nausea, vomiting, bloody stools or diarrhea   Musculoskeletal:  Numbness, tingling, or loss of motor function only as noted above in history of present illness.  : Denies painful urination or hematuria  Integument:  Denies rash, lesion or ulceration   Neurologic:  Denies headache or focal weakness  Endocrine:  Denies lymphadenopathy  Psych:  Denies confusion or change in mental status   Hem:  Denies active bleeding      Physical Exam: 48 y.o. male  Wt Readings from Last 3 Encounters:   10/05/23 85.7 kg (189 lb)   09/25/23 84.8 kg (187 lb)   08/21/23 85.6 kg (188 lb 12.8 oz)       There is no height or weight on file to calculate BMI.    There were no vitals filed for this visit.  Vital signs reviewed.   General Appearance:    Alert, cooperative, in no acute distress                    Ortho exam    Physical exam of the right shoulder reveals no overlying skin changes no lymphedema no lymphadenopathy.  The patient can actively flex to 180, abduction is similar external rotation is to 50, internal rotation to the upper lumbar spine.  Rotator cuff strength is 4+ to 5 over 5 with isometric strength testing without symptoms.  The patient has good cervical range of motion full and asymptomatic no radicular symptoms and a normal elbow exam.  Patient has good distal pulses       X-rays AP scapular Y and x-ray lateral the right shoulder were taken to evaluate his symptoms and compared x-rays done approxi-1 year ago he has some mild narrowing of his acromioclavicular joint otherwise no acute pathology is seen no real change      Assessment: Status post shoulder arthroscopy he is deftly better than he was before he is back to doing his job I will release him to work  without restrictions as long as he is doing well he can follow-up as needed    Plan: Is as above  Follow up as indicated.  Ice, elevate, and rest as needed.  Discussed conservative measures of pain control including ice, bracing.    Gardenia Norris M.D.

## 2023-11-07 ENCOUNTER — OFFICE VISIT (OUTPATIENT)
Dept: ORTHOPEDIC SURGERY | Facility: CLINIC | Age: 48
End: 2023-11-07
Payer: OTHER MISCELLANEOUS

## 2023-11-07 VITALS — HEIGHT: 71 IN | BODY MASS INDEX: 26.31 KG/M2 | WEIGHT: 187.9 LBS | TEMPERATURE: 97.8 F

## 2023-11-07 DIAGNOSIS — Z98.890 S/P ARTHROSCOPY OF SHOULDER: ICD-10-CM

## 2023-11-07 DIAGNOSIS — R52 PAIN: Primary | ICD-10-CM

## 2023-11-07 NOTE — LETTER
November 7, 2023     Patient: Sadi Reyes   YOB: 1975   Date of Visit: 11/7/2023       To Whom It May Concern:    It is my medical opinion that Sadi Graves  can work without restrictions.  I will place him at [facility]..           Sincerely,        Gardenia Norris MD    CC:   No Recipients

## 2023-11-17 ENCOUNTER — DOCUMENTATION (OUTPATIENT)
Dept: PHYSICAL THERAPY | Facility: CLINIC | Age: 48
End: 2023-11-17
Payer: COMMERCIAL

## 2023-12-18 NOTE — PROGRESS NOTES
"Subjective   Sadi Reyes is a 48 y.o. male presenting for follow up. He has a history of tension headache, bilateral occipital neuralgia, and vasovagal syncope.     He is taking Lexapro 10 mg daily for his migraines, however he says he only takes this about 3 days a week, on an \"as needed\" basis. He has done this in the past and I recommended taking it daily.     His occipital neuralgia has not bothered him much since his last visit. We have discussed the option of an occipital nerve block if needed should this flare up.     When I saw him initially he described syncopal episodes.  I felt they were likely vasovagal syncope.  I referred him to cardiology and they agreed.  He does get fairly obvious warning signs of presyncope prior to these episodes.  Since he is aware of this he has been able to stop these from occurring by either sitting or lying down when he feels this way.  He denies any of these episodes since his last visit.    History of Present Illness     Review of Systems    I have reviewed and confirmed the accuracy of the patient's history: Chief complaint, HPI, ROS, Subjective, and Past Family Social History as entered by the MA/LPN/RN. Irma Amezcua MA 12/18/23      Objective     Physical Examination:  General Appearance:  Well developed, well nourished, well groomed, alert, and cooperative.  HEENT: Normocephalic.    Neck and Spine: Normal range of motion.  Normal alignment. No mass or tenderness. No bruits.  Cardiac: Regular rate and rhythm. No murmurs.  Peripheral Vasculature: Radial and pedal pulses are equal and symmetric.   Extremities: No edema or deformities. Normal joint ROM.  Skin: No rashes or birth marks.      Neurological examination:   Higher Integrative Function: Oriented to time, place, and person. Normal registration, recall attention span and concentration. Normal language including comprehension, spontaneous speech, repetition, reading, writing, naming and vocabulary. No " neglect with normal visual-spatial function and construction. Normal fund of knowledge and higher integrative function.   CN II: Pupils are equal, round and reactive to light. Normal visual acuity and visual fields.   CN III IV VI: Extraocular movements are full without nystagmus.   CN V: Normal facial sensation and strength of muscles of mastication.   CN VII: Facial movements are symmetric. No weakness.   CN VIII: Auditory acuity is normal.  CN IX & X: Symmetric palatal movement.   CN XI: Sternocleidomastoid and trapezius are normal. No weakness.   CN XII: The tongue is midline. No atrophy or fasciculations.  Motor: Normal muscle strength, bulk and tone in upper and lower extremities. No fasciculations, rigidity, spasticity, or abnormal movements.   Reflexes: 2+ in the upper and lower extremities. Plantar responses are flexor.   Sensation: Normal light touch, pinprick, vibration, temperature, and proprioception in the arms and legs.   Station and Gait: Normal gait and station.   Coordination: Finger to nose test shows no dysmetria. Rapid alternating movements are normal. Heel to shin is normal.           Assessment & Plan   Diagnoses and all orders for this visit:    1. Bilateral occipital neuralgia (Primary)    2. Anxiety    3. Vasovagal syncope    Other orders  -     escitalopram (Lexapro) 10 MG tablet; Take 1 tablet by mouth Daily.  Dispense: 90 tablet; Refill: 3     He is doing well. The Lexapro continues to work well for him. I did recommend he take this daily rather than as needed. We have had this discussion in the past.     Follow up annually, sooner if needed.

## 2023-12-19 ENCOUNTER — OFFICE VISIT (OUTPATIENT)
Dept: NEUROLOGY | Facility: CLINIC | Age: 48
End: 2023-12-19
Payer: COMMERCIAL

## 2023-12-19 VITALS
OXYGEN SATURATION: 98 % | BODY MASS INDEX: 27.27 KG/M2 | HEIGHT: 71 IN | SYSTOLIC BLOOD PRESSURE: 120 MMHG | DIASTOLIC BLOOD PRESSURE: 60 MMHG | WEIGHT: 194.8 LBS | HEART RATE: 78 BPM

## 2023-12-19 DIAGNOSIS — R55 VASOVAGAL SYNCOPE: ICD-10-CM

## 2023-12-19 DIAGNOSIS — F41.9 ANXIETY: ICD-10-CM

## 2023-12-19 DIAGNOSIS — M54.81 BILATERAL OCCIPITAL NEURALGIA: Primary | ICD-10-CM

## 2023-12-19 PROCEDURE — 99213 OFFICE O/P EST LOW 20 MIN: CPT | Performed by: NURSE PRACTITIONER

## 2023-12-19 RX ORDER — ESCITALOPRAM OXALATE 10 MG/1
10 TABLET ORAL DAILY
Qty: 90 TABLET | Refills: 3 | Status: SHIPPED | OUTPATIENT
Start: 2023-12-19 | End: 2024-12-18

## 2023-12-19 NOTE — LETTER
"December 19, 2023       No Recipients    Patient: Sadi Reyes   YOB: 1975   Date of Visit: 12/19/2023     Dear Alek Estrada MD:       Thank you for referring Sadi Reyes to me for evaluation. Below are the relevant portions of my assessment and plan of care.    If you have questions, please do not hesitate to call me. I look forward to following Sadi along with you.         Sincerely,        CHARI Robbnis        CC:   No Recipients    Rony Arrieta APRN  12/19/23 1438  Sign when Signing Visit  Subjective  Sadi Reyes is a 48 y.o. male presenting for follow up. He has a history of tension headache, bilateral occipital neuralgia, and vasovagal syncope.     He is taking Lexapro 10 mg daily for his migraines, however he says he only takes this about 3 days a week, on an \"as needed\" basis. He has done this in the past and I recommended taking it daily.     His occipital neuralgia has not bothered him much since his last visit. We have discussed the option of an occipital nerve block if needed should this flare up.     When I saw him initially he described syncopal episodes.  I felt they were likely vasovagal syncope.  I referred him to cardiology and they agreed.  He does get fairly obvious warning signs of presyncope prior to these episodes.  Since he is aware of this he has been able to stop these from occurring by either sitting or lying down when he feels this way.  He denies any of these episodes since his last visit.    History of Present Illness     Review of Systems    I have reviewed and confirmed the accuracy of the patient's history: Chief complaint, HPI, ROS, Subjective, and Past Family Social History as entered by the MA/LPLYLE/RN. Irma Amezcua MA 12/18/23      Objective    Physical Examination:  General Appearance:  Well developed, well nourished, well groomed, alert, and cooperative.  HEENT: Normocephalic.    Neck and Spine: Normal range of motion.  " Normal alignment. No mass or tenderness. No bruits.  Cardiac: Regular rate and rhythm. No murmurs.  Peripheral Vasculature: Radial and pedal pulses are equal and symmetric.   Extremities: No edema or deformities. Normal joint ROM.  Skin: No rashes or birth marks.      Neurological examination:   Higher Integrative Function: Oriented to time, place, and person. Normal registration, recall attention span and concentration. Normal language including comprehension, spontaneous speech, repetition, reading, writing, naming and vocabulary. No neglect with normal visual-spatial function and construction. Normal fund of knowledge and higher integrative function.   CN II: Pupils are equal, round and reactive to light. Normal visual acuity and visual fields.   CN III IV VI: Extraocular movements are full without nystagmus.   CN V: Normal facial sensation and strength of muscles of mastication.   CN VII: Facial movements are symmetric. No weakness.   CN VIII: Auditory acuity is normal.  CN IX & X: Symmetric palatal movement.   CN XI: Sternocleidomastoid and trapezius are normal. No weakness.   CN XII: The tongue is midline. No atrophy or fasciculations.  Motor: Normal muscle strength, bulk and tone in upper and lower extremities. No fasciculations, rigidity, spasticity, or abnormal movements.   Reflexes: 2+ in the upper and lower extremities. Plantar responses are flexor.   Sensation: Normal light touch, pinprick, vibration, temperature, and proprioception in the arms and legs.   Station and Gait: Normal gait and station.   Coordination: Finger to nose test shows no dysmetria. Rapid alternating movements are normal. Heel to shin is normal.           Assessment & Plan  Diagnoses and all orders for this visit:    1. Bilateral occipital neuralgia (Primary)    2. Anxiety    3. Vasovagal syncope    Other orders  -     escitalopram (Lexapro) 10 MG tablet; Take 1 tablet by mouth Daily.  Dispense: 90 tablet; Refill: 3     He is doing  well. The Lexapro continues to work well for him. I did recommend he take this daily rather than as needed. We have had this discussion in the past.     Follow up annually, sooner if needed.

## 2024-01-08 ENCOUNTER — OFFICE VISIT (OUTPATIENT)
Age: 49
End: 2024-01-08
Payer: COMMERCIAL

## 2024-01-08 VITALS
SYSTOLIC BLOOD PRESSURE: 126 MMHG | HEART RATE: 63 BPM | BODY MASS INDEX: 27.02 KG/M2 | DIASTOLIC BLOOD PRESSURE: 70 MMHG | HEIGHT: 71 IN | WEIGHT: 193 LBS

## 2024-01-08 DIAGNOSIS — R55 VASOVAGAL SYNCOPE: ICD-10-CM

## 2024-01-08 DIAGNOSIS — I51.89 DIASTOLIC DYSFUNCTION WITHOUT HEART FAILURE: Primary | ICD-10-CM

## 2024-01-08 DIAGNOSIS — I10 PRIMARY HYPERTENSION: Chronic | ICD-10-CM

## 2024-01-08 DIAGNOSIS — R07.89 NON-CARDIAC CHEST PAIN: ICD-10-CM

## 2024-01-08 NOTE — PROGRESS NOTES
Subjective:     Encounter Date:01/08/2024      Patient ID: Sadi Reyes is a 48 y.o. male.    Chief Complaint:follow up diastolic CHF, syncope  History of Present Illness  This is a 49 y/o man who follows with Dr. Qureshi and is new to me today. He has a pmhx of hypertension, chronic kidney disease, hyperlipidemia, chronic diastolic CHF and vasovagal syncope.     He is here today for a follow up visit. He has been doing well since last seen with no further syncopal events. He denies any chest pain, shortness of breath, palpitations, or dizziness. He denies any swelling in his lower extremities, orthopnea or PND. He is pretty active and his work requires quite a bit of exertion. He denies any symptoms with this.    Prior history:  Dr. Qureshi began following the patient in October 2020 when he presented with epigastric/chest pain along with nausea and vomiting. His work-up in the ED showed evidence of acute kidney injury. A CT of the abdomen and pelvis showed possible pyelonephritis. Troponins were negative. EKG was stable and echocardiogram showed normal left ventricular systolic function wall motion with an EF of 60% and grade 3 diastolic dysfunction with no significant valvular disease.  He ended up undergoing an EGD that showed evidence of gastritis and duodenitis.  He was started on pantoprazole and Carafate with improvement in his symptoms.  His blood pressure medications were also adjusted during his stay.     He was seen urgently in January 2021 for follow-up for syncope. He reported 2 episodes of syncope. Both episodes were associated with nausea and lightheadedness prior to passing out. If was felt that his symptoms were due to vasovagal syncope and he was instructed on maneuvers to avoid syncope.    Then in May 2021, he was seen in [facility] ED for near syncope. He was visiting his daughter who had just given birth. ED workup was unremarkable and he was sent home.     He was last seen by Dr. Qureshi in  January 2023 and was doing well. No changes were made.    I have reviewed and updated as appropriate allergies, current medications, past family history, past medical history, past surgical history and problem list.    Review of Systems   Constitutional: Negative for fever, malaise/fatigue, weight gain and weight loss.   HENT:  Negative for congestion, hoarse voice and sore throat.    Eyes:  Negative for blurred vision and double vision.   Cardiovascular:  Negative for chest pain, dyspnea on exertion, leg swelling, orthopnea, palpitations and syncope.   Respiratory:  Negative for cough, shortness of breath and wheezing.    Gastrointestinal:  Negative for abdominal pain, hematemesis, hematochezia and melena.   Genitourinary:  Negative for dysuria and hematuria.   Neurological:  Negative for dizziness, headaches, light-headedness and numbness.   Psychiatric/Behavioral:  Negative for depression. The patient is not nervous/anxious.          Current Outpatient Medications:     acetaminophen (TYLENOL) 500 MG tablet, Take 1 tablet by mouth Every 6 (Six) Hours As Needed for Mild Pain., Disp: , Rfl:     amLODIPine (NORVASC) 10 MG tablet, Take 1 tablet by mouth Daily., Disp: 30 tablet, Rfl: 4    atorvastatin (LIPITOR) 20 MG tablet, TAKE 1 TABLET BY MOUTH EVERY DAY, Disp: 90 tablet, Rfl: 0    escitalopram (Lexapro) 10 MG tablet, Take 1 tablet by mouth Daily., Disp: 90 tablet, Rfl: 3    esomeprazole (nexIUM) 40 MG capsule, Take 1 capsule by mouth 2 (Two) Times a Day Before Meals., Disp: 60 capsule, Rfl: 12    Past Medical History:   Diagnosis Date    Acute nephritis     Acute pyelonephritis     VANESSA (acute kidney injury)     Carpal tunnel syndrome     Chronic kidney disease     STAGE 3    Cluster headache     GERD (gastroesophageal reflux disease)     Headache, tension-type     Hyperlipidemia     Hypertension     Migraine     Nausea vomiting and diarrhea 10/28/2020    Palpitations     Right shoulder pain     Spinal headache      Syncope        Past Surgical History:   Procedure Laterality Date    BUNIONECTOMY Right     X3    COLONOSCOPY N/A 11/17/2022    Procedure: COLONOSCOPY TO CECUM AND TERMINAL ILEUM;  Surgeon: Tono Romero MD;  Location: [facility] ENDOSCOPY;  Service: Gastroenterology;  Laterality: N/A;  PRE- SCREENING  POST- HEMORRHOIDS    ENDOSCOPY N/A 11/02/2020    Procedure: ESOPHAGOGASTRODUODENOSCOPY with biopsies;  Surgeon: Tono Romero MD;  Location: [facility] ENDOSCOPY;  Service: Gastroenterology;  Laterality: N/A;  Pre: epigastric pain  Post: duodenitis, gastritis, hiatal hernia    EPIDURAL Right 03/30/2023    Procedure: Right S1 LUMBAR/SACRAL TRANSFORAMINAL EPIDURAL 70476;  Surgeon: Andre Wilson MD;  Location: [facility] OR;  Service: Pain Management;  Laterality: Right;    HERNIA REPAIR      SHOULDER ARTHROSCOPY W/ SUPERIOR LABRAL ANTERIOR POSTERIOR REPAIR Right 05/10/2023    Procedure: RIGHT SHOULDER ARTHROSCOPY, DEBRIDEMENT OF LABRUM AND ROATOR CUFF, AND DECOMPRESSION;  Surgeon: Gardenia Norris MD;  Location: [facility] OR [facility] [place];  Service: Orthopedics;  Laterality: Right;       Family History   Problem Relation Age of Onset    Hypertension Mother     No Known Problems Father     No Known Problems Sister     Prostate cancer Maternal Grandfather     Malig Hyperthermia Neg Hx        Social History     Tobacco Use    Smoking status: Never    Smokeless tobacco: Never    Tobacco comments:     rare caffeine   Vaping Use    Vaping Use: Never used   Substance Use Topics    Alcohol use: Yes     Comment: Twice a month    Drug use: Yes     Frequency: 1.0 times per week     Types: Marijuana     Comment: uses daily         ECG 12 Lead    Date/Time: 1/8/2024 2:08 PM  Performed by: Ewa Roblero APRN    Authorized by: Ewa Roblero APRN  Comparison: compared with previous ECG from 1/6/2023  Rhythm: sinus rhythm             Objective:     Visit Vitals  /70 (BP Location: Left arm, Patient Position: Sitting)   Pulse 63   Ht  "180.3 cm (71\")   Wt 87.5 kg (193 lb)   BMI 26.92 kg/m²             Physical Exam  Constitutional:       Appearance: Normal appearance. He is normal weight.   HENT:      Head: Normocephalic.   Neck:      Vascular: No carotid bruit.   Cardiovascular:      Rate and Rhythm: Normal rate and regular rhythm.      Chest Wall: PMI is not displaced.      Pulses: Normal pulses.           Radial pulses are 2+ on the right side and 2+ on the left side.        Posterior tibial pulses are 2+ on the right side and 2+ on the left side.      Heart sounds: Normal heart sounds. No murmur heard.     No friction rub. No gallop.   Pulmonary:      Effort: Pulmonary effort is normal.      Breath sounds: Normal breath sounds.   Abdominal:      General: Bowel sounds are normal. There is no distension.      Palpations: Abdomen is soft.   Musculoskeletal:      Right lower leg: No edema.      Left lower leg: No edema.   Skin:     General: Skin is warm and dry.      Capillary Refill: Capillary refill takes less than 2 seconds.   Neurological:      Mental Status: He is alert and oriented to person, place, and time.   Psychiatric:         Mood and Affect: Mood normal.         Behavior: Behavior normal.         Thought Content: Thought content normal.          Lab Review:         Cardiac Procedures:       Assessment:         Diagnoses and all orders for this visit:    1. Diastolic dysfunction without heart failure (Primary)    2. Vasovagal syncope    3. Primary hypertension    4. Non-cardiac chest pain            Plan:       HTN: blood pressure well controlled. No changes  History of HFpEF: appears compensated on exam. No diuretics required.  Vasovagal syncope: no recent episodes  CKD    Thank you for allowing me to participate in this patient's care. Please call with any questions or concerns. Mr. Reyes will follow up with Dr. Qureshi in 1 year. If he is still doing well, may consider seeing him on an as needed basis.          Your medication list "            Accurate as of January 8, 2024 12:44 PM. If you have any questions, ask your nurse or doctor.                CONTINUE taking these medications        Instructions Last Dose Given Next Dose Due   acetaminophen 500 MG tablet  Commonly known as: TYLENOL      Take 1 tablet by mouth Every 6 (Six) Hours As Needed for Mild Pain.       amLODIPine 10 MG tablet  Commonly known as: NORVASC      Take 1 tablet by mouth Daily.       atorvastatin 20 MG tablet  Commonly known as: LIPITOR      TAKE 1 TABLET BY MOUTH EVERY DAY       escitalopram 10 MG tablet  Commonly known as: Lexapro      Take 1 tablet by mouth Daily.       esomeprazole 40 MG capsule  Commonly known as: nexIUM      Take 1 capsule by mouth 2 (Two) Times a Day Before Meals.                  Ewa Roblero, CHARI  01/08/24  12:44 PM EST

## 2024-04-03 ENCOUNTER — TELEPHONE (OUTPATIENT)
Dept: ORTHOPEDIC SURGERY | Facility: CLINIC | Age: 49
End: 2024-04-03
Payer: COMMERCIAL

## 2024-07-18 ENCOUNTER — OFFICE VISIT (OUTPATIENT)
Dept: ORTHOPEDIC SURGERY | Facility: CLINIC | Age: 49
End: 2024-07-18
Payer: OTHER MISCELLANEOUS

## 2024-07-18 VITALS — WEIGHT: 191 LBS | BODY MASS INDEX: 26.74 KG/M2 | HEIGHT: 71 IN | TEMPERATURE: 98.6 F

## 2024-07-18 DIAGNOSIS — M75.41 IMPINGEMENT SYNDROME OF RIGHT SHOULDER: Primary | ICD-10-CM

## 2024-07-18 RX ORDER — LIDOCAINE HYDROCHLORIDE 10 MG/ML
2 INJECTION, SOLUTION EPIDURAL; INFILTRATION; INTRACAUDAL; PERINEURAL
Status: COMPLETED | OUTPATIENT
Start: 2024-07-18 | End: 2024-07-18

## 2024-07-18 RX ORDER — METHYLPREDNISOLONE ACETATE 80 MG/ML
80 INJECTION, SUSPENSION INTRA-ARTICULAR; INTRALESIONAL; INTRAMUSCULAR; SOFT TISSUE
Status: COMPLETED | OUTPATIENT
Start: 2024-07-18 | End: 2024-07-18

## 2024-07-18 RX ADMIN — LIDOCAINE HYDROCHLORIDE 2 ML: 10 INJECTION, SOLUTION EPIDURAL; INFILTRATION; INTRACAUDAL; PERINEURAL at 16:08

## 2024-07-18 RX ADMIN — METHYLPREDNISOLONE ACETATE 80 MG: 80 INJECTION, SUSPENSION INTRA-ARTICULAR; INTRALESIONAL; INTRAMUSCULAR; SOFT TISSUE at 16:08

## 2024-07-18 NOTE — PROGRESS NOTES
Patient: Sadi Reyes  YOB: 1975  Date of Service: 7/18/2024    Chief Complaints: Right shoulder pain    Subjective:    History of Present Illness: Pt is seen in the office today with complaints of right shoulder pain seen him in the past for impingement we last saw him in October I injected him he has been back at work he recently just did not me and he states at that time he had some pain and the physician that did that CHAU recommended he get another injection.  He liked the idea does think it helped him previously he is at work without restrictions        Allergies:   Allergies   Allergen Reactions    Inderal La [Propranolol] Diarrhea and Nausea And Vomiting       Medications:   Home Medications:  Current Outpatient Medications on File Prior to Visit   Medication Sig    acetaminophen (TYLENOL) 500 MG tablet Take 1 tablet by mouth Every 6 (Six) Hours As Needed for Mild Pain.    amLODIPine (NORVASC) 10 MG tablet Take 1 tablet by mouth Daily.    atorvastatin (LIPITOR) 20 MG tablet TAKE 1 TABLET BY MOUTH EVERY DAY    escitalopram (Lexapro) 10 MG tablet Take 1 tablet by mouth Daily.    esomeprazole (nexIUM) 40 MG capsule Take 1 capsule by mouth 2 (Two) Times a Day Before Meals.     No current facility-administered medications on file prior to visit.     Current Medications:  Scheduled Meds:  Continuous Infusions:No current facility-administered medications for this visit.    PRN Meds:.    I have reviewed the patient's medical history in detail and updated the computerized patient record.  Review and summarization of old records include:    Past Medical History:   Diagnosis Date    Acute nephritis     Acute pyelonephritis     VANESSA (acute kidney injury)     Carpal tunnel syndrome     Chronic kidney disease     STAGE 3    Cluster headache     GERD (gastroesophageal reflux disease)     Headache, tension-type     Hyperlipidemia     Hypertension     Migraine     Nausea vomiting and diarrhea 10/28/2020     Palpitations     Right shoulder pain     Spinal headache     Syncope         Past Surgical History:   Procedure Laterality Date    BUNIONECTOMY Right     X3    COLONOSCOPY N/A 11/17/2022    Procedure: COLONOSCOPY TO CECUM AND TERMINAL ILEUM;  Surgeon: Tono Romero MD;  Location: [facility] ENDOSCOPY;  Service: Gastroenterology;  Laterality: N/A;  PRE- SCREENING  POST- HEMORRHOIDS    ENDOSCOPY N/A 11/02/2020    Procedure: ESOPHAGOGASTRODUODENOSCOPY with biopsies;  Surgeon: Tono Romero MD;  Location: [facility] ENDOSCOPY;  Service: Gastroenterology;  Laterality: N/A;  Pre: epigastric pain  Post: duodenitis, gastritis, hiatal hernia    EPIDURAL Right 03/30/2023    Procedure: Right S1 LUMBAR/SACRAL TRANSFORAMINAL EPIDURAL 39058;  Surgeon: Andre Wilson MD;  Location: [facility] OR;  Service: Pain Management;  Laterality: Right;    HERNIA REPAIR      SHOULDER ARTHROSCOPY W/ SUPERIOR LABRAL ANTERIOR POSTERIOR REPAIR Right 05/10/2023    Procedure: RIGHT SHOULDER ARTHROSCOPY, DEBRIDEMENT OF LABRUM AND ROATOR CUFF, AND DECOMPRESSION;  Surgeon: Gardenia Norris MD;  Location: [facility];  Service: Orthopedics;  Laterality: Right;        Social History     Occupational History    Not on file   Tobacco Use    Smoking status: Never    Smokeless tobacco: Never    Tobacco comments:     rare caffeine   Vaping Use    Vaping status: Never Used   Substance and Sexual Activity    Alcohol use: Yes     Comment: Twice a month    Drug use: Yes     Frequency: 1.0 times per week     Types: Marijuana     Comment: uses daily    Sexual activity: Yes     Partners: Female      Social History     Social History Narrative    Not on file        Family History   Problem Relation Age of Onset    Hypertension Mother     No Known Problems Father     No Known Problems Sister     Prostate cancer Maternal Grandfather     Malig Hyperthermia Neg Hx        ROS: 14 point review of systems was performed and was negative except for documented findings  in HPI and today's encounter.     Allergies:   Allergies   Allergen Reactions    Inderal La [Propranolol] Diarrhea and Nausea And Vomiting     Constitutional:  Denies fever, shaking or chills   Eyes:  Denies change in visual acuity   HENT:  Denies nasal congestion or sore throat   Respiratory:  Denies cough or shortness of breath   Cardiovascular:  Denies chest pain or severe LE edema   GI:  Denies abdominal pain, nausea, vomiting, bloody stools or diarrhea   Musculoskeletal:  Numbness, tingling, or loss of motor function only as noted above in history of present illness.  : Denies painful urination or hematuria  Integument:  Denies rash, lesion or ulceration   Neurologic:  Denies headache or focal weakness  Endocrine:  Denies lymphadenopathy  Psych:  Denies confusion or change in mental status   Hem:  Denies active bleeding      Physical Exam: 48 y.o. male  Wt Readings from Last 3 Encounters:   01/08/24 87.5 kg (193 lb)   12/19/23 88.4 kg (194 lb 12.8 oz)   11/07/23 85.2 kg (187 lb 14.4 oz)       There is no height or weight on file to calculate BMI.    There were no vitals filed for this visit.  Vital signs reviewed.   General Appearance:    Alert, cooperative, in no acute distress                    Ortho exam    Physical exam of the right shoulder reveals no overlying skin changes no lymphedema no lymphadenopathy.  Patient has active flexion 180 with mild symptoms abduction is similar external rotation is to 50 and internal rotation to the upper lumbar spine with mild symptoms.  Patient has good rotator cuff strength 4+ over 5 with isometric strength testing with pain.  Patient has a positive impingement and a positive Pack sign.  Patient has good cervical range of motion which is full and asymptomatic no radicular symptoms.  Patient has a normal elbow exam.  Good distal pulses are present  Patient has pain with overhead activity and a positive Neer sign and a positive empty can sign , a positive drop arm  and a definitive painful arc              Assessment: Right shoulder pain think this is impingement he did well with injection in the past he does a very labor-intensive job plan is to proceed with an injection I think is quite reasonable he will continue to work without restrictions    Plan:   Follow up as indicated.  Ice, elevate, and rest as needed.  Discussed conservative measures of pain control including ice, bracing.  Also talked about the importance of strengthening   Gardenia Norris M.D.    Large Joint Arthrocentesis: R subacromial bursa  Date/Time: 7/18/2024 4:08 PM  Consent given by: patient  Site marked: site marked  Timeout: Immediately prior to procedure a time out was called to verify the correct patient, procedure, equipment, support staff and site/side marked as required   Supporting Documentation  Indications: pain   Procedure Details  Location: shoulder - R subacromial bursa  Preparation: Patient was prepped and draped in the usual sterile fashion  Needle gauge: 21G.  Approach: posterior  Medications administered: 80 mg methylPREDNISolone acetate 80 MG/ML; 2 mL lidocaine PF 1% 1 %  Patient tolerance: patient tolerated the procedure well with no immediate complications

## 2024-09-17 ENCOUNTER — TELEPHONE (OUTPATIENT)
Dept: NEUROLOGY | Facility: CLINIC | Age: 49
End: 2024-09-17
Payer: COMMERCIAL

## 2024-10-01 ENCOUNTER — APPOINTMENT (OUTPATIENT)
Dept: GENERAL RADIOLOGY | Facility: HOSPITAL | Age: 49
End: 2024-10-01
Payer: COMMERCIAL

## 2024-10-01 ENCOUNTER — HOSPITAL ENCOUNTER (EMERGENCY)
Facility: HOSPITAL | Age: 49
Discharge: HOME | End: 2024-10-01
Attending: EMERGENCY MEDICINE
Payer: COMMERCIAL

## 2024-10-01 VITALS
OXYGEN SATURATION: 100 % | TEMPERATURE: 97.1 F | RESPIRATION RATE: 16 BRPM | HEART RATE: 60 BPM | SYSTOLIC BLOOD PRESSURE: 156 MMHG | DIASTOLIC BLOOD PRESSURE: 118 MMHG

## 2024-10-01 DIAGNOSIS — I10 HYPERTENSION NOT AT GOAL: ICD-10-CM

## 2024-10-01 DIAGNOSIS — R07.9 RIGHT-SIDED CHEST PAIN: Primary | ICD-10-CM

## 2024-10-01 LAB
ALBUMIN SERPL-MCNC: 4.4 G/DL (ref 3.5–5.2)
ALBUMIN/GLOB SERPL: 1.8 G/DL
ALP SERPL-CCNC: 63 U/L (ref 39–117)
ALT SERPL W P-5'-P-CCNC: 53 U/L (ref 1–41)
ANION GAP SERPL CALCULATED.3IONS-SCNC: 8 MMOL/L (ref 5–15)
AST SERPL-CCNC: 27 U/L (ref 1–40)
BASOPHILS # BLD AUTO: 0.03 10*3/MM3 (ref 0–0.2)
BASOPHILS NFR BLD AUTO: 0.5 % (ref 0–1.5)
BILIRUB SERPL-MCNC: 0.5 MG/DL (ref 0–1.2)
BUN SERPL-MCNC: 10 MG/DL (ref 6–20)
BUN/CREAT SERPL: 11.4 (ref 7–25)
CALCIUM SPEC-SCNC: 9.2 MG/DL (ref 8.6–10.5)
CHLORIDE SERPL-SCNC: 107 MMOL/L (ref 98–107)
CO2 SERPL-SCNC: 23 MMOL/L (ref 22–29)
CREAT SERPL-MCNC: 0.88 MG/DL (ref 0.76–1.27)
DEPRECATED RDW RBC AUTO: 46.5 FL (ref 37–54)
EGFRCR SERPLBLD CKD-EPI 2021: 106.7 ML/MIN/1.73
EOSINOPHIL # BLD AUTO: 0.11 10*3/MM3 (ref 0–0.4)
EOSINOPHIL NFR BLD AUTO: 1.8 % (ref 0.3–6.2)
ERYTHROCYTE [DISTWIDTH] IN BLOOD BY AUTOMATED COUNT: 13.2 % (ref 12.3–15.4)
GEN 5 2HR TROPONIN T REFLEX: <6 NG/L
GLOBULIN UR ELPH-MCNC: 2.5 GM/DL
GLUCOSE SERPL-MCNC: 96 MG/DL (ref 65–99)
HCT VFR BLD AUTO: 46.5 % (ref 37.5–51)
HGB BLD-MCNC: 15.8 G/DL (ref 13–17.7)
HOLD SPECIMEN: NORMAL
HOLD SPECIMEN: NORMAL
IMM GRANULOCYTES # BLD AUTO: 0.02 10*3/MM3 (ref 0–0.05)
IMM GRANULOCYTES NFR BLD AUTO: 0.3 % (ref 0–0.5)
LYMPHOCYTES # BLD AUTO: 2.36 10*3/MM3 (ref 0.7–3.1)
LYMPHOCYTES NFR BLD AUTO: 38.2 % (ref 19.6–45.3)
MAGNESIUM SERPL-MCNC: 2 MG/DL (ref 1.6–2.6)
MCH RBC QN AUTO: 32.1 PG (ref 26.6–33)
MCHC RBC AUTO-ENTMCNC: 34 G/DL (ref 31.5–35.7)
MCV RBC AUTO: 94.5 FL (ref 79–97)
MONOCYTES # BLD AUTO: 0.49 10*3/MM3 (ref 0.1–0.9)
MONOCYTES NFR BLD AUTO: 7.9 % (ref 5–12)
NEUTROPHILS NFR BLD AUTO: 3.16 10*3/MM3 (ref 1.7–7)
NEUTROPHILS NFR BLD AUTO: 51.3 % (ref 42.7–76)
NRBC BLD AUTO-RTO: 0 /100 WBC (ref 0–0.2)
PLATELET # BLD AUTO: 215 10*3/MM3 (ref 140–450)
PMV BLD AUTO: 9.7 FL (ref 6–12)
POTASSIUM SERPL-SCNC: 4 MMOL/L (ref 3.5–5.2)
PROT SERPL-MCNC: 6.9 G/DL (ref 6–8.5)
QT INTERVAL: 404 MS
QTC INTERVAL: 407 MS
RBC # BLD AUTO: 4.92 10*6/MM3 (ref 4.14–5.8)
SODIUM SERPL-SCNC: 138 MMOL/L (ref 136–145)
TROPONIN T DELTA: NORMAL
TROPONIN T SERPL HS-MCNC: <6 NG/L
TSH SERPL DL<=0.05 MIU/L-ACNC: 0.98 UIU/ML (ref 0.27–4.2)
WBC NRBC COR # BLD AUTO: 6.17 10*3/MM3 (ref 3.4–10.8)
WHOLE BLOOD HOLD COAG: NORMAL
WHOLE BLOOD HOLD SPECIMEN: NORMAL

## 2024-10-01 PROCEDURE — 93005 ELECTROCARDIOGRAM TRACING: CPT

## 2024-10-01 PROCEDURE — 93010 ELECTROCARDIOGRAM REPORT: CPT | Performed by: INTERNAL MEDICINE

## 2024-10-01 PROCEDURE — 80050 GENERAL HEALTH PANEL: CPT

## 2024-10-01 PROCEDURE — 84484 ASSAY OF TROPONIN QUANT: CPT | Performed by: EMERGENCY MEDICINE

## 2024-10-01 PROCEDURE — 36415 COLL VENOUS BLD VENIPUNCTURE: CPT

## 2024-10-01 PROCEDURE — 99284 EMERGENCY DEPT VISIT MOD MDM: CPT

## 2024-10-01 PROCEDURE — 71045 X-RAY EXAM CHEST 1 VIEW: CPT

## 2024-10-01 PROCEDURE — 83735 ASSAY OF MAGNESIUM: CPT | Performed by: PHYSICIAN ASSISTANT

## 2024-10-01 PROCEDURE — 93005 ELECTROCARDIOGRAM TRACING: CPT | Performed by: EMERGENCY MEDICINE

## 2024-10-01 PROCEDURE — 84484 ASSAY OF TROPONIN QUANT: CPT

## 2024-10-01 RX ORDER — ASPIRIN 325 MG
325 TABLET ORAL ONCE
Status: COMPLETED | OUTPATIENT
Start: 2024-10-01 | End: 2024-10-01

## 2024-10-01 RX ORDER — SODIUM CHLORIDE 0.9 % (FLUSH) 0.9 %
10 SYRINGE (ML) INJECTION AS NEEDED
Status: DISCONTINUED | OUTPATIENT
Start: 2024-10-01 | End: 2024-10-01 | Stop reason: HOSPADM

## 2024-10-01 RX ADMIN — ASPIRIN 325 MG: 325 TABLET ORAL at 12:09

## 2024-10-01 NOTE — ED PROVIDER NOTES
MD ATTESTATION NOTE    The DANY and I have discussed this patient's history, physical exam, and treatment plan.  I have reviewed the documentation and personally had a face to face interaction with the patient. I affirm the documentation and agree with the treatment and plan.  The attached note describes my personal findings.        SHARED APC FACE TO FACE: I performed a substantive part of the MDM during the patient's E/M visit. I personally evaluated and examined the patient. I personally made or approved the documented management plan and acknowledge its risk of complications.      Brief HPI: Patient presents for evaluation of right-sided chest pain.  Patient states that started on Sunday.  Patient states pain is definitely worse with movement or deep breathing.  Patient has had no leg swelling.  No hemoptysis.  Tenderness to touch.    PHYSICAL EXAM  ED Triage Vitals   Temp Heart Rate Resp BP SpO2   10/01/24 1002 10/01/24 1002 10/01/24 1002 10/01/24 1114 10/01/24 1002   97.1 °F (36.2 °C) 77 16 (!) 156/118 96 %      Temp src Heart Rate Source Patient Position BP Location FiO2 (%)   -- -- -- -- --                GENERAL: no acute distress  HENT: nares patent  EYES: no scleral icterus  CV: regular rhythm, normal rate  RESPIRATORY: normal effort  ABDOMEN: soft  MUSCULOSKELETAL: no deformity.  Tenderness to palpation right chest  NEURO: alert, moves all extremities, follows commands  PSYCH:  calm, cooperative  SKIN: warm, dry    Vital signs and nursing notes reviewed.    Chest x-ray independent interpreted by me and shows no evidence of pneumothorax    ED Course as of 10/01/24 1408   Tue Oct 01, 2024   1128 WBC: 6.17 [MP]   1128 Hemoglobin: 15.8 [MP]   1128 BUN: 10 [MP]   1128 Creatinine: 0.88 [MP]   1128 HS Troponin T: <6 [MP]   1336 Chest x-ray independently interpreted by me as no pneumothorax [MP]   1337 Reassessed the patient.  He remains chest pain-free.  He is already established with Dr. Qureshi.  Will have him  call and follow-up closely with cardiology.  He reports he is going to start taking Pepcid.  Encouraged him to follow-up with PCP and discussed ED return precautions.  He is otherwise well-appearing, hemodynamically stable, and therefore appropriate for discharge. [MP]      ED Course User Index  [MP] Shannon Malcolm PA-C         Plan: discharge       Marko Estevez MD  10/01/24 4072

## 2024-10-01 NOTE — ED PROVIDER NOTES
EMERGENCY DEPARTMENT ENCOUNTER  Room Number:  33/33  PCP: Alek Estrada MD  Independent Historians: Patient      HPI:  Chief Complaint: had concerns including Chest Pain.     A complete HPI/ROS/PMH/PSH/SH/FH are unobtainable due to: None    Chronic or social conditions impacting patient care (Social Determinants of Health): None      Context: Sadi Reyes is a 49 y.o. male with a medical history of hypertension, hyperlipidemia, GERD, CKD, migraine headaches who presents to the ED c/o acute chest pain.  Patient reports he has had intermittent palpitations since Sunday.  Last night while he was watching football, he developed a burning and pressure over the right side of his chest.  This made him feel short of air.  Reports he tried to lay down and did not sleep well throughout the night.  The pressure returned this morning when he awoke, prompting visit to the emergency department.  No recent travel, surgery, or testosterone use.  No personal or family history of coronary artery disease.  Patient is not anticoagulated.  Patient denies diaphoresis, syncope, headache, vomiting, abdominal pain, or any other systemic complaints.      Review of prior external notes (non-ED) -and- Review of prior external test results outside of this encounter:  Patient seen in office by nephrology on 9/24/2024 for CKD.  Reviewed assessment and plan.  Will check labs and have patient follow-up in 1 year.  Reviewed labs collected on 7/16/2024.  CBC with hemoglobin 14.6, CMP with creatinine 1.18.    Prescription drug monitoring program review:     N/A    PAST MEDICAL HISTORY  Active Ambulatory Problems     Diagnosis Date Noted    Acute pyelonephritis 10/28/2020    VANESSA (acute kidney injury) 10/28/2020    Leucocytosis 10/28/2020    Dehydration 10/28/2020    Nausea vomiting and diarrhea 10/28/2020    Acute abdominal pain 10/28/2020    Stage 3a chronic kidney disease 11/02/2020    Hypertension     Diastolic dysfunction without heart  failure     Acute gastritis without bleeding     Acute duodenitis     Non-cardiac chest pain     Cervicogenic headache 11/02/2020    Vasovagal syncope 06/24/2021    Encounter for screening for malignant neoplasm of colon 09/19/2022    Lumbar disc herniation with radiculopathy 02/13/2023    Superior glenoid labrum lesion of right shoulder 04/24/2023     Resolved Ambulatory Problems     Diagnosis Date Noted    No Resolved Ambulatory Problems     Past Medical History:   Diagnosis Date    Acute nephritis     Carpal tunnel syndrome     Chronic kidney disease     Cluster headache     GERD (gastroesophageal reflux disease)     Headache, tension-type     Hyperlipidemia     Migraine     Palpitations     Right shoulder pain     Spinal headache     Syncope          PAST SURGICAL HISTORY  Past Surgical History:   Procedure Laterality Date    BUNIONECTOMY Right     X3    COLONOSCOPY N/A 11/17/2022    Procedure: COLONOSCOPY TO CECUM AND TERMINAL ILEUM;  Surgeon: Tono Romero MD;  Location: [facility] ENDOSCOPY;  Service: Gastroenterology;  Laterality: N/A;  PRE- SCREENING  POST- HEMORRHOIDS    ENDOSCOPY N/A 11/02/2020    Procedure: ESOPHAGOGASTRODUODENOSCOPY with biopsies;  Surgeon: Tono Romero MD;  Location: [facility] ENDOSCOPY;  Service: Gastroenterology;  Laterality: N/A;  Pre: epigastric pain  Post: duodenitis, gastritis, hiatal hernia    EPIDURAL Right 03/30/2023    Procedure: Right S1 LUMBAR/SACRAL TRANSFORAMINAL EPIDURAL 80372;  Surgeon: Andre Wilson MD;  Location: [facility] OR;  Service: Pain Management;  Laterality: Right;    HERNIA REPAIR      SHOULDER ARTHROSCOPY W/ SUPERIOR LABRAL ANTERIOR POSTERIOR REPAIR Right 05/10/2023    Procedure: RIGHT SHOULDER ARTHROSCOPY, DEBRIDEMENT OF LABRUM AND ROATOR CUFF, AND DECOMPRESSION;  Surgeon: Gardenia Norris MD;  Location: [facility] OR [facility] [place];  Service: Orthopedics;  Laterality: Right;         FAMILY HISTORY  Family History   Problem Relation Age of Onset    Hypertension  Mother     No Known Problems Father     No Known Problems Sister     Prostate cancer Maternal Grandfather     Malyudi Hyperthermia Neg Hx          SOCIAL HISTORY  Social History     Socioeconomic History    Marital status:    Tobacco Use    Smoking status: Never    Smokeless tobacco: Never    Tobacco comments:     rare caffeine   Vaping Use    Vaping status: Never Used   Substance and Sexual Activity    Alcohol use: Yes     Comment: Twice a month    Drug use: Yes     Frequency: 1.0 times per week     Types: Marijuana     Comment: uses daily    Sexual activity: Yes     Partners: Female         ALLERGIES  Inderal la [propranolol]      REVIEW OF SYSTEMS  Review of Systems   Constitutional:  Negative for chills and fever.   HENT:  Negative for ear pain and sore throat.    Respiratory:  Negative for cough and shortness of breath.    Cardiovascular:  Positive for chest pain. Negative for palpitations.   Gastrointestinal:  Negative for abdominal pain and vomiting.   Genitourinary:  Negative for dysuria and hematuria.   Musculoskeletal:  Negative for arthralgias and joint swelling.   Skin:  Negative for pallor and rash.   Neurological:  Negative for syncope and headaches.   Psychiatric/Behavioral:  Negative for confusion and hallucinations.      Included in HPI  All systems reviewed and negative except for those discussed in HPI.      PHYSICAL EXAM    I have reviewed the triage vital signs and nursing notes.    ED Triage Vitals [10/01/24 1002]   Temp Heart Rate Resp BP SpO2   97.1 °F (36.2 °C) 77 16 -- 96 %      Temp src Heart Rate Source Patient Position BP Location FiO2 (%)   -- -- -- -- --       Physical Exam  Constitutional:       General: He is not in acute distress.     Appearance: Normal appearance.   HENT:      Head: Normocephalic and atraumatic.      Nose: Nose normal.      Mouth/Throat:      Mouth: Mucous membranes are moist.   Eyes:      Conjunctiva/sclera: Conjunctivae normal.      Pupils: Pupils are equal,  round, and reactive to light.   Cardiovascular:      Rate and Rhythm: Normal rate and regular rhythm.      Pulses: Normal pulses.      Heart sounds: Normal heart sounds.   Pulmonary:      Effort: Pulmonary effort is normal.      Breath sounds: Normal breath sounds.   Chest:      Comments: Reproducible right upper chest wall tenderness  Abdominal:      General: There is no distension.   Musculoskeletal:         General: Normal range of motion.      Cervical back: Normal range of motion and neck supple.   Skin:     General: Skin is warm.      Capillary Refill: Capillary refill takes less than 2 seconds.   Neurological:      General: No focal deficit present.      Mental Status: He is alert and oriented to person, place, and time.   Psychiatric:         Mood and Affect: Mood normal.           LAB RESULTS  Recent Results (from the past 24 hour(s))   ECG 12 Lead ED Triage Standing Order; Chest Pain    Collection Time: 10/01/24 10:06 AM   Result Value Ref Range    QT Interval 404 ms    QTC Interval 407 ms   Comprehensive Metabolic Panel    Collection Time: 10/01/24 10:14 AM    Specimen: Blood   Result Value Ref Range    Glucose 96 65 - 99 mg/dL    BUN 10 6 - 20 mg/dL    Creatinine 0.88 0.76 - 1.27 mg/dL    Sodium 138 136 - 145 mmol/L    Potassium 4.0 3.5 - 5.2 mmol/L    Chloride 107 98 - 107 mmol/L    CO2 23.0 22.0 - 29.0 mmol/L    Calcium 9.2 8.6 - 10.5 mg/dL    Total Protein 6.9 6.0 - 8.5 g/dL    Albumin 4.4 3.5 - 5.2 g/dL    ALT (SGPT) 53 (H) 1 - 41 U/L    AST (SGOT) 27 1 - 40 U/L    Alkaline Phosphatase 63 39 - 117 U/L    Total Bilirubin 0.5 0.0 - 1.2 mg/dL    Globulin 2.5 gm/dL    A/G Ratio 1.8 g/dL    BUN/Creatinine Ratio 11.4 7.0 - 25.0    Anion Gap 8.0 5.0 - 15.0 mmol/L    eGFR 106.7 >60.0 mL/min/1.73   High Sensitivity Troponin T    Collection Time: 10/01/24 10:14 AM    Specimen: Blood   Result Value Ref Range    HS Troponin T <6 <22 ng/L   Green Top (Gel)    Collection Time: 10/01/24 10:14 AM   Result Value Ref  Range    Extra Tube Hold for add-ons.    Lavender Top    Collection Time: 10/01/24 10:14 AM   Result Value Ref Range    Extra Tube hold for add-on    Gold Top - SST    Collection Time: 10/01/24 10:14 AM   Result Value Ref Range    Extra Tube Hold for add-ons.    Light Blue Top    Collection Time: 10/01/24 10:14 AM   Result Value Ref Range    Extra Tube Hold for add-ons.    CBC Auto Differential    Collection Time: 10/01/24 10:14 AM    Specimen: Blood   Result Value Ref Range    WBC 6.17 3.40 - 10.80 10*3/mm3    RBC 4.92 4.14 - 5.80 10*6/mm3    Hemoglobin 15.8 13.0 - 17.7 g/dL    Hematocrit 46.5 37.5 - 51.0 %    MCV 94.5 79.0 - 97.0 fL    MCH 32.1 26.6 - 33.0 pg    MCHC 34.0 31.5 - 35.7 g/dL    RDW 13.2 12.3 - 15.4 %    RDW-SD 46.5 37.0 - 54.0 fl    MPV 9.7 6.0 - 12.0 fL    Platelets 215 140 - 450 10*3/mm3    Neutrophil % 51.3 42.7 - 76.0 %    Lymphocyte % 38.2 19.6 - 45.3 %    Monocyte % 7.9 5.0 - 12.0 %    Eosinophil % 1.8 0.3 - 6.2 %    Basophil % 0.5 0.0 - 1.5 %    Immature Grans % 0.3 0.0 - 0.5 %    Neutrophils, Absolute 3.16 1.70 - 7.00 10*3/mm3    Lymphocytes, Absolute 2.36 0.70 - 3.10 10*3/mm3    Monocytes, Absolute 0.49 0.10 - 0.90 10*3/mm3    Eosinophils, Absolute 0.11 0.00 - 0.40 10*3/mm3    Basophils, Absolute 0.03 0.00 - 0.20 10*3/mm3    Immature Grans, Absolute 0.02 0.00 - 0.05 10*3/mm3    nRBC 0.0 0.0 - 0.2 /100 WBC   TSH    Collection Time: 10/01/24 10:14 AM    Specimen: Blood   Result Value Ref Range    TSH 0.985 0.270 - 4.200 uIU/mL   Magnesium    Collection Time: 10/01/24 10:14 AM    Specimen: Blood   Result Value Ref Range    Magnesium 2.0 1.6 - 2.6 mg/dL   High Sensitivity Troponin T 2Hr    Collection Time: 10/01/24 12:12 PM    Specimen: Blood   Result Value Ref Range    HS Troponin T <6 <22 ng/L    Troponin T Delta           RADIOLOGY  XR Chest 1 View    Result Date: 10/1/2024  XR CHEST 1 VW-  Clinical: Chest pain  COMPARISON examination 10/28/2020  FINDINGS: Normal chest  This report was  finalized on 10/1/2024 10:37 AM by Dr. Randy Wilson M.D on Workstation: [id number]         MEDICATIONS GIVEN IN ER  Medications   sodium chloride 0.9 % flush 10 mL (has no administration in time range)   aspirin tablet 325 mg (325 mg Oral Given 10/1/24 1209)         ORDERS PLACED DURING THIS VISIT:  Orders Placed This Encounter   Procedures    XR Chest 1 View    [place] Draw    Comprehensive Metabolic Panel    High Sensitivity Troponin T    CBC Auto Differential    High Sensitivity Troponin T 2Hr    TSH    Magnesium    NPO Diet NPO Type: Strict NPO    Undress & Gown    Continuous Pulse Oximetry    Oxygen Therapy- Nasal Cannula; Titrate 1-6 LPM Per SpO2; 90 - 95%    ECG 12 Lead ED Triage Standing Order; Chest Pain    ECG 12 Lead ED Triage Standing Order; Chest Pain    Insert Peripheral IV    CBC & Differential    Green Top (Gel)    Lavender Top    Gold Top - SST    Light Blue Top         OUTPATIENT MEDICATION MANAGEMENT:  Current Facility-Administered Medications Ordered in Epic   Medication Dose Route Frequency Provider Last Rate Last Admin    sodium chloride 0.9 % flush 10 mL  10 mL Intravenous PRN Marko Estevez MD         Current Outpatient Medications Ordered in Epic   Medication Sig Dispense Refill    acetaminophen (TYLENOL) 500 MG tablet Take 1 tablet by mouth Every 6 (Six) Hours As Needed for Mild Pain.      amLODIPine (NORVASC) 10 MG tablet Take 1 tablet by mouth Daily. 30 tablet 4    atorvastatin (LIPITOR) 20 MG tablet TAKE 1 TABLET BY MOUTH EVERY DAY 90 tablet 0    escitalopram (Lexapro) 10 MG tablet Take 1 tablet by mouth Daily. 90 tablet 3    esomeprazole (nexIUM) 40 MG capsule Take 1 capsule by mouth 2 (Two) Times a Day Before Meals. 60 capsule 12         PROGRESS, DATA ANALYSIS, CONSULTS, AND MEDICAL DECISION MAKING  All labs have been independently interpreted by me.  All radiology studies have been reviewed by me. All EKG's have been independently viewed and interpreted by me.  Discussion  below represents my analysis of pertinent findings related to patient's condition, differential diagnosis, treatment plan and final disposition.    Differential diagnosis includes but is not limited to costochondritis, pneumonia, ACS, GERD.    Clinical Scores: HEART Score: 2                   ED Course as of 10/01/24 1359   Tue Oct 01, 2024   1128 WBC: 6.17 [MP]   1128 Hemoglobin: 15.8 [MP]   1128 BUN: 10 [MP]   1128 Creatinine: 0.88 [MP]   1128 HS Troponin T: <6 [MP]   1336 Chest x-ray independently interpreted by me as no pneumothorax [MP]   1337 Reassessed the patient.  He remains chest pain-free.  He is already established with Dr. Qureshi.  Will have him call and follow-up closely with cardiology.  He reports he is going to start taking Pepcid.  Encouraged him to follow-up with PCP and discussed ED return precautions.  He is otherwise well-appearing, hemodynamically stable, and therefore appropriate for discharge. [MP]      ED Course User Index  [MP] Shannon Malcolm PA-C             AS OF 13:59 EDT VITALS:    BP - (!) 156/118  HR - 60  TEMP - 97.1 °F (36.2 °C)  O2 SATS - 100%    COMPLEXITY OF CARE  Admission was considered but after careful review of the patient's presentation, physical examination, diagnostic results, and response to treatment the patient may be safely discharged with outpatient follow-up.      DIAGNOSIS  Final diagnoses:   Right-sided chest pain   Hypertension not at goal         DISPOSITION  ED Disposition       ED Disposition   Discharge    Condition   Stable    Comment   --                Please note that portions of this document were completed with a voice recognition program.    Note Disclaimer: At [facility], we believe that sharing information builds trust and better relationships. You are receiving this note because you recently visited [facility]. It is possible you will see health information before a provider has talked with you about it. This kind of information can be  easy to misunderstand. To help you fully understand what it means for your health, we urge you to discuss this note with your provider.         Shannon Malcolm PA-C  10/01/24 2534

## 2024-10-01 NOTE — DISCHARGE INSTRUCTIONS
Follow-up with primary care as well as Dr. Qureshi.  Take all of your home medications as prescribed and start taking Pepcid for heartburn.  Return to emergency department for any worsening symptoms.

## 2025-01-08 NOTE — PROGRESS NOTES
Subjective:     Encounter Date:01/09/2025      Patient ID: Sadi Reyes is a 49 y.o. male.    Chief Complaint:  History of Present Illness    This is a 49-year-old with a history of hypertension, chronic kidney disease, hyperlipidemia, who presents for follow-up.     He returns today for annual follow-up.  His last office visit with Addie Lennox, APRN he was doing well and no changes were made.    Presents back today for follow-up.  He continues to feel well.  Blood pressures remain well-controlled.  He was seen by nephrology recently and his renal function was within normal limits.  They actually so they can see him as needed but the patient were to continue annual follow-up.  He denies any chest pain, shortness of breath, palpitations, orthopnea, near-syncope or syncope or lower extremity swelling.     Prior History:  I saw the patient when he was admitted to the hospital in 10/2020 after presenting with epigastric/chest pain and nausea and vomiting.  He reports that he began having symptoms of diarrhea followed by nausea and vomiting and decreased oral intake.  Following the vomiting he began developing epigastric department pressure that radiated to his chest.  His work-up in the emergency room showed evidence of acute kidney injury.  A CT of the abdomen and pelvis showed evidence of possible pyelonephritis.  His renal function improved with IV fluids.  I felt that his pain was more GI in nature rather than due to cardiac ischemia.  His troponins were negative his EKG was unchanged and an echocardiogram showed normal left ventricular systolic function wall motion with an EF of 60% and grade 3 diastolic dysfunction with no significant valvular disease.  He ended up undergoing an EGD that showed evidence of gastritis and duodenitis.  He was started on pantoprazole and Carafate with improvement in his symptoms.  His blood pressure medications were also adjusted during his stay.     In follow-up in 12/2020  he was doing better with better blood pressure control.  Plan was to consider beta-blocker therapy if he required further blood pressure management.     In 1/2021 he presented for urgent follow-up for syncope.  Since I saw the patient last he reports that he has had 2 episodes of near syncope and syncope.  First episode occurred the week before Christmas.  He reports that he was getting his haircut and had his head bent down.  He had a sudden onset of nausea and lightheadedness.  He was able to get up out of the chair and lay on the floor.  With that he was able to avoid passing out.  He recovered quickly after laying down and had no further issues that day.  The second episode occurred on New Year's Danelle when he was eating in a restaurant.  He reported that he had a sudden onset of nausea and lightheadedness again.  He went outside to sit on a bench and ended up losing consciousness.  He reported that soon as he regained consciousness he felt fine.  The only other episode he has had a passing out was about 2 to 3 years ago.  The patient reported that he was standing in line to get into a restaurant.  He admitted that he was in line for long period of time.  He reports that he had the same symptoms of nausea and lightheadedness and ended up losing consciousness.  He reported that in between he has had several episodes of similar preceding symptoms but no actual syncope.  I felt that his symptoms were due to vasovagal syncope at that time.  I educated him on vasovagal syncope and maneuvers in order to avoid syncope when he felt symptomatic.     In 5/2021 he went to [facility] emergency room last month after having a near syncopal episode.  He reports that he was at [facility] visiting his daughter who just had a baby.  He was seated when he began feeling flushed, lightheaded, nauseous, and close to passing out.  Fortunately he did not pass out.  He reports that he had eaten and drank that day without any  significant issues.  His emergency room work-up was unremarkable and he was discharged from the emergency room.        In follow-up he has been able to be weaned off of the hydralazine and metoprolol succinate and remains on amlodipine only.    Review of Systems   Constitutional: Negative for malaise/fatigue.   HENT:  Negative for hearing loss, hoarse voice, nosebleeds and sore throat.    Eyes:  Negative for pain.   Cardiovascular:  Negative for chest pain, claudication, cyanosis, dyspnea on exertion, irregular heartbeat, leg swelling, near-syncope, orthopnea, palpitations, paroxysmal nocturnal dyspnea and syncope.   Respiratory:  Negative for shortness of breath and snoring.    Endocrine: Negative for cold intolerance, heat intolerance, polydipsia, polyphagia and polyuria.   Skin:  Negative for itching and rash.   Musculoskeletal:  Negative for arthritis, falls, joint pain, joint swelling, muscle cramps, muscle weakness and myalgias.   Gastrointestinal:  Negative for constipation, diarrhea, dysphagia, heartburn, hematemesis, hematochezia, melena, nausea and vomiting.   Genitourinary:  Negative for frequency, hematuria and hesitancy.   Neurological:  Negative for excessive daytime sleepiness, dizziness, headaches, light-headedness, numbness and weakness.   Psychiatric/Behavioral:  Negative for depression. The patient is not nervous/anxious.          Current Outpatient Medications:     acetaminophen (TYLENOL) 500 MG tablet, Take 1 tablet by mouth Every 6 (Six) Hours As Needed for Mild Pain., Disp: , Rfl:     amLODIPine (NORVASC) 10 MG tablet, Take 1 tablet by mouth Daily., Disp: 30 tablet, Rfl: 4    atorvastatin (LIPITOR) 20 MG tablet, TAKE 1 TABLET BY MOUTH EVERY DAY, Disp: 90 tablet, Rfl: 0    esomeprazole (nexIUM) 40 MG capsule, Take 1 capsule by mouth 2 (Two) Times a Day Before Meals., Disp: 60 capsule, Rfl: 12    vitamin D (ERGOCALCIFEROL) 1.25 MG (44082 UT) capsule capsule, Take 1 capsule by mouth 1 (One) Time  Per Week., Disp: , Rfl:     escitalopram (Lexapro) 10 MG tablet, Take 1 tablet by mouth Daily., Disp: 90 tablet, Rfl: 3    Past Medical History:   Diagnosis Date    Acute nephritis     Acute pyelonephritis     VANESSA (acute kidney injury)     Carpal tunnel syndrome     Chronic kidney disease     STAGE 3    Cluster headache     GERD (gastroesophageal reflux disease)     Headache, tension-type     Hyperlipidemia     Hypertension     Migraine     Nausea vomiting and diarrhea 10/28/2020    Palpitations     Right shoulder pain     Spinal headache     Syncope        Past Surgical History:   Procedure Laterality Date    BUNIONECTOMY Right     X3    COLONOSCOPY N/A 11/17/2022    Procedure: COLONOSCOPY TO CECUM AND TERMINAL ILEUM;  Surgeon: Tono Romero MD;  Location: [facility] ENDOSCOPY;  Service: Gastroenterology;  Laterality: N/A;  PRE- SCREENING  POST- HEMORRHOIDS    ENDOSCOPY N/A 11/02/2020    Procedure: ESOPHAGOGASTRODUODENOSCOPY with biopsies;  Surgeon: Tono Romero MD;  Location: [facility] ENDOSCOPY;  Service: Gastroenterology;  Laterality: N/A;  Pre: epigastric pain  Post: duodenitis, gastritis, hiatal hernia    EPIDURAL Right 03/30/2023    Procedure: Right S1 LUMBAR/SACRAL TRANSFORAMINAL EPIDURAL 44106;  Surgeon: Andre Wilson MD;  Location: [facility] [place] MAIN OR;  Service: Pain Management;  Laterality: Right;    HERNIA REPAIR      SHOULDER ARTHROSCOPY W/ SUPERIOR LABRAL ANTERIOR POSTERIOR REPAIR Right 05/10/2023    Procedure: RIGHT SHOULDER ARTHROSCOPY, DEBRIDEMENT OF LABRUM AND ROATOR CUFF, AND DECOMPRESSION;  Surgeon: Gardenia Norris MD;  Location: [facility] OR [facility] [place];  Service: Orthopedics;  Laterality: Right;       Family History   Problem Relation Age of Onset    Hypertension Mother     No Known Problems Father     No Known Problems Sister     Prostate cancer Maternal Grandfather     Malig Hyperthermia Neg Hx        Social History     Tobacco Use    Smoking status: Never    Smokeless tobacco: Never    Tobacco  "comments:     rare caffeine   Vaping Use    Vaping status: Never Used   Substance Use Topics    Alcohol use: Yes     Comment: Twice a month    Drug use: Yes     Frequency: 1.0 times per week     Types: Marijuana     Comment: uses daily         ECG 12 Lead    Date/Time: 1/9/2025 12:26 PM  Performed by: Marta Qureshi MD    Authorized by: Marta Qureshi MD  Comparison: compared with previous ECG   Similar to previous ECG  Rhythm: sinus rhythm             Objective:     Visit Vitals  Pulse 74   Ht 180.3 cm (71\")   Wt 83.6 kg (184 lb 3.2 oz)   SpO2 98%   BMI 25.69 kg/m²         Constitutional:       Appearance: Normal appearance. Well-developed.   HENT:      Head: Normocephalic and atraumatic.   Neck:      Vascular: No carotid bruit or JVD.   Pulmonary:      Effort: Pulmonary effort is normal.      Breath sounds: Normal breath sounds.   Cardiovascular:      Normal rate. Regular rhythm.      No gallop.    Pulses:     Radial: 2+ bilaterally.  Edema:     Peripheral edema absent.   Abdominal:      Palpations: Abdomen is soft.   Skin:     General: Skin is warm and dry.   Neurological:      Mental Status: Alert and oriented to person, place, and time.           Assessment:          Diagnosis Plan   1. Primary hypertension        2. Vasovagal syncope        3. Stage 3a chronic kidney disease               Plan:         1.  Hypertension.  Well-controlled on current regimen medications.  Continue the same.  2.  Chronic kidney disease.  Actually appears normalized.  He is followed by nephrology.    Will plan on seeing the patient back again in 1 year or sooner if further issues arise.       "

## 2025-01-09 ENCOUNTER — OFFICE VISIT (OUTPATIENT)
Age: 50
End: 2025-01-09
Payer: COMMERCIAL

## 2025-01-09 VITALS
WEIGHT: 184.2 LBS | HEART RATE: 74 BPM | OXYGEN SATURATION: 98 % | SYSTOLIC BLOOD PRESSURE: 122 MMHG | HEIGHT: 71 IN | DIASTOLIC BLOOD PRESSURE: 70 MMHG | BODY MASS INDEX: 25.79 KG/M2

## 2025-01-09 DIAGNOSIS — I10 PRIMARY HYPERTENSION: Primary | Chronic | ICD-10-CM

## 2025-01-09 DIAGNOSIS — N18.31 STAGE 3A CHRONIC KIDNEY DISEASE: Chronic | ICD-10-CM

## 2025-01-09 DIAGNOSIS — R55 VASOVAGAL SYNCOPE: ICD-10-CM

## 2025-01-09 RX ORDER — ERGOCALCIFEROL 1.25 MG/1
1 CAPSULE, LIQUID FILLED ORAL WEEKLY
COMMUNITY
Start: 2024-12-30

## 2025-01-23 ENCOUNTER — OFFICE VISIT (OUTPATIENT)
Dept: NEUROLOGY | Facility: CLINIC | Age: 50
End: 2025-01-23
Payer: COMMERCIAL

## 2025-01-23 VITALS
WEIGHT: 187 LBS | HEIGHT: 71 IN | OXYGEN SATURATION: 98 % | HEART RATE: 64 BPM | DIASTOLIC BLOOD PRESSURE: 78 MMHG | SYSTOLIC BLOOD PRESSURE: 124 MMHG | BODY MASS INDEX: 26.18 KG/M2

## 2025-01-23 DIAGNOSIS — G44.86 CERVICOGENIC HEADACHE: Chronic | ICD-10-CM

## 2025-01-23 DIAGNOSIS — G44.229 CHRONIC TENSION-TYPE HEADACHE, NOT INTRACTABLE: ICD-10-CM

## 2025-01-23 DIAGNOSIS — M54.2 NECK PAIN: Primary | ICD-10-CM

## 2025-01-23 DIAGNOSIS — M54.81 BILATERAL OCCIPITAL NEURALGIA: ICD-10-CM

## 2025-01-23 PROCEDURE — 99214 OFFICE O/P EST MOD 30 MIN: CPT | Performed by: NURSE PRACTITIONER

## 2025-01-23 RX ORDER — ESCITALOPRAM OXALATE 10 MG/1
10 TABLET ORAL DAILY
Qty: 90 TABLET | Refills: 3 | Status: SHIPPED | OUTPATIENT
Start: 2025-01-23 | End: 2026-01-23

## 2025-01-23 NOTE — PROGRESS NOTES
Subjective   Sadi Reyes is a 49 y.o. male presenting for follow-up.  He has a history of tension headache, bilateral occipital neuralgia, and vasovagal syncope.  He is taking Lexapro 10 mg daily.  He says this helps quite a bit for his headaches.  His occipital neuralgia is infrequent and is not bothering him at this time.  We have discussed the option of an occipital nerve block in the past if needed.  He does report some neck pain that has been ongoing.  This began after an accident at work in 2022.  He injured his right rotator cuff requiring surgery.  He has not had any recent neck imaging.    I saw the patient initially for episodes of syncope.  I felt they were likely vasovagal and he has seen cardiology and they agreed with this.  He does get fairly obvious warning signs of presyncope prior to these episodes.  Since he is aware of this he has been able to stop these from occurring by either sitting or lying down when he feels this way.  He denies any further episodes of syncope since his last visit.    History of Present Illness     Review of Systems   Neurological:  Positive for headache. Negative for dizziness, tremors, seizures, syncope, facial asymmetry, speech difficulty, weakness, light-headedness, numbness, memory problem and confusion.     I have reviewed and confirmed the accuracy of the patient's history: Chief complaint, HPI, ROS, Subjective, and Past Family Social History as entered by the MA/KATHLEEN/RN. Phoebe Wild MA 01/23/25      Objective     Physical Examination:  General Appearance:  Well developed, well nourished, well groomed, alert, and cooperative.  HEENT: Normocephalic.    Neck and Spine: Normal range of motion.  Normal alignment. No mass or tenderness. No bruits.  Cardiac: Regular rate and rhythm. No murmurs.  Peripheral Vasculature: Radial and pedal pulses are equal and symmetric.   Extremities: No edema or deformities. Normal joint ROM.  Skin: No rashes or birth  marks.      Neurological examination:   Higher Integrative Function: Oriented to time, place, and person. Normal registration, recall attention span and concentration. Normal language including comprehension, spontaneous speech, repetition, reading, writing, naming and vocabulary. No neglect with normal visual-spatial function and construction. Normal fund of knowledge and higher integrative function.   CN II: Pupils are equal, round and reactive to light. Normal visual acuity and visual fields.   CN III IV VI: Extraocular movements are full without nystagmus.   CN V: Normal facial sensation and strength of muscles of mastication.   CN VII: Facial movements are symmetric. No weakness.   CN VIII: Auditory acuity is normal.  CN IX & X: Symmetric palatal movement.   CN XI: Sternocleidomastoid and trapezius are normal. No weakness.   CN XII: The tongue is midline. No atrophy or fasciculations.  Motor: Normal muscle strength, bulk and tone in upper and lower extremities. No fasciculations, rigidity, spasticity, or abnormal movements.   Reflexes: 2+ in the upper and lower extremities. Plantar responses are flexor.   Sensation: Normal light touch, pinprick, vibration, temperature, and proprioception in the arms and legs.   Station and Gait: Normal gait and station.   Coordination: Finger to nose test shows no dysmetria. Rapid alternating movements are normal. Heel to shin is normal.           Assessment & Plan   Diagnoses and all orders for this visit:    1. Neck pain (Primary)  -     MRI Cervical Spine With & Without Contrast; Future    2. Bilateral occipital neuralgia  -     MRI Cervical Spine With & Without Contrast; Future    3. Cervicogenic headache    4. Chronic tension-type headache, not intractable    Other orders  -     escitalopram (Lexapro) 10 MG tablet; Take 1 tablet by mouth Daily.  Dispense: 90 tablet; Refill: 3    He feels the Lexapro is working very well for him and would like to continue this.  He does  have some worsening neck pain.  I ordered an MRI cervical spine to further evaluate this.  We will contact him with these results.  Should his occipital neuralgia flare we can get him in for occipital nerve block.    Follow-up annually.    I spent 30 minutes caring for patient on todays date of service. This time includes time spent by me in the following activities: obtaining and/or reviewing a separately obtained history, performing a medically appropriate examination and/or evaluation, counseling and educating the patient on diagnosis and treatment, ordering medications, tests, or procedures, referring and communicating with other health care professionals and documenting information in the medical record.

## 2025-03-18 DIAGNOSIS — M54.81 BILATERAL OCCIPITAL NEURALGIA: ICD-10-CM

## 2025-03-18 DIAGNOSIS — M54.2 NECK PAIN: Primary | ICD-10-CM

## 2025-04-01 ENCOUNTER — HOSPITAL ENCOUNTER (OUTPATIENT)
Dept: CT IMAGING | Facility: HOSPITAL | Age: 50
Discharge: HOME | End: 2025-04-01
Admitting: NURSE PRACTITIONER
Payer: COMMERCIAL

## 2025-04-01 DIAGNOSIS — M54.2 NECK PAIN: ICD-10-CM

## 2025-04-01 DIAGNOSIS — M54.81 BILATERAL OCCIPITAL NEURALGIA: ICD-10-CM

## 2025-04-01 PROCEDURE — 72125 CT NECK SPINE W/O DYE: CPT

## (undated) DEVICE — TBG ARTHSCP PT W CONN/REDUC 8FT

## (undated) DEVICE — TUBING, SUCTION, 1/4" X 10', STRAIGHT: Brand: MEDLINE

## (undated) DEVICE — ABL ASP APOLLO RF XL 90D

## (undated) DEVICE — ADAPT CLN BIOGUARD AIR/H2O DISP

## (undated) DEVICE — PK ARTHSCP SHLDR TOWER 40

## (undated) DEVICE — SENSR O2 OXIMAX FNGR A/ 18IN NONSTR

## (undated) DEVICE — FRCP BX RADJAW4 NDL 2.8 240CM LG OG BX40

## (undated) DEVICE — GLV SURG BIOGEL LTX PF 6 1/2

## (undated) DEVICE — KT ORCA ORCAPOD DISP STRL

## (undated) DEVICE — KT POSTN ARM TRIMANO BEACH CHR W/DRP

## (undated) DEVICE — BLD SHAVER BONECUTTER 5MM 13CM

## (undated) DEVICE — EPIDURAL TRAY: Brand: MEDLINE INDUSTRIES, INC.

## (undated) DEVICE — CANN O2 ETCO2 FITS ALL CONN CO2 SMPL A/ 7IN DISP LF

## (undated) DEVICE — LN SMPL CO2 SHTRM SD STREAM W/M LUER

## (undated) DEVICE — Device: Brand: PORTEX

## (undated) DEVICE — DRSNG WND GZ CURAD OIL EMULSION 3X3IN STRL

## (undated) DEVICE — BITEBLOCK OMNI BLOC

## (undated) DEVICE — NDL SPINE 22G 31/2IN BLK

## (undated) DEVICE — SKIN PREP TRAY W/CHG: Brand: MEDLINE INDUSTRIES, INC.

## (undated) DEVICE — GLV SURG TRIUMPH PF LTX 7.5 STRL

## (undated) DEVICE — ARM SLING: Brand: DEROYAL

## (undated) DEVICE — BLANKT WARM LOWR/BDY 100X120CM

## (undated) DEVICE — DRAPE,SHOULDER,BEACH ULTRAGARD: Brand: MEDLINE